# Patient Record
Sex: FEMALE | Race: WHITE | NOT HISPANIC OR LATINO | URBAN - METROPOLITAN AREA
[De-identification: names, ages, dates, MRNs, and addresses within clinical notes are randomized per-mention and may not be internally consistent; named-entity substitution may affect disease eponyms.]

---

## 2017-04-03 ENCOUNTER — EMERGENCY (EMERGENCY)
Facility: HOSPITAL | Age: 82
LOS: 1 days | Discharge: LEFT BEFORE DISCHARGE | End: 2017-04-03
Attending: EMERGENCY MEDICINE | Admitting: EMERGENCY MEDICINE
Payer: MEDICARE

## 2017-04-03 VITALS
OXYGEN SATURATION: 98 % | DIASTOLIC BLOOD PRESSURE: 58 MMHG | RESPIRATION RATE: 16 BRPM | TEMPERATURE: 98 F | SYSTOLIC BLOOD PRESSURE: 155 MMHG | HEART RATE: 62 BPM

## 2017-04-03 VITALS
OXYGEN SATURATION: 98 % | RESPIRATION RATE: 17 BRPM | SYSTOLIC BLOOD PRESSURE: 136 MMHG | TEMPERATURE: 98 F | HEART RATE: 73 BPM | DIASTOLIC BLOOD PRESSURE: 78 MMHG

## 2017-04-03 DIAGNOSIS — Z98.89 OTHER SPECIFIED POSTPROCEDURAL STATES: Chronic | ICD-10-CM

## 2017-04-03 DIAGNOSIS — Z96.60 PRESENCE OF UNSPECIFIED ORTHOPEDIC JOINT IMPLANT: Chronic | ICD-10-CM

## 2017-04-03 LAB
ALBUMIN SERPL ELPH-MCNC: 4.1 G/DL — SIGNIFICANT CHANGE UP (ref 3.3–5)
ALP SERPL-CCNC: 87 U/L — SIGNIFICANT CHANGE UP (ref 40–120)
ALT FLD-CCNC: 11 U/L RC — SIGNIFICANT CHANGE UP (ref 10–45)
ANION GAP SERPL CALC-SCNC: 15 MMOL/L — SIGNIFICANT CHANGE UP (ref 5–17)
APPEARANCE UR: CLEAR — SIGNIFICANT CHANGE UP
AST SERPL-CCNC: 11 U/L — SIGNIFICANT CHANGE UP (ref 10–40)
BASOPHILS # BLD AUTO: 0 K/UL — SIGNIFICANT CHANGE UP (ref 0–0.2)
BASOPHILS NFR BLD AUTO: 0.1 % — SIGNIFICANT CHANGE UP (ref 0–2)
BILIRUB SERPL-MCNC: 0.7 MG/DL — SIGNIFICANT CHANGE UP (ref 0.2–1.2)
BILIRUB UR-MCNC: NEGATIVE — SIGNIFICANT CHANGE UP
BUN SERPL-MCNC: 18 MG/DL — SIGNIFICANT CHANGE UP (ref 7–23)
CALCIUM SERPL-MCNC: 9.9 MG/DL — SIGNIFICANT CHANGE UP (ref 8.4–10.5)
CHLORIDE SERPL-SCNC: 104 MMOL/L — SIGNIFICANT CHANGE UP (ref 96–108)
CK MB BLD-MCNC: 1.5 % — SIGNIFICANT CHANGE UP (ref 0–3.5)
CK MB CFR SERPL CALC: 1.7 NG/ML — SIGNIFICANT CHANGE UP (ref 0–3.8)
CK SERPL-CCNC: 117 U/L — SIGNIFICANT CHANGE UP (ref 25–170)
CO2 SERPL-SCNC: 21 MMOL/L — LOW (ref 22–31)
COLOR SPEC: YELLOW — SIGNIFICANT CHANGE UP
CREAT SERPL-MCNC: 1.18 MG/DL — SIGNIFICANT CHANGE UP (ref 0.5–1.3)
DIFF PNL FLD: NEGATIVE — SIGNIFICANT CHANGE UP
EOSINOPHIL # BLD AUTO: 0 K/UL — SIGNIFICANT CHANGE UP (ref 0–0.5)
EOSINOPHIL NFR BLD AUTO: 0.1 % — SIGNIFICANT CHANGE UP (ref 0–6)
EPI CELLS # UR: SIGNIFICANT CHANGE UP /HPF
GLUCOSE SERPL-MCNC: 158 MG/DL — HIGH (ref 70–99)
GLUCOSE UR QL: NEGATIVE — SIGNIFICANT CHANGE UP
HCT VFR BLD CALC: 42 % — SIGNIFICANT CHANGE UP (ref 34.5–45)
HGB BLD-MCNC: 13.9 G/DL — SIGNIFICANT CHANGE UP (ref 11.5–15.5)
KETONES UR-MCNC: ABNORMAL
LEUKOCYTE ESTERASE UR-ACNC: NEGATIVE — SIGNIFICANT CHANGE UP
LYMPHOCYTES # BLD AUTO: 1.2 K/UL — SIGNIFICANT CHANGE UP (ref 1–3.3)
LYMPHOCYTES # BLD AUTO: 18.2 % — SIGNIFICANT CHANGE UP (ref 13–44)
MCHC RBC-ENTMCNC: 30.2 PG — SIGNIFICANT CHANGE UP (ref 27–34)
MCHC RBC-ENTMCNC: 33.2 GM/DL — SIGNIFICANT CHANGE UP (ref 32–36)
MCV RBC AUTO: 90.9 FL — SIGNIFICANT CHANGE UP (ref 80–100)
MONOCYTES # BLD AUTO: 0.6 K/UL — SIGNIFICANT CHANGE UP (ref 0–0.9)
MONOCYTES NFR BLD AUTO: 8.4 % — SIGNIFICANT CHANGE UP (ref 2–14)
NEUTROPHILS # BLD AUTO: 4.8 K/UL — SIGNIFICANT CHANGE UP (ref 1.8–7.4)
NEUTROPHILS NFR BLD AUTO: 73.2 % — SIGNIFICANT CHANGE UP (ref 43–77)
NITRITE UR-MCNC: NEGATIVE — SIGNIFICANT CHANGE UP
PH UR: 7.5 — SIGNIFICANT CHANGE UP (ref 4.8–8)
PLATELET # BLD AUTO: 168 K/UL — SIGNIFICANT CHANGE UP (ref 150–400)
POTASSIUM SERPL-MCNC: 3.9 MMOL/L — SIGNIFICANT CHANGE UP (ref 3.5–5.3)
POTASSIUM SERPL-SCNC: 3.9 MMOL/L — SIGNIFICANT CHANGE UP (ref 3.5–5.3)
PROT SERPL-MCNC: 7.3 G/DL — SIGNIFICANT CHANGE UP (ref 6–8.3)
PROT UR-MCNC: SIGNIFICANT CHANGE UP
RBC # BLD: 4.62 M/UL — SIGNIFICANT CHANGE UP (ref 3.8–5.2)
RBC # FLD: 13.3 % — SIGNIFICANT CHANGE UP (ref 10.3–14.5)
RBC CASTS # UR COMP ASSIST: SIGNIFICANT CHANGE UP /HPF (ref 0–2)
SODIUM SERPL-SCNC: 140 MMOL/L — SIGNIFICANT CHANGE UP (ref 135–145)
SP GR SPEC: 1.01 — SIGNIFICANT CHANGE UP (ref 1.01–1.02)
TROPONIN T SERPL-MCNC: <0.01 NG/ML — SIGNIFICANT CHANGE UP (ref 0–0.06)
UROBILINOGEN FLD QL: NEGATIVE — SIGNIFICANT CHANGE UP
WBC # BLD: 6.6 K/UL — SIGNIFICANT CHANGE UP (ref 3.8–10.5)
WBC # FLD AUTO: 6.6 K/UL — SIGNIFICANT CHANGE UP (ref 3.8–10.5)
WBC UR QL: SIGNIFICANT CHANGE UP /HPF (ref 0–5)

## 2017-04-03 PROCEDURE — 70450 CT HEAD/BRAIN W/O DYE: CPT

## 2017-04-03 PROCEDURE — 93010 ELECTROCARDIOGRAM REPORT: CPT

## 2017-04-03 PROCEDURE — 81001 URINALYSIS AUTO W/SCOPE: CPT

## 2017-04-03 PROCEDURE — 99284 EMERGENCY DEPT VISIT MOD MDM: CPT | Mod: 25

## 2017-04-03 PROCEDURE — 72170 X-RAY EXAM OF PELVIS: CPT

## 2017-04-03 PROCEDURE — 99285 EMERGENCY DEPT VISIT HI MDM: CPT | Mod: 25

## 2017-04-03 PROCEDURE — 73502 X-RAY EXAM HIP UNI 2-3 VIEWS: CPT | Mod: 26,RT

## 2017-04-03 PROCEDURE — 71010: CPT | Mod: 26

## 2017-04-03 PROCEDURE — 71045 X-RAY EXAM CHEST 1 VIEW: CPT

## 2017-04-03 PROCEDURE — 84484 ASSAY OF TROPONIN QUANT: CPT

## 2017-04-03 PROCEDURE — 70450 CT HEAD/BRAIN W/O DYE: CPT | Mod: 26

## 2017-04-03 PROCEDURE — 73562 X-RAY EXAM OF KNEE 3: CPT | Mod: 26,RT

## 2017-04-03 PROCEDURE — 93005 ELECTROCARDIOGRAM TRACING: CPT

## 2017-04-03 PROCEDURE — 73562 X-RAY EXAM OF KNEE 3: CPT

## 2017-04-03 PROCEDURE — 82550 ASSAY OF CK (CPK): CPT

## 2017-04-03 PROCEDURE — 73502 X-RAY EXAM HIP UNI 2-3 VIEWS: CPT

## 2017-04-03 PROCEDURE — 85027 COMPLETE CBC AUTOMATED: CPT

## 2017-04-03 PROCEDURE — 80053 COMPREHEN METABOLIC PANEL: CPT

## 2017-04-03 PROCEDURE — 87086 URINE CULTURE/COLONY COUNT: CPT

## 2017-04-03 PROCEDURE — 82553 CREATINE MB FRACTION: CPT

## 2017-04-03 RX ORDER — SODIUM CHLORIDE 9 MG/ML
1000 INJECTION INTRAMUSCULAR; INTRAVENOUS; SUBCUTANEOUS ONCE
Qty: 0 | Refills: 0 | Status: COMPLETED | OUTPATIENT
Start: 2017-04-03 | End: 2017-04-03

## 2017-04-03 RX ORDER — ACETAMINOPHEN 500 MG
650 TABLET ORAL ONCE
Qty: 0 | Refills: 0 | Status: COMPLETED | OUTPATIENT
Start: 2017-04-03 | End: 2017-04-03

## 2017-04-03 RX ADMIN — Medication 650 MILLIGRAM(S): at 12:07

## 2017-04-03 RX ADMIN — SODIUM CHLORIDE 1000 MILLILITER(S): 9 INJECTION INTRAMUSCULAR; INTRAVENOUS; SUBCUTANEOUS at 11:19

## 2017-04-03 RX ADMIN — Medication 650 MILLIGRAM(S): at 11:19

## 2017-04-03 RX ADMIN — SODIUM CHLORIDE 2000 MILLILITER(S): 9 INJECTION INTRAMUSCULAR; INTRAVENOUS; SUBCUTANEOUS at 15:09

## 2017-04-03 NOTE — ED PROVIDER NOTE - FAMILY HISTORY
Mother  Still living? No  Family history of dementia, Age at diagnosis: Age Unknown     Father  Still living? No  Family history of secondary cancer of bone and bone marrow, Age at diagnosis: Age Unknown     Sibling  Still living? Yes, Estimated age: Age Unknown  Family history of lung cancer, Age at diagnosis: Age Unknown

## 2017-04-03 NOTE — ED PROVIDER NOTE - PHYSICAL EXAMINATION
Gen: NAD, AOx3  Head: NCAT  HEENT: PERRL, oral mucosa moist, normal conjunctiva  Lung: CTAB, no respiratory distress  CV: rrr, no murmurs, Normal perfusion  Abd: soft, NTND, no CVA tenderness  MSK: No edema, no visible deformities, R knee no bruising minimal swelling tenderness distal to patella, R hip with decreased ROM due to pain no bruising no external rotation or shortening, pelvis stable nontender; Entire spine without midline tenderness and with full ROM, no paravertebral tenderness, no stepoffs or masses   Neuro: No focal neurologic deficits, CN intact, motor and sensation intact, no cerebellar signs   Skin: No rash   Psych: normal affect

## 2017-04-03 NOTE — ED ADULT NURSE NOTE - OBJECTIVE STATEMENT
80 yo female with PMH L lung ca s/p lobectomy, DM, a fib, s/p colectomy brought by EMS s/p episode of altered mental status today. Patient was in shower, daughter was cleaning room outside of bathroom. After shower patient sat on toilet because she was feeling weak, daughter states that patient became "unresponsive" for 2 minutes. (Patient had her eyes open and did not appear to lose consciousness, but was not responding to questions or speaking). Daughter also states that yesterday she found patient kneeling on bathroom floor, patient had defecated on floor which was unusual for her. patient denies falling but does reporting mild right knee pain. At present, patient reports dizziness. She is A&Ox3. Respirations unlabored. Abdomen is soft, nondistended. Moving all extremities. Pt states pain in right knee from fall, symmetry noted bilaterally, +pp bilaterally, +sensation bilaterally. Pt denies N/V, pt denies numbness and tingling, no fever, no cough, no chest pain, no SOB. IV in left forearm 20G and patent, VSS, pt currently resting in bed, will continue to monitor

## 2017-04-03 NOTE — ED PROVIDER NOTE - PROGRESS NOTE DETAILS
Discussed results of tests and xrays with patient, recommend admission for further workup, however patient would like to go home anyway. The patient is leaving against my medical advice.  I have informed the patient about the risks, benefits, and alternatives to the evaluation and treatment of their condition.  The patient reports understanding of these issues and has the capacity and insight to make important medical decisions. The necessity to follow up with PMD/Clinic/follow up provided within 2-3 days was explained. The patient understands that this decision to go against medical advice can be changed at any time and the patient can return for re-evaluation and further treatment with any changes in condition or concerns. The patient understands all the reasons to return to the Emergency Department, including any concerns at all, the patient reports understanding of above with capacity and insight.

## 2017-04-03 NOTE — ED PROVIDER NOTE - PSH
S/P colectomy    S/P lobectomy of lung  s/p LLL Lobectomy 6/2014 for lung CA  Status post left hip replacement

## 2017-04-03 NOTE — ED PROVIDER NOTE - MEDICAL DECISION MAKING DETAILS
82 yo female with multiple episode of dizziness/lightheadedness including julia syncope x 2 minutes this AM in the setting of intermittent diarrhea.  No fevers, no abdominal pain, no vomiting.  Suspect volume depletion; doubt c.diff.  Benign abdomen.  Will check labs, IVF, likely admit for further monitoring/IVF/tele.

## 2017-04-03 NOTE — ED PROVIDER NOTE - OBJECTIVE STATEMENT
Patient is 81 y F with PMH L lung ca s/p lobectomy, DM, a fib, s/p colectomy. Waqs showering this AM and felt lightheaded, and sat down on toilet and became lethargic and couldn't get up, daughter called EMS. No fall, no head trauma. Has had decreased PO intake. Currently feels lightheaded, without pain. Fell yesterday without head trauma or LOC.    PMD:  ROS: Denies fever, palpitations, chills, recent sickness, HA, vision changes, cough, SOB, chest pain, abdominal pain, n/v/d/c, dysuria, hematuria, rash, new joint aches, sick contacts, and recent travel. Patient is 81 y F with PMH L lung ca s/p lobectomy, DM, a fib, s/p colectomy. Was showering this AM and felt lightheaded, and sat down on toilet and became lethargic and couldn't get up, daughter called EMS. No fall, no head trauma. Has had decreased PO intake. Currently feels lightheaded, without pain. Fell yesterday without head trauma or LOC after feeling weak.    ROS: Denies fever, palpitations, chills, recent sickness, HA, vision changes, cough, SOB, chest pain, abdominal pain, n/v/d/c, dysuria, hematuria, rash, new joint aches, sick contacts, and recent travel. Patient is 81 y F with PMH L lung ca s/p L lobectomy, DM, a fib, s/p colectomy. Was showering this AM and felt lightheaded, and sat down on toilet and became lethargic and couldn't get up, daughter called EMS. No fall, no head trauma. Has had decreased PO intake past few days. Currently feels lightheaded, without pain. Fell yesterday without head trauma or LOC after feeling weak.    ROS: Denies fever, palpitations, chills, recent sickness, HA, vision changes, cough, SOB, chest pain, abdominal pain, n/v/d/c, dysuria, hematuria, rash, new joint aches, sick contacts, and recent travel.

## 2017-04-04 LAB
CULTURE RESULTS: SIGNIFICANT CHANGE UP
SPECIMEN SOURCE: SIGNIFICANT CHANGE UP

## 2017-04-07 DIAGNOSIS — R53.83 OTHER FATIGUE: ICD-10-CM

## 2017-04-07 DIAGNOSIS — Z79.01 LONG TERM (CURRENT) USE OF ANTICOAGULANTS: ICD-10-CM

## 2017-04-07 DIAGNOSIS — R19.7 DIARRHEA, UNSPECIFIED: ICD-10-CM

## 2017-04-07 DIAGNOSIS — I48.91 UNSPECIFIED ATRIAL FIBRILLATION: ICD-10-CM

## 2017-04-07 DIAGNOSIS — E11.9 TYPE 2 DIABETES MELLITUS WITHOUT COMPLICATIONS: ICD-10-CM

## 2017-04-07 DIAGNOSIS — R42 DIZZINESS AND GIDDINESS: ICD-10-CM

## 2017-06-01 ENCOUNTER — APPOINTMENT (OUTPATIENT)
Dept: HEART AND VASCULAR | Facility: CLINIC | Age: 82
End: 2017-06-01

## 2017-06-02 ENCOUNTER — APPOINTMENT (OUTPATIENT)
Dept: CARDIOLOGY | Facility: CLINIC | Age: 82
End: 2017-06-02

## 2017-06-02 VITALS
SYSTOLIC BLOOD PRESSURE: 137 MMHG | HEIGHT: 64 IN | DIASTOLIC BLOOD PRESSURE: 72 MMHG | HEART RATE: 68 BPM | OXYGEN SATURATION: 95 %

## 2018-01-19 ENCOUNTER — INPATIENT (INPATIENT)
Facility: HOSPITAL | Age: 83
LOS: 5 days | Discharge: HOME | DRG: 300 | End: 2018-01-25
Attending: HOSPITALIST | Admitting: HOSPITALIST
Payer: MEDICARE

## 2018-01-19 ENCOUNTER — APPOINTMENT (OUTPATIENT)
Dept: CARDIOLOGY | Facility: CLINIC | Age: 83
End: 2018-01-19
Payer: MEDICARE

## 2018-01-19 ENCOUNTER — NON-APPOINTMENT (OUTPATIENT)
Age: 83
End: 2018-01-19

## 2018-01-19 ENCOUNTER — OUTPATIENT (OUTPATIENT)
Dept: OUTPATIENT SERVICES | Facility: HOSPITAL | Age: 83
LOS: 1 days | End: 2018-01-19
Payer: MEDICARE

## 2018-01-19 VITALS
DIASTOLIC BLOOD PRESSURE: 73 MMHG | BODY MASS INDEX: 28.51 KG/M2 | WEIGHT: 167 LBS | OXYGEN SATURATION: 92 % | HEART RATE: 75 BPM | SYSTOLIC BLOOD PRESSURE: 135 MMHG | HEIGHT: 64 IN

## 2018-01-19 VITALS
TEMPERATURE: 98 F | WEIGHT: 166.89 LBS | OXYGEN SATURATION: 97 % | SYSTOLIC BLOOD PRESSURE: 152 MMHG | DIASTOLIC BLOOD PRESSURE: 66 MMHG | HEART RATE: 65 BPM | RESPIRATION RATE: 20 BRPM

## 2018-01-19 DIAGNOSIS — Z96.60 PRESENCE OF UNSPECIFIED ORTHOPEDIC JOINT IMPLANT: Chronic | ICD-10-CM

## 2018-01-19 DIAGNOSIS — Z98.89 OTHER SPECIFIED POSTPROCEDURAL STATES: Chronic | ICD-10-CM

## 2018-01-19 DIAGNOSIS — C34.90 MALIGNANT NEOPLASM OF UNSPECIFIED PART OF UNSPECIFIED BRONCHUS OR LUNG: ICD-10-CM

## 2018-01-19 DIAGNOSIS — E11.9 TYPE 2 DIABETES MELLITUS WITHOUT COMPLICATIONS: ICD-10-CM

## 2018-01-19 DIAGNOSIS — Z29.9 ENCOUNTER FOR PROPHYLACTIC MEASURES, UNSPECIFIED: ICD-10-CM

## 2018-01-19 DIAGNOSIS — I82.409 ACUTE EMBOLISM AND THROMBOSIS OF UNSPECIFIED DEEP VEINS OF UNSPECIFIED LOWER EXTREMITY: ICD-10-CM

## 2018-01-19 DIAGNOSIS — I82.412 ACUTE EMBOLISM AND THROMBOSIS OF LEFT FEMORAL VEIN: ICD-10-CM

## 2018-01-19 LAB
ALBUMIN SERPL ELPH-MCNC: 4.4 G/DL — SIGNIFICANT CHANGE UP (ref 3.3–5)
ALP SERPL-CCNC: 77 U/L — SIGNIFICANT CHANGE UP (ref 40–120)
ALT FLD-CCNC: 10 U/L RC — SIGNIFICANT CHANGE UP (ref 10–45)
ANION GAP SERPL CALC-SCNC: 13 MMOL/L — SIGNIFICANT CHANGE UP (ref 5–17)
APTT BLD: 35.2 SEC — SIGNIFICANT CHANGE UP (ref 27.5–37.4)
AST SERPL-CCNC: 14 U/L — SIGNIFICANT CHANGE UP (ref 10–40)
BASE EXCESS BLDV CALC-SCNC: 0.8 MMOL/L — SIGNIFICANT CHANGE UP (ref -2–2)
BASOPHILS # BLD AUTO: 0 K/UL — SIGNIFICANT CHANGE UP (ref 0–0.2)
BASOPHILS NFR BLD AUTO: 0.6 % — SIGNIFICANT CHANGE UP (ref 0–2)
BILIRUB SERPL-MCNC: 0.4 MG/DL — SIGNIFICANT CHANGE UP (ref 0.2–1.2)
BUN SERPL-MCNC: 27 MG/DL — HIGH (ref 7–23)
CA-I SERPL-SCNC: 1.35 MMOL/L — HIGH (ref 1.12–1.3)
CALCIUM SERPL-MCNC: 10.2 MG/DL — SIGNIFICANT CHANGE UP (ref 8.4–10.5)
CHLORIDE BLDV-SCNC: 108 MMOL/L — SIGNIFICANT CHANGE UP (ref 96–108)
CHLORIDE SERPL-SCNC: 108 MMOL/L — SIGNIFICANT CHANGE UP (ref 96–108)
CO2 BLDV-SCNC: 30 MMOL/L — SIGNIFICANT CHANGE UP (ref 22–30)
CO2 SERPL-SCNC: 26 MMOL/L — SIGNIFICANT CHANGE UP (ref 22–31)
CREAT SERPL-MCNC: 1.08 MG/DL — SIGNIFICANT CHANGE UP (ref 0.5–1.3)
EOSINOPHIL # BLD AUTO: 0 K/UL — SIGNIFICANT CHANGE UP (ref 0–0.5)
EOSINOPHIL NFR BLD AUTO: 0.1 % — SIGNIFICANT CHANGE UP (ref 0–6)
GAS PNL BLDV: 146 MMOL/L — HIGH (ref 136–145)
GAS PNL BLDV: SIGNIFICANT CHANGE UP
GAS PNL BLDV: SIGNIFICANT CHANGE UP
GLUCOSE BLDV-MCNC: 98 MG/DL — SIGNIFICANT CHANGE UP (ref 70–99)
GLUCOSE SERPL-MCNC: 97 MG/DL — SIGNIFICANT CHANGE UP (ref 70–99)
HCO3 BLDV-SCNC: 28 MMOL/L — SIGNIFICANT CHANGE UP (ref 21–29)
HCT VFR BLD CALC: 42.5 % — SIGNIFICANT CHANGE UP (ref 34.5–45)
HCT VFR BLDA CALC: 48 % — SIGNIFICANT CHANGE UP (ref 39–50)
HGB BLD CALC-MCNC: 15.5 G/DL — SIGNIFICANT CHANGE UP (ref 11.5–15.5)
HGB BLD-MCNC: 15.1 G/DL — SIGNIFICANT CHANGE UP (ref 11.5–15.5)
INR BLD: 1.01 RATIO — SIGNIFICANT CHANGE UP (ref 0.88–1.16)
LACTATE BLDV-MCNC: 2.2 MMOL/L — HIGH (ref 0.7–2)
LYMPHOCYTES # BLD AUTO: 2.4 K/UL — SIGNIFICANT CHANGE UP (ref 1–3.3)
LYMPHOCYTES # BLD AUTO: 30.1 % — SIGNIFICANT CHANGE UP (ref 13–44)
MCHC RBC-ENTMCNC: 32.3 PG — SIGNIFICANT CHANGE UP (ref 27–34)
MCHC RBC-ENTMCNC: 35.4 GM/DL — SIGNIFICANT CHANGE UP (ref 32–36)
MCV RBC AUTO: 91.1 FL — SIGNIFICANT CHANGE UP (ref 80–100)
MONOCYTES # BLD AUTO: 0.6 K/UL — SIGNIFICANT CHANGE UP (ref 0–0.9)
MONOCYTES NFR BLD AUTO: 7.7 % — SIGNIFICANT CHANGE UP (ref 2–14)
NEUTROPHILS # BLD AUTO: 4.8 K/UL — SIGNIFICANT CHANGE UP (ref 1.8–7.4)
NEUTROPHILS NFR BLD AUTO: 61.5 % — SIGNIFICANT CHANGE UP (ref 43–77)
PCO2 BLDV: 58 MMHG — HIGH (ref 35–50)
PH BLDV: 7.31 — LOW (ref 7.35–7.45)
PLATELET # BLD AUTO: 195 K/UL — SIGNIFICANT CHANGE UP (ref 150–400)
PO2 BLDV: 35 MMHG — SIGNIFICANT CHANGE UP (ref 25–45)
POTASSIUM BLDV-SCNC: 4 MMOL/L — SIGNIFICANT CHANGE UP (ref 3.5–5)
POTASSIUM SERPL-MCNC: 4.6 MMOL/L — SIGNIFICANT CHANGE UP (ref 3.5–5.3)
POTASSIUM SERPL-SCNC: 4.6 MMOL/L — SIGNIFICANT CHANGE UP (ref 3.5–5.3)
PROT SERPL-MCNC: 8.4 G/DL — HIGH (ref 6–8.3)
PROTHROM AB SERPL-ACNC: 10.9 SEC — SIGNIFICANT CHANGE UP (ref 9.8–12.7)
RBC # BLD: 4.67 M/UL — SIGNIFICANT CHANGE UP (ref 3.8–5.2)
RBC # FLD: 13.2 % — SIGNIFICANT CHANGE UP (ref 10.3–14.5)
SAO2 % BLDV: 54 % — LOW (ref 67–88)
SODIUM SERPL-SCNC: 147 MMOL/L — HIGH (ref 135–145)
WBC # BLD: 7.9 K/UL — SIGNIFICANT CHANGE UP (ref 3.8–10.5)
WBC # FLD AUTO: 7.9 K/UL — SIGNIFICANT CHANGE UP (ref 3.8–10.5)

## 2018-01-19 PROCEDURE — 93010 ELECTROCARDIOGRAM REPORT: CPT

## 2018-01-19 PROCEDURE — 71045 X-RAY EXAM CHEST 1 VIEW: CPT | Mod: 26

## 2018-01-19 PROCEDURE — 99223 1ST HOSP IP/OBS HIGH 75: CPT

## 2018-01-19 PROCEDURE — 99285 EMERGENCY DEPT VISIT HI MDM: CPT | Mod: 25,GC

## 2018-01-19 PROCEDURE — 99214 OFFICE O/P EST MOD 30 MIN: CPT | Mod: PD

## 2018-01-19 PROCEDURE — 93971 EXTREMITY STUDY: CPT | Mod: 26

## 2018-01-19 RX ORDER — ENOXAPARIN SODIUM 100 MG/ML
80 INJECTION SUBCUTANEOUS ONCE
Qty: 0 | Refills: 0 | Status: COMPLETED | OUTPATIENT
Start: 2018-01-19 | End: 2018-01-19

## 2018-01-19 RX ORDER — INSULIN LISPRO 100/ML
VIAL (ML) SUBCUTANEOUS AT BEDTIME
Qty: 0 | Refills: 0 | Status: DISCONTINUED | OUTPATIENT
Start: 2018-01-19 | End: 2018-01-25

## 2018-01-19 RX ORDER — DEXTROSE 50 % IN WATER 50 %
1 SYRINGE (ML) INTRAVENOUS ONCE
Qty: 0 | Refills: 0 | Status: DISCONTINUED | OUTPATIENT
Start: 2018-01-19 | End: 2018-01-25

## 2018-01-19 RX ORDER — DEXTROSE 50 % IN WATER 50 %
12.5 SYRINGE (ML) INTRAVENOUS ONCE
Qty: 0 | Refills: 0 | Status: DISCONTINUED | OUTPATIENT
Start: 2018-01-19 | End: 2018-01-25

## 2018-01-19 RX ORDER — DEXTROSE 50 % IN WATER 50 %
25 SYRINGE (ML) INTRAVENOUS ONCE
Qty: 0 | Refills: 0 | Status: DISCONTINUED | OUTPATIENT
Start: 2018-01-19 | End: 2018-01-25

## 2018-01-19 RX ORDER — INSULIN LISPRO 100/ML
VIAL (ML) SUBCUTANEOUS
Qty: 0 | Refills: 0 | Status: DISCONTINUED | OUTPATIENT
Start: 2018-01-19 | End: 2018-01-25

## 2018-01-19 RX ORDER — SODIUM CHLORIDE 9 MG/ML
1000 INJECTION, SOLUTION INTRAVENOUS
Qty: 0 | Refills: 0 | Status: DISCONTINUED | OUTPATIENT
Start: 2018-01-19 | End: 2018-01-25

## 2018-01-19 RX ORDER — WARFARIN SODIUM 2.5 MG/1
5 TABLET ORAL ONCE
Qty: 0 | Refills: 0 | Status: COMPLETED | OUTPATIENT
Start: 2018-01-19 | End: 2018-01-19

## 2018-01-19 RX ORDER — CHOLECALCIFEROL (VITAMIN D3) 125 MCG
0 CAPSULE ORAL
Qty: 0 | Refills: 0 | COMMUNITY

## 2018-01-19 RX ORDER — GLUCAGON INJECTION, SOLUTION 0.5 MG/.1ML
1 INJECTION, SOLUTION SUBCUTANEOUS ONCE
Qty: 0 | Refills: 0 | Status: DISCONTINUED | OUTPATIENT
Start: 2018-01-19 | End: 2018-01-25

## 2018-01-19 RX ORDER — UMECLIDINIUM BROMIDE AND VILANTEROL TRIFENATATE 62.5; 25 UG/1; UG/1
0 POWDER RESPIRATORY (INHALATION)
Qty: 0 | Refills: 0 | COMMUNITY

## 2018-01-19 RX ORDER — ENOXAPARIN SODIUM 100 MG/ML
80 INJECTION SUBCUTANEOUS
Qty: 0 | Refills: 0 | Status: DISCONTINUED | OUTPATIENT
Start: 2018-01-19 | End: 2018-01-25

## 2018-01-19 RX ORDER — CHOLECALCIFEROL (VITAMIN D3) 125 MCG
1000 CAPSULE ORAL DAILY
Qty: 0 | Refills: 0 | Status: DISCONTINUED | OUTPATIENT
Start: 2018-01-19 | End: 2018-01-25

## 2018-01-19 RX ORDER — ASPIRIN/CALCIUM CARB/MAGNESIUM 324 MG
81 TABLET ORAL DAILY
Qty: 0 | Refills: 0 | Status: DISCONTINUED | OUTPATIENT
Start: 2018-01-19 | End: 2018-01-25

## 2018-01-19 RX ORDER — ASPIRIN/CALCIUM CARB/MAGNESIUM 324 MG
0 TABLET ORAL
Qty: 0 | Refills: 0 | COMMUNITY

## 2018-01-19 RX ADMIN — WARFARIN SODIUM 5 MILLIGRAM(S): 2.5 TABLET ORAL at 23:07

## 2018-01-19 RX ADMIN — ENOXAPARIN SODIUM 80 MILLIGRAM(S): 100 INJECTION SUBCUTANEOUS at 20:12

## 2018-01-19 NOTE — H&P ADULT - NSHPPHYSICALEXAM_GEN_ALL_CORE
PHYSICAL EXAM:  GENERAL: NAD, well-groomed, well-developed  HEAD:  Atraumatic, Normocephalic  EYES: EOMI, PERRLA, conjunctiva and sclera clear  ENMT: No tonsillar erythema, exudates, or enlargement; Moist mucous membranes, Good dentition, No lesions  NECK: Supple, No JVD, Normal thyroid  CHEST/LUNG: Clear to percussion bilaterally; No rales, rhonchi, wheezing, or rubs  HEART: Regular rate and rhythm; No murmurs, rubs, or gallops.  3+ LLE edema >RLE  ABDOMEN: Soft, Nontender, Nondistended; Bowel sounds present  EXTREMITIES:  2+ Peripheral Pulses, No clubbing, cyanosis, edema as noted above  LYMPH: No lymphadenopathy noted  SKIN: No rashes or lesions  NERVOUS SYSTEM:  Alert & Oriented X3, Good concentration; Motor Strength 5/5 B/L upper and lower extremities but with some decreased rom of LLE at hip

## 2018-01-19 NOTE — H&P ADULT - PROBLEM SELECTOR PLAN 2
-it appears pt had a strong effort at curbing polypharmacy by her pcp; she is no longer on any diabetic medications  -Start HISS, check fsg qac/qhs  -check a1c in am  -consistent carb diet

## 2018-01-19 NOTE — ED PROVIDER NOTE - ATTENDING CONTRIBUTION TO CARE
****ATTENDING**** 83yo f hx listed BIB daughter for LLE DVT. Patient had swelling x 1 week, had an outpt sono that showed a DVT. Pt denies chest pain, palp, sob. Has had DVT in past s/p lung ca. Denies any recent injury. Travelled to [place] x 2. No fever or chills.   On exam, Patient is awake,alert,oriented x 3. Patient is well appearing and in no acute distress. Patient's chest is clear to ausculation, +s1s2. Abdomen is soft nd/nt +BS. LLE + swelling 2-3 + + sensation, cap refill < 2 secs and + PT/DP. + mild erythema anterior lower leg. RLE + DP/PT. + sensation.   Pt VS stable. Check Labs, start anticoagulation and admit patient to hospital.

## 2018-01-19 NOTE — ED PROVIDER NOTE - NS ED ROS FT
Constitutional: no fevers, chills  HEENT: no visual changes, no sore throat, no rhinorrhea  CV: no cp  Resp: no sob  GI: no abd pain, n/v, diarrhea/constipation  : no dysuria, hematuria  MSK: +LLE swelling, pain  skin: no rashes  neuro: no HA, no confusion  psych: no SI/HI

## 2018-01-19 NOTE — H&P ADULT - FAMILY HISTORY
Family history of dementia     Family history of secondary cancer of bone and bone marrow     Sibling  Still living? Yes, Estimated age: Age Unknown  Family history of lung cancer, Age at diagnosis: Age Unknown

## 2018-01-19 NOTE — H&P ADULT - HISTORY OF PRESENT ILLNESS
82 F PMH lung cancer s/p R lobectomy, malignant melanoma, breast CA s/p lumpectomy (cancer free since 2015), DM, R DVT previously on coumadin p/w LLE swelling.     VS: 97.8, 59, 132/84, 15, 95% RA.  Labs: cbc nondx, coags wnl, mild hypernatremia, cmp c/w stable ckd3.  CXR prelim lungs grossly clear, limited study.  LLE Doppler showed thrombus in L common fem, fem, and pop veins including tib peroneal trunk.  Pt received first dose lovenox 80 x 1 in ER. 82 F PMH Afib, lung cancer s/p R lobectomy, malignant melanoma, breast CA s/p lumpectomy (cancer free since 2015), DM, R DVT previously on coumadin p/w LLE swelling.     VS: 97.8, 59, 132/84, 15, 95% RA.  Labs: cbc nondx, coags wnl, mild hypernatremia, cmp c/w stable ckd3.  CXR prelim lungs grossly clear, limited study.  LLE Doppler showed thrombus in L common fem, fem, and pop veins including tib peroneal trunk.  Pt received first dose lovenox 80 x 1 in ER. 82 F PMH Afib, lung cancer s/p R lobectomy, malignant melanoma, breast CA s/p lumpectomy (cancer free since 2015), DM, R DVT previously on coumadin p/w LLE swelling x 3 weeks.  Pt was seeing her o/p vascular doctor, who referred her for o/p doppler study that showed significant L sided DVT. Currently pt c/o ongoing L sided leg swelling. +shooting pains from ankle up to thigh. Pt notes she has been more immobile over last few weeks 2/2 weather, usually is ambulatory occasionally with cane but not as much recently. +recent plane travel to [place] twice to visit ailing brother. Pt had prior DVT in setting of diagnosed lung malignancy in 2014; was on coumadin for ~1 yr and then off; has been off for about 2+ years prior to formation of current DVT. Denies CP/sob/f. Denies n/v/d.     VS: 97.8, 59, 132/84, 15, 95% RA.  Labs: cbc nondx, coags wnl, mild hypernatremia, cmp c/w stable ckd3.  CXR prelim lungs grossly clear, limited study.  LLE Doppler showed thrombus in L common fem, fem, and pop veins including tib peroneal trunk.  Pt received first dose lovenox 80 x 1 in ER.

## 2018-01-19 NOTE — ED PROVIDER NOTE - OBJECTIVE STATEMENT
83yo F h/o lung cancer s/p R lobectomy, malignant melanoma, breast CA s/p lumpectomy (cancer free since 2015), DM, R DVT previously on coumadin sent in by Dr. Shanks (cards) for LLE DVT confirmed on US. US study showing DVT from L common fem to popliteal. Denies cp, sob, cough, f/c, leg weakness. Pt in initially noted LLE swelling, erythema and pain 3d ago. Saw PCP who sent her to vascular. Seen by Dr. Preciado where DVT was diagnosed. Pt able to ambulate but spends majority of time sitting. Was previously on coumadin for DVT in her RLE. Denies recent long travels, surgeries. 83yo F h/o lung cancer s/p R lobectomy, malignant melanoma, breast CA s/p lumpectomy (cancer free since 2015), DM, R DVT previously on coumadin sent in by Dr. Shanks (cards) for LLE DVT confirmed on US. US study showing DVT from L common fem to popliteal. Denies cp, sob, cough, f/c, leg weakness. Pt in initially noted LLE swelling, erythema and pain 3d ago. Saw PCP who sent her to vascular. Seen by Dr. Shanks where DVT was diagnosed. Pt able to ambulate but spends majority of time sitting. Was previously on coumadin for DVT in her RLE. Denies recent long travels, surgeries, hematuria, hematochezia, melena.

## 2018-01-19 NOTE — H&P ADULT - ASSESSMENT
82 F PMH Afib, lung cancer s/p R lobectomy, malignant melanoma, breast CA s/p lumpectomy (cancer free since 2015), DM, R DVT previously on coumadin p/w LLE swelling.

## 2018-01-19 NOTE — ED PROVIDER NOTE - PHYSICAL EXAMINATION
Vitals: WNL  Gen: laying comfortably in NAD  Head: NCAT  ENT: sclerae white, anicterus, moist mucous membranes. No exudates. Neck supple, no LAD,  no carotid bruits, no JVD  CV: RRR. Audible S1 and S2. No murmurs, rubs, gallops, S3, nor S4, 2+ radial and DP pulses   Pulm: Clear to auscultation bilaterally. No wheezes, rales, or rhonchi  Abd: soft, normoactive BS x4, NTND, no rebound, no guarding, no rashes  Musculoskeletal:  LLE: 2+ pedal pulses b/l  Skin: no lesions or scars noted  Neurologic: AAOx3  : no CVA tenderness Vitals: WNL  Gen: laying comfortably in NAD  Head: NCAT  ENT: sclerae white, anicterus, moist mucous membranes. No exudates. Neck supple  CV: RRR. Audible S1 and S2. No murmurs, rubs, gallops, S3, nor S4, 2+ radial and DP pulses   Pulm: Clear to auscultation bilaterally. No wheezes, rales, or rhonchi  Abd: soft, normoactive BS x4, NTND, no rebound, no guarding, no rashes  Musculoskeletal:  LLE: 2+ pedal pulses b/l, swelling of entire LLE compared to RLE, calf ttp, mild erythema around ankle, sensation intact, motor intact, RLE: 2+ pitting edema of foot, sensation intact, motor intact  Skin: as noted in MSK  Neurologic: AAOx3  : no CVA tenderness

## 2018-01-19 NOTE — H&P ADULT - NSHPREVIEWOFSYSTEMS_GEN_ALL_CORE
REVIEW OF SYSTEMS:  CONSTITUTIONAL: No weakness. No fevers. No chills. No weight loss. Good appetite.  EYES: No visual changes. No eye pain.  ENT: No hearing difficulty. No vertigo. No dysphagia. No Sinusitis/rhinorrhea.  NECK: No pain. No stiffness/rigidity.  CARDIAC: No chest pain. No palpitations.  RESPIRATORY: No cough. No SOB. No hemoptysis.  GASTROINTESTINAL: No abdominal pain. No nausea. No vomiting. No hematemesis. No diarrhea. No constipation. No melena. No hematochezia.  GENITOURINARY: No dysuria. No frequency. No hesitancy. No hematuria.  NEUROLOGICAL: No numbness. No focal weakness. No incontinence. No headache.  BACK: No back pain.  EXTREMITIES: +lower extremity edema. dec ROM.  SKIN: No rashes. No itching. No other lesions.  PSYCHIATRIC: No depression. No anxiety. No SI/HI.  ALLERGIC: No lip swelling. No hives.  All other review of systems is negative unless indicated above.

## 2018-01-19 NOTE — H&P ADULT - PROBLEM SELECTOR PLAN 3
-s/p lobectomy, no recurrence  -pt has had "breathing issues" since diagnosis; ?copd given longstanding smoking hx.   -pt is in anoro Elipta; instructed family to bring in and have pharmacy verify before next dose tomorrow

## 2018-01-19 NOTE — ED PROVIDER NOTE - MEDICAL DECISION MAKING DETAILS
83yo F h/o lung cancer s/p R lobectomy, malignant melanoma, breast CA s/p lumpectomy (cancer free since 2015), DM, R DVT previously on coumadin sent in by Dr. Shanks (cards) for LLE DVT confirmed on US. Pt with extensive L DVT, although pt with 2+ DP pulses, sensation/motor intact - no concern and has no signs/symptoms concerning for PE. Will eval with labs, cxr, ua. Will start anticoagulation. Will need admission.

## 2018-01-19 NOTE — H&P ADULT - PROBLEM SELECTOR PLAN 1
-confirmed on LE dopplers  -s/p Lovenox 80 x 1 in ER  -Had long conversation with patient and daughter at bedside.  Patient is very against any form of anticoagulation other than coumadin.  Refusing long term lovenox or xarelto/doacs. I explained to patient and family that coumadin can still be an option as this recurrent DVT does NOT qualify as coumadin failure; however, in order to safely re-initiate coumadin, she will need therapeutic overlap with lovenox and require inpatient admission for a few days for this.  She is amenable  -c/w 80 iv lovenox bid  -dose coumadin 5 mg x 1 tonight, check INR in am

## 2018-01-19 NOTE — H&P ADULT - NSHPSOCIALHISTORY_GEN_ALL_CORE
Social History:      Occupation: retired employee of [employer]  Lives with: ( x ) alone  (  ) children   (  ) spouse   (  ) parents  (  ) other    Substance Use (street drugs): (x  ) never used  (  ) other:  Tobacco Usage:  (   ) never smoked   (  x ) former smoker   (   ) current smoker  (     ) pack year  (        ) last cigarette date

## 2018-01-20 LAB
ALBUMIN SERPL ELPH-MCNC: 3.7 G/DL — SIGNIFICANT CHANGE UP (ref 3.3–5)
ALP SERPL-CCNC: 64 U/L — SIGNIFICANT CHANGE UP (ref 40–120)
ALT FLD-CCNC: 9 U/L RC — LOW (ref 10–45)
ANION GAP SERPL CALC-SCNC: 12 MMOL/L — SIGNIFICANT CHANGE UP (ref 5–17)
APTT BLD: 48.7 SEC — HIGH (ref 27.5–37.4)
AST SERPL-CCNC: 11 U/L — SIGNIFICANT CHANGE UP (ref 10–40)
BASOPHILS # BLD AUTO: 0.1 K/UL — SIGNIFICANT CHANGE UP (ref 0–0.2)
BASOPHILS NFR BLD AUTO: 2.2 % — HIGH (ref 0–2)
BILIRUB SERPL-MCNC: 0.5 MG/DL — SIGNIFICANT CHANGE UP (ref 0.2–1.2)
BUN SERPL-MCNC: 23 MG/DL — SIGNIFICANT CHANGE UP (ref 7–23)
CALCIUM SERPL-MCNC: 9.2 MG/DL — SIGNIFICANT CHANGE UP (ref 8.4–10.5)
CHLORIDE SERPL-SCNC: 108 MMOL/L — SIGNIFICANT CHANGE UP (ref 96–108)
CO2 SERPL-SCNC: 23 MMOL/L — SIGNIFICANT CHANGE UP (ref 22–31)
CREAT SERPL-MCNC: 1 MG/DL — SIGNIFICANT CHANGE UP (ref 0.5–1.3)
EOSINOPHIL # BLD AUTO: 0 K/UL — SIGNIFICANT CHANGE UP (ref 0–0.5)
EOSINOPHIL NFR BLD AUTO: 0.1 % — SIGNIFICANT CHANGE UP (ref 0–6)
GLUCOSE SERPL-MCNC: 115 MG/DL — HIGH (ref 70–99)
HBA1C BLD-MCNC: 6.3 % — HIGH (ref 4–5.6)
HCT VFR BLD CALC: 39.5 % — SIGNIFICANT CHANGE UP (ref 34.5–45)
HGB BLD-MCNC: 13.5 G/DL — SIGNIFICANT CHANGE UP (ref 11.5–15.5)
INR BLD: 1.07 RATIO — SIGNIFICANT CHANGE UP (ref 0.88–1.16)
LYMPHOCYTES # BLD AUTO: 1.5 K/UL — SIGNIFICANT CHANGE UP (ref 1–3.3)
LYMPHOCYTES # BLD AUTO: 32 % — SIGNIFICANT CHANGE UP (ref 13–44)
MAGNESIUM SERPL-MCNC: 2 MG/DL — SIGNIFICANT CHANGE UP (ref 1.6–2.6)
MCHC RBC-ENTMCNC: 31.2 PG — SIGNIFICANT CHANGE UP (ref 27–34)
MCHC RBC-ENTMCNC: 34.3 GM/DL — SIGNIFICANT CHANGE UP (ref 32–36)
MCV RBC AUTO: 91 FL — SIGNIFICANT CHANGE UP (ref 80–100)
MONOCYTES # BLD AUTO: 0.4 K/UL — SIGNIFICANT CHANGE UP (ref 0–0.9)
MONOCYTES NFR BLD AUTO: 8.6 % — SIGNIFICANT CHANGE UP (ref 2–14)
NEUTROPHILS # BLD AUTO: 2.8 K/UL — SIGNIFICANT CHANGE UP (ref 1.8–7.4)
NEUTROPHILS NFR BLD AUTO: 57.1 % — SIGNIFICANT CHANGE UP (ref 43–77)
PHOSPHATE SERPL-MCNC: 2.9 MG/DL — SIGNIFICANT CHANGE UP (ref 2.5–4.5)
PLATELET # BLD AUTO: 147 K/UL — LOW (ref 150–400)
POTASSIUM SERPL-MCNC: 3.7 MMOL/L — SIGNIFICANT CHANGE UP (ref 3.5–5.3)
POTASSIUM SERPL-SCNC: 3.7 MMOL/L — SIGNIFICANT CHANGE UP (ref 3.5–5.3)
PROT SERPL-MCNC: 6.9 G/DL — SIGNIFICANT CHANGE UP (ref 6–8.3)
PROTHROM AB SERPL-ACNC: 11.6 SEC — SIGNIFICANT CHANGE UP (ref 9.8–12.7)
RBC # BLD: 4.34 M/UL — SIGNIFICANT CHANGE UP (ref 3.8–5.2)
RBC # FLD: 13 % — SIGNIFICANT CHANGE UP (ref 10.3–14.5)
SODIUM SERPL-SCNC: 143 MMOL/L — SIGNIFICANT CHANGE UP (ref 135–145)
WBC # BLD: 4.8 K/UL — SIGNIFICANT CHANGE UP (ref 3.8–10.5)
WBC # FLD AUTO: 4.8 K/UL — SIGNIFICANT CHANGE UP (ref 3.8–10.5)

## 2018-01-20 PROCEDURE — 99223 1ST HOSP IP/OBS HIGH 75: CPT

## 2018-01-20 PROCEDURE — 74177 CT ABD & PELVIS W/CONTRAST: CPT | Mod: 26

## 2018-01-20 PROCEDURE — 71260 CT THORAX DX C+: CPT | Mod: 26

## 2018-01-20 RX ORDER — WARFARIN SODIUM 2.5 MG/1
5 TABLET ORAL ONCE
Qty: 0 | Refills: 0 | Status: COMPLETED | OUTPATIENT
Start: 2018-01-20 | End: 2018-01-20

## 2018-01-20 RX ORDER — DIPHENHYDRAMINE HCL 50 MG
50 CAPSULE ORAL ONCE
Qty: 0 | Refills: 0 | Status: COMPLETED | OUTPATIENT
Start: 2018-01-20 | End: 2018-01-20

## 2018-01-20 RX ADMIN — ENOXAPARIN SODIUM 80 MILLIGRAM(S): 100 INJECTION SUBCUTANEOUS at 17:17

## 2018-01-20 RX ADMIN — Medication 1: at 18:21

## 2018-01-20 RX ADMIN — Medication 81 MILLIGRAM(S): at 12:15

## 2018-01-20 RX ADMIN — Medication 1000 UNIT(S): at 12:15

## 2018-01-20 RX ADMIN — ENOXAPARIN SODIUM 80 MILLIGRAM(S): 100 INJECTION SUBCUTANEOUS at 05:31

## 2018-01-20 RX ADMIN — WARFARIN SODIUM 5 MILLIGRAM(S): 2.5 TABLET ORAL at 22:39

## 2018-01-20 RX ADMIN — Medication 40 MILLIGRAM(S): at 18:21

## 2018-01-20 RX ADMIN — Medication 50 MILLIGRAM(S): at 21:32

## 2018-01-20 NOTE — CONSULT NOTE ADULT - ASSESSMENT
82 F PMH Afib, lung cancer s/p R lobectomy, malignant melanoma, breast CA s/p lumpectomy (cancer free since 2015), DM, R DVT previously on coumadin p/w LLE swelling x 3 weeks.    Plan:  1.  Continue with anticoagulation with lovenox 1mg/kg BID and coumadin dosing  2.  Goal INR is 2-3  3.  I am concerned about an underlying malignancy.  Please obtain  ·	CT chest, abdomen and pelvis with IV and PO contrast to rule out malignancy  ·	Stool guaiac        Discussed with family and Dr. Will in detail.      Thanks      Andrzej Stroud  Vascular Medicine and Cardiology  [id number]

## 2018-01-20 NOTE — CHART NOTE - NSCHARTNOTEFT_GEN_A_CORE
D/w pt daughter, who reported that pt has a contrast allergy. In the past she has had contrast in July 2016 which daughter reported that she became dizzy and very hypertensive. She was told by her the pt's PMD- that she will need to be pre-medicated with steroids. She had another CT in Sept 2016 , in which she was pre- medicated w/o and it was uneventful. Discuss above with Dr. Will, will pre- medicate according to CT protocol.   Nilda RUBIO

## 2018-01-20 NOTE — CONSULT NOTE ADULT - SUBJECTIVE AND OBJECTIVE BOX
PAGER:  [phone]               [facility] [id number]              EMAIL [email or web address]   OFFICE [phone]                              ********VASCULAR MEDICINE & CARDIOLOGY PROGRESS NOTE********                            CC:  LLE DVT    INTERVAL HISTORY:    Admitted.  Family at bedside.  now on Lovenox with couamdin dosing.  Complains of Left leg edema and discomfort, but improving.  Walking around without any chest pain or shortness of breath. She was previously on just aspirin 81mg daily.  Complains of ongoing abdominal fullness.  States she is 'prone to cancer"    HISTORY OF PRESENT ILLNESS:  HPI:  82 F PMH Afib, lung cancer s/p R lobectomy, malignant melanoma, breast CA s/p lumpectomy (cancer free since 2015), DM, R DVT previously on coumadin p/w LLE swelling x 3 weeks.  Pt was seeing her o/p vascular doctor, who referred her for o/p doppler study that showed significant L sided DVT. Currently pt c/o ongoing L sided leg swelling. +shooting pains from ankle up to thigh. Pt notes she has been more immobile over last few weeks 2/2 weather, usually is ambulatory occasionally with cane but not as much recently. +recent plane travel to [place] twice to visit ailing brother. Pt had prior DVT in setting of diagnosed lung malignancy in 2014; was on coumadin for ~1 yr and then off; has been off for about 2+ years prior to formation of current DVT. Denies CP/sob/f. Denies n/v/d.     VS: 97.8, 59, 132/84, 15, 95% RA.  Labs: cbc nondx, coags wnl, mild hypernatremia, cmp c/w stable ckd3.  CXR prelim lungs grossly clear, limited study.  LLE Doppler showed thrombus in L common fem, fem, and pop veins including tib peroneal trunk.  Pt received first dose lovenox 80 x 1 in ER. (19 Jan 2018 20:03)          Allergies    codeine (Fever; Rash)    Intolerances    	    MEDICATIONS:  aspirin enteric coated 81 milliGRAM(s) Oral daily  enoxaparin Injectable 80 milliGRAM(s) SubCutaneous two times a day            dextrose 50% Injectable 12.5 Gram(s) IV Push once  dextrose 50% Injectable 25 Gram(s) IV Push once  dextrose 50% Injectable 25 Gram(s) IV Push once  dextrose Gel 1 Dose(s) Oral once PRN  glucagon  Injectable 1 milliGRAM(s) IntraMuscular once PRN  insulin lispro (HumaLOG) corrective regimen sliding scale   SubCutaneous three times a day before meals  insulin lispro (HumaLOG) corrective regimen sliding scale   SubCutaneous at bedtime    cholecalciferol 1000 Unit(s) Oral daily  dextrose 5%. 1000 milliLiter(s) IV Continuous <Continuous>      PAST MEDICAL & SURGICAL HISTORY:  Breast cancer  Lung cancer, left  Diabetes mellitus: diet controlled diabetes  Afib  S/P colectomy  Status post left hip replacement  S/P lobectomy of lung: s/p LLL Lobectomy 6/2014 for lung CA      FAMILY HISTORY:  Family history of lung cancer (Sibling)  Family history of secondary cancer of bone and bone marrow  Family history of dementia      SOCIAL HISTORY:  unchanged    REVIEW OF SYSTEMS:  CONSTITUTIONAL: No fever, weight loss, or fatigue  EYES: No eye pain, visual disturbances, or discharge  ENMT:  No difficulty hearing, tinnitus, vertigo; No sinus or throat pain  NECK: No pain or stiffness  RESPIRATORY: No cough, wheezing, chills or hemoptysis; No Shortness of Breath  CARDIOVASCULAR: No chest pain, palpitations, passing out, dizziness, LEFT leg swelling  GASTROINTESTINAL: No abdominal or epigastric pain. No nausea, vomiting, or hematemesis; No diarrhea or constipation. No melena or hematochezia .Abdominal fullness  GENITOURINARY: No dysuria, frequency, hematuria, or incontinence  NEUROLOGICAL: No headaches, memory loss, loss of strength, numbness, or tremors  SKIN: No itching, burning, rashes, or lesions   LYMPH Nodes: No enlarged glands  ENDOCRINE: No heat or cold intolerance; No hair loss  MUSCULOSKELETAL: No joint pain or swelling; No muscle, back, or extremity pain  PSYCHIATRIC: No depression, anxiety, mood swings, or difficulty sleeping  HEME/LYMPH: No easy bruising, or bleeding gums  ALLERY AND IMMUNOLOGIC: No hives or eczema	    [x ] All others negative	  [ ] Unable to obtain    PHYSICAL EXAM:  T(C): 36.7 (01-20-18 @ 06:01), Max: 36.8 (01-19-18 @ 21:47)  HR: 60 (01-20-18 @ 06:01) (59 - 65)  BP: 135/71 (01-20-18 @ 06:01) (132/84 - 155/78)  RR: 18 (01-20-18 @ 06:01) (15 - 20)  SpO2: 96% (01-20-18 @ 06:01) (95% - 97%)  Wt(kg): --  I&O's Summary    20 Jan 2018 07:01  -  20 Jan 2018 13:22  --------------------------------------------------------  IN: 360 mL / OUT: 1 mL / NET: 359 mL        Appearance: Normal	  HEENT:   Normal oral mucosa, PERRL, EOMI	  Lymphatic: No lymphadenopathy  Cardiovascular: Normal S1 S2, No JVD.  Respiratory: Coarse breath sounds bilaterally.   Psychiatry: A & O x 3, Mood & affect appropriate  Gastrointestinal:  Soft, Non-tender, + BS	  Skin: No rashes, No ecchymoses, No cyanosis	  Neurologic: Non-focal  Extremities: LLE edema, tenderness and erythema.  RLE edema mild      LABS:	 	    CBC Full  -  ( 20 Jan 2018 06:03 )  WBC Count : 4.8 K/uL  Hemoglobin : 13.5 g/dL  Hematocrit : 39.5 %  Platelet Count - Automated : 147 K/uL  Mean Cell Volume : 91.0 fl  Mean Cell Hemoglobin : 31.2 pg  Mean Cell Hemoglobin Concentration : 34.3 gm/dL  Auto Neutrophil # : 2.8 K/uL  Auto Lymphocyte # : 1.5 K/uL  Auto Monocyte # : 0.4 K/uL  Auto Eosinophil # : 0.0 K/uL  Auto Basophil # : 0.1 K/uL  Auto Neutrophil % : 57.1 %  Auto Lymphocyte % : 32.0 %  Auto Monocyte % : 8.6 %  Auto Eosinophil % : 0.1 %  Auto Basophil % : 2.2 %    01-20    143  |  108  |  23  ----------------------------<  115<H>  3.7   |  23  |  1.00  01-19    147<H>  |  108  |  27<H>  ----------------------------<  97  4.6   |  26  |  1.08    Ca    9.2      20 Jan 2018 06:03  Ca    10.2      19 Jan 2018 16:44  Phos  2.9     01-20  Mg     2.0     01-20    TPro  6.9  /  Alb  3.7  /  TBili  0.5  /  DBili  x   /  AST  11  /  ALT  9<L>  /  AlkPhos  64  01-20  TPro  8.4<H>  /  Alb  4.4  /  TBili  0.4  /  DBili  x   /  AST  14  /  ALT  10  /  AlkPhos  77  01-19

## 2018-01-21 LAB
ANION GAP SERPL CALC-SCNC: 16 MMOL/L — SIGNIFICANT CHANGE UP (ref 5–17)
BUN SERPL-MCNC: 26 MG/DL — HIGH (ref 7–23)
CALCIUM SERPL-MCNC: 9.8 MG/DL — SIGNIFICANT CHANGE UP (ref 8.4–10.5)
CHLORIDE SERPL-SCNC: 104 MMOL/L — SIGNIFICANT CHANGE UP (ref 96–108)
CO2 SERPL-SCNC: 21 MMOL/L — LOW (ref 22–31)
CREAT SERPL-MCNC: 1 MG/DL — SIGNIFICANT CHANGE UP (ref 0.5–1.3)
GLUCOSE SERPL-MCNC: 147 MG/DL — HIGH (ref 70–99)
INR BLD: 1.08 RATIO — SIGNIFICANT CHANGE UP (ref 0.88–1.16)
POTASSIUM SERPL-MCNC: 4 MMOL/L — SIGNIFICANT CHANGE UP (ref 3.5–5.3)
POTASSIUM SERPL-SCNC: 4 MMOL/L — SIGNIFICANT CHANGE UP (ref 3.5–5.3)
PROTHROM AB SERPL-ACNC: 11.8 SEC — SIGNIFICANT CHANGE UP (ref 9.8–12.7)
SODIUM SERPL-SCNC: 141 MMOL/L — SIGNIFICANT CHANGE UP (ref 135–145)

## 2018-01-21 PROCEDURE — 99233 SBSQ HOSP IP/OBS HIGH 50: CPT

## 2018-01-21 RX ORDER — WARFARIN SODIUM 2.5 MG/1
5 TABLET ORAL ONCE
Qty: 0 | Refills: 0 | Status: COMPLETED | OUTPATIENT
Start: 2018-01-21 | End: 2018-01-21

## 2018-01-21 RX ADMIN — Medication 81 MILLIGRAM(S): at 11:52

## 2018-01-21 RX ADMIN — ENOXAPARIN SODIUM 80 MILLIGRAM(S): 100 INJECTION SUBCUTANEOUS at 06:19

## 2018-01-21 RX ADMIN — WARFARIN SODIUM 5 MILLIGRAM(S): 2.5 TABLET ORAL at 23:19

## 2018-01-21 RX ADMIN — ENOXAPARIN SODIUM 80 MILLIGRAM(S): 100 INJECTION SUBCUTANEOUS at 17:26

## 2018-01-21 RX ADMIN — Medication 1000 UNIT(S): at 11:52

## 2018-01-21 NOTE — PHYSICAL THERAPY INITIAL EVALUATION ADULT - PERTINENT HX OF CURRENT PROBLEM, REHAB EVAL
82yoF, pmhx Afib, lung cancer s/p R lobectomy, malignant melanoma, breast CA s/p lumpectomy, DM, R DVT, p/w LLE swelling x 3 weeks. Pt was seeing her o/p vascular doctor, who referred her for o/p doppler study that showed significant L sided DVT. Currently pt c/o ongoing L sided leg swelling. +shooting pains from ankle up to thigh.

## 2018-01-21 NOTE — PHYSICAL THERAPY INITIAL EVALUATION ADULT - PRECAUTIONS/LIMITATIONS, REHAB EVAL
fall precautions/Pt notes she has been more immobile over last few weeks 2/2 weather, usually is ambulatory occasionally with cane but not as much recently. CXR 1/19 Trace bilateral pleural effusions

## 2018-01-21 NOTE — PHYSICAL THERAPY INITIAL EVALUATION ADULT - DISCHARGE DISPOSITION, PT EVAL
TBD pending further functional assessment Home with PT for functional/safety assessment, gait/endurance training, general strengthening and fall risk prevention. pt would require supervision/assist with all ADLs and functional activities upon DC. Pt and daughter declining home PT and requesting outpt PT/home w/ assist/home w/ home PT

## 2018-01-21 NOTE — CHART NOTE - NSCHARTNOTEFT_GEN_A_CORE
Called by RN pt c/o dizziness while taking contrast. Pt had reported symptoms of hypertension and dizziness previously with taking contrast. Pt received pre medication with Benadryl 50 mg as per policy.  Pt otherwise without any c/o HA, SOB, CP.  Pt vital signs stable.    Vital Signs Last 24 Hrs  T(C): 36.2 (20 Jan 2018 17:18), Max: 36.7 (20 Jan 2018 06:01)  T(F): 97.2 (20 Jan 2018 17:18), Max: 98.1 (20 Jan 2018 06:01)  HR: 66 (20 Jan 2018 21:56) (60 - 74)  BP: 157/83 (20 Jan 2018 21:56) (135/71 - 171/81)  BP(mean): --  RR: 18 (20 Jan 2018 21:56) (18 - 18)  SpO2: 93% (20 Jan 2018 21:56) (93% - 96%)    Pt now sleeping with no complains of dizziness, s/p CT scan, preliminary results pending, tolerated scan.   Will follow up.    Emily Crowe NP  [id number]

## 2018-01-21 NOTE — PHYSICAL THERAPY INITIAL EVALUATION ADULT - LIVES WITH, PROFILE
as per pt, pt lives alone in a 2 family house. pt lives on second floor, daughter on first floor. 3 KARLOS, 10 STI to get to apt. PTA, pt independent with all ADLs and functional activities with occasional use of cane for community distances.

## 2018-01-21 NOTE — PHYSICAL THERAPY INITIAL EVALUATION ADULT - GAIT DEVIATIONS NOTED, PT EVAL
decreased velocity of limb motion/decreased step length/decreased weight-shifting ability/decreased lucretia/decreased stride length

## 2018-01-22 LAB
ANION GAP SERPL CALC-SCNC: 11 MMOL/L — SIGNIFICANT CHANGE UP (ref 5–17)
BUN SERPL-MCNC: 33 MG/DL — HIGH (ref 7–23)
CALCIUM SERPL-MCNC: 9.5 MG/DL — SIGNIFICANT CHANGE UP (ref 8.4–10.5)
CHLORIDE SERPL-SCNC: 108 MMOL/L — SIGNIFICANT CHANGE UP (ref 96–108)
CO2 SERPL-SCNC: 23 MMOL/L — SIGNIFICANT CHANGE UP (ref 22–31)
CREAT SERPL-MCNC: 1.19 MG/DL — SIGNIFICANT CHANGE UP (ref 0.5–1.3)
GLUCOSE SERPL-MCNC: 107 MG/DL — HIGH (ref 70–99)
HCT VFR BLD CALC: 37.3 % — SIGNIFICANT CHANGE UP (ref 34.5–45)
HGB BLD-MCNC: 13.1 G/DL — SIGNIFICANT CHANGE UP (ref 11.5–15.5)
INR BLD: 1.33 RATIO — HIGH (ref 0.88–1.16)
MCHC RBC-ENTMCNC: 32 PG — SIGNIFICANT CHANGE UP (ref 27–34)
MCHC RBC-ENTMCNC: 35.2 GM/DL — SIGNIFICANT CHANGE UP (ref 32–36)
MCV RBC AUTO: 91 FL — SIGNIFICANT CHANGE UP (ref 80–100)
PLATELET # BLD AUTO: 165 K/UL — SIGNIFICANT CHANGE UP (ref 150–400)
POTASSIUM SERPL-MCNC: 3.7 MMOL/L — SIGNIFICANT CHANGE UP (ref 3.5–5.3)
POTASSIUM SERPL-SCNC: 3.7 MMOL/L — SIGNIFICANT CHANGE UP (ref 3.5–5.3)
PROTHROM AB SERPL-ACNC: 14.5 SEC — HIGH (ref 9.8–12.7)
RBC # BLD: 4.09 M/UL — SIGNIFICANT CHANGE UP (ref 3.8–5.2)
RBC # FLD: 13.3 % — SIGNIFICANT CHANGE UP (ref 10.3–14.5)
SODIUM SERPL-SCNC: 142 MMOL/L — SIGNIFICANT CHANGE UP (ref 135–145)
WBC # BLD: 5.6 K/UL — SIGNIFICANT CHANGE UP (ref 3.8–10.5)
WBC # FLD AUTO: 5.6 K/UL — SIGNIFICANT CHANGE UP (ref 3.8–10.5)

## 2018-01-22 PROCEDURE — 99232 SBSQ HOSP IP/OBS MODERATE 35: CPT

## 2018-01-22 RX ORDER — WARFARIN SODIUM 2.5 MG/1
5 TABLET ORAL ONCE
Qty: 0 | Refills: 0 | Status: COMPLETED | OUTPATIENT
Start: 2018-01-22 | End: 2018-01-22

## 2018-01-22 RX ADMIN — ENOXAPARIN SODIUM 80 MILLIGRAM(S): 100 INJECTION SUBCUTANEOUS at 05:23

## 2018-01-22 RX ADMIN — Medication 1000 UNIT(S): at 11:51

## 2018-01-22 RX ADMIN — WARFARIN SODIUM 5 MILLIGRAM(S): 2.5 TABLET ORAL at 22:10

## 2018-01-22 RX ADMIN — Medication 81 MILLIGRAM(S): at 11:51

## 2018-01-22 RX ADMIN — ENOXAPARIN SODIUM 80 MILLIGRAM(S): 100 INJECTION SUBCUTANEOUS at 22:10

## 2018-01-23 LAB
CANCER AG125 SERPL-ACNC: 15 U/ML — SIGNIFICANT CHANGE UP
CANCER AG19-9 SERPL-ACNC: 28.4 U/ML — SIGNIFICANT CHANGE UP
CANCER AG27-29 SERPL-ACNC: 16.6 U/ML — SIGNIFICANT CHANGE UP (ref 0–37.7)
CEA SERPL-MCNC: 2.1 NG/ML — SIGNIFICANT CHANGE UP (ref 0–3.8)
INR BLD: 1.55 RATIO — HIGH (ref 0.88–1.16)
PROTHROM AB SERPL-ACNC: 17.1 SEC — HIGH (ref 9.8–12.7)

## 2018-01-23 PROCEDURE — 99232 SBSQ HOSP IP/OBS MODERATE 35: CPT

## 2018-01-23 RX ORDER — WARFARIN SODIUM 2.5 MG/1
6 TABLET ORAL ONCE
Qty: 0 | Refills: 0 | Status: COMPLETED | OUTPATIENT
Start: 2018-01-23 | End: 2018-01-23

## 2018-01-23 RX ADMIN — ENOXAPARIN SODIUM 80 MILLIGRAM(S): 100 INJECTION SUBCUTANEOUS at 08:56

## 2018-01-23 RX ADMIN — Medication 81 MILLIGRAM(S): at 13:51

## 2018-01-23 RX ADMIN — ENOXAPARIN SODIUM 80 MILLIGRAM(S): 100 INJECTION SUBCUTANEOUS at 18:26

## 2018-01-23 RX ADMIN — Medication 1000 UNIT(S): at 13:51

## 2018-01-23 RX ADMIN — WARFARIN SODIUM 6 MILLIGRAM(S): 2.5 TABLET ORAL at 22:20

## 2018-01-23 NOTE — PROVIDER CONTACT NOTE (OTHER) - ACTION/TREATMENT ORDERED:
NP made aware, will see patient, no further orders given at this time, pt safety maintained, will continue to monitor

## 2018-01-23 NOTE — PROVIDER CONTACT NOTE (OTHER) - ASSESSMENT
+1 edema LLE, denies pain at this time, able to ambulate, red but not hot to touch, denies numbness/tingling, no necrosis noted, capillary refill WDL     - education provided on elevating extremities      -pt on lovenox and coumadin     - warfarin education packet provided

## 2018-01-24 LAB
ALBUMIN SERPL ELPH-MCNC: 3.6 G/DL — SIGNIFICANT CHANGE UP (ref 3.3–5)
ALP SERPL-CCNC: 58 U/L — SIGNIFICANT CHANGE UP (ref 40–120)
ALT FLD-CCNC: 51 U/L RC — HIGH (ref 10–45)
ANION GAP SERPL CALC-SCNC: 11 MMOL/L — SIGNIFICANT CHANGE UP (ref 5–17)
AST SERPL-CCNC: 41 U/L — HIGH (ref 10–40)
BILIRUB SERPL-MCNC: 0.3 MG/DL — SIGNIFICANT CHANGE UP (ref 0.2–1.2)
BUN SERPL-MCNC: 20 MG/DL — SIGNIFICANT CHANGE UP (ref 7–23)
CALCIUM SERPL-MCNC: 9.3 MG/DL — SIGNIFICANT CHANGE UP (ref 8.4–10.5)
CHLORIDE SERPL-SCNC: 109 MMOL/L — HIGH (ref 96–108)
CO2 SERPL-SCNC: 23 MMOL/L — SIGNIFICANT CHANGE UP (ref 22–31)
CREAT SERPL-MCNC: 0.96 MG/DL — SIGNIFICANT CHANGE UP (ref 0.5–1.3)
GLUCOSE SERPL-MCNC: 112 MG/DL — HIGH (ref 70–99)
HCT VFR BLD CALC: 39.1 % — SIGNIFICANT CHANGE UP (ref 34.5–45)
HGB BLD-MCNC: 13.8 G/DL — SIGNIFICANT CHANGE UP (ref 11.5–15.5)
INR BLD: 1.77 RATIO — HIGH (ref 0.88–1.16)
MCHC RBC-ENTMCNC: 32.4 PG — SIGNIFICANT CHANGE UP (ref 27–34)
MCHC RBC-ENTMCNC: 35.1 GM/DL — SIGNIFICANT CHANGE UP (ref 32–36)
MCV RBC AUTO: 92.1 FL — SIGNIFICANT CHANGE UP (ref 80–100)
PLATELET # BLD AUTO: 135 K/UL — LOW (ref 150–400)
POTASSIUM SERPL-MCNC: 3.9 MMOL/L — SIGNIFICANT CHANGE UP (ref 3.5–5.3)
POTASSIUM SERPL-SCNC: 3.9 MMOL/L — SIGNIFICANT CHANGE UP (ref 3.5–5.3)
PROT SERPL-MCNC: 6.6 G/DL — SIGNIFICANT CHANGE UP (ref 6–8.3)
PROTHROM AB SERPL-ACNC: 19.3 SEC — HIGH (ref 9.8–12.7)
RBC # BLD: 4.25 M/UL — SIGNIFICANT CHANGE UP (ref 3.8–5.2)
RBC # FLD: 13.3 % — SIGNIFICANT CHANGE UP (ref 10.3–14.5)
SODIUM SERPL-SCNC: 143 MMOL/L — SIGNIFICANT CHANGE UP (ref 135–145)
WBC # BLD: 4.7 K/UL — SIGNIFICANT CHANGE UP (ref 3.8–10.5)
WBC # FLD AUTO: 4.7 K/UL — SIGNIFICANT CHANGE UP (ref 3.8–10.5)

## 2018-01-24 PROCEDURE — 99232 SBSQ HOSP IP/OBS MODERATE 35: CPT

## 2018-01-24 RX ORDER — WARFARIN SODIUM 2.5 MG/1
6 TABLET ORAL ONCE
Qty: 0 | Refills: 0 | Status: COMPLETED | OUTPATIENT
Start: 2018-01-24 | End: 2018-01-24

## 2018-01-24 RX ADMIN — ENOXAPARIN SODIUM 80 MILLIGRAM(S): 100 INJECTION SUBCUTANEOUS at 22:23

## 2018-01-24 RX ADMIN — Medication 81 MILLIGRAM(S): at 12:59

## 2018-01-24 RX ADMIN — Medication 1000 UNIT(S): at 12:59

## 2018-01-24 RX ADMIN — WARFARIN SODIUM 6 MILLIGRAM(S): 2.5 TABLET ORAL at 22:24

## 2018-01-24 RX ADMIN — ENOXAPARIN SODIUM 80 MILLIGRAM(S): 100 INJECTION SUBCUTANEOUS at 05:27

## 2018-01-24 NOTE — PROGRESS NOTE ADULT - PROBLEM SELECTOR PLAN 1
- confirmed on LE dopplers  - c/w 80 iv Lovenox bid  - c/w coumadin 5 mg daily  - pt prefers coumadin as opposed to NoAC.  (she had it in the past, completed course 3 years ago)  Offered pt d/c planning with Lovenox at home, but pt refused.  She would rather stay here until INR is therapeutic.  CT chest/abx/pelvis neg for malignancy.  Card requests tumor markers to be sent. CEA and  normal. Ca 27 pending.  INR 1.77.
- confirmed on LE dopplers  - c/w 80 iv Lovenox bid  - c/w coumadin 5 mg daily  - pt prefers coumadin as opposed to NoAC.  (she had it in the past, completed course 3 years ago)  Offered pt d/c planning with Lovenox at home, but pt refused.  She would rather stay here until INR is therapeutic.  I discussed at length with pt and the daughter at bedside advocating d/c planning, but no avail.   CT chest/abx/pelvis neg for malignancy.
- confirmed on LE dopplers  - c/w 80 iv Lovenox bid  - c/w coumadin 5 mg daily  - pt prefers coumadin as opposed to NoAC.  (she had it in the past, completed course 3 years ago)
- confirmed on LE dopplers  - c/w 80 iv Lovenox bid  - c/w coumadin 5 mg daily  - pt prefers coumadin as opposed to NoAC.  (she had it in the past, completed course 3 years ago)  Offered pt d/c planning with Lovenox at home, but pt refused.  She would rather stay here until INR is therapeutic.  I discussed at length with pt and the daughter at bedside advocating d/c planning, but no avail.   CT chest/abx/pelvis neg for malignancy.  Card requests tumor markers to be sent.
- confirmed on LE dopplers  - c/w 80 iv Lovenox bid  - c/w coumadin 5 mg daily  - pt prefers coumadin as opposed to NoAC.  (she had it in the past, completed course 3 years ago)  Offered pt d/c planning with Lovenox at home, but pt refused.  She would rather stay here until INR is therapeutic.  CT chest/abx/pelvis neg for malignancy.  Card requests tumor markers to be sent. CEA and  normal. Ca 27 pending.

## 2018-01-24 NOTE — PROGRESS NOTE ADULT - PROBLEM SELECTOR PROBLEM 3
Malignant neoplasm of lung, unspecified laterality, unspecified part of lung

## 2018-01-24 NOTE — PROGRESS NOTE ADULT - PROBLEM SELECTOR PLAN 4
- Lovenox to coumadin bridge
-Lovenox to coumadin bridge

## 2018-01-24 NOTE — PROGRESS NOTE ADULT - PROBLEM SELECTOR PROBLEM 1
Acute deep vein thrombosis (DVT) of femoral vein of left lower extremity

## 2018-01-24 NOTE — PROGRESS NOTE ADULT - PROBLEM SELECTOR PLAN 3
-s/p lobectomy, no recurrence  -pt has had "breathing issues" since diagnosis; ?copd given longstanding smoking hx.   -pt is on Anoro Elipta; instructed family to bring in and have pharmacy verify
-s/p lobectomy, no recurrence  -pt has had "breathing issues" since diagnosis; ?copd given longstanding smoking hx.   -pt is in anoro Elipta; instructed family to bring in and have pharmacy verify before next dose tomorrow
-s/p lobectomy, no recurrence  -pt has had "breathing issues" since diagnosis; ?copd given longstanding smoking hx.   -pt is on Anoro Elipta; instructed family to bring in and have pharmacy verify

## 2018-01-24 NOTE — PROGRESS NOTE ADULT - PROBLEM SELECTOR PLAN 2
- c/w insulin sliding scale as needed  - HgbA1c 6.3%  - consistent carb diet
- c/w insulin sliding scale as needed  - HgbA1c 6.3%  - consistent carb diet
- Start HISS, check fsg qac/qhs  - HgbA1c 6.3%  - consistent carb diet
- c/w insulin sliding scale as needed  - HgbA1c 6.3%  - consistent carb diet
- c/w insulin sliding scale as needed  - HgbA1c 6.3%  - consistent carb diet

## 2018-01-24 NOTE — PROGRESS NOTE ADULT - ATTENDING COMMENTS
Continue Lovenox --> Coumadin  Tumor markers to be sent in AM  Please offer mild compression stockings to patient.
D/c planning home once INR therapeutic.     - Dr. MYNOR Will ([facility])  - [phone]
D/c planning home once INR therapeutic.     - Dr. MYNOR Will ([facility])  - [phone]
- Dr. MYNOR Will ([facility])  - [phone]
- Dr. MYNOR Will ([facility])  - [phone]
- Dr. MYNOR Will ([facility])  - [phone]

## 2018-01-25 ENCOUNTER — TRANSCRIPTION ENCOUNTER (OUTPATIENT)
Age: 83
End: 2018-01-25

## 2018-01-25 VITALS
TEMPERATURE: 99 F | OXYGEN SATURATION: 94 % | HEART RATE: 73 BPM | DIASTOLIC BLOOD PRESSURE: 75 MMHG | SYSTOLIC BLOOD PRESSURE: 144 MMHG | RESPIRATION RATE: 18 BRPM

## 2018-01-25 PROBLEM — C50.919 MALIGNANT NEOPLASM OF UNSPECIFIED SITE OF UNSPECIFIED FEMALE BREAST: Chronic | Status: ACTIVE | Noted: 2018-01-19

## 2018-01-25 LAB
HCT VFR BLD CALC: 44.1 % — SIGNIFICANT CHANGE UP (ref 34.5–45)
HGB BLD-MCNC: 14.3 G/DL — SIGNIFICANT CHANGE UP (ref 11.5–15.5)
INR BLD: 2.04 RATIO — HIGH (ref 0.88–1.16)
MCHC RBC-ENTMCNC: 30 PG — SIGNIFICANT CHANGE UP (ref 27–34)
MCHC RBC-ENTMCNC: 32.4 GM/DL — SIGNIFICANT CHANGE UP (ref 32–36)
MCV RBC AUTO: 92.6 FL — SIGNIFICANT CHANGE UP (ref 80–100)
PLATELET # BLD AUTO: 165 K/UL — SIGNIFICANT CHANGE UP (ref 150–400)
PROTHROM AB SERPL-ACNC: 22.3 SEC — HIGH (ref 9.8–12.7)
RBC # BLD: 4.76 M/UL — SIGNIFICANT CHANGE UP (ref 3.8–5.2)
RBC # FLD: 13.5 % — SIGNIFICANT CHANGE UP (ref 10.3–14.5)
WBC # BLD: 6.3 K/UL — SIGNIFICANT CHANGE UP (ref 3.8–10.5)
WBC # FLD AUTO: 6.3 K/UL — SIGNIFICANT CHANGE UP (ref 3.8–10.5)

## 2018-01-25 PROCEDURE — 99285 EMERGENCY DEPT VISIT HI MDM: CPT | Mod: 25

## 2018-01-25 PROCEDURE — 84100 ASSAY OF PHOSPHORUS: CPT

## 2018-01-25 PROCEDURE — 82330 ASSAY OF CALCIUM: CPT

## 2018-01-25 PROCEDURE — 86304 IMMUNOASSAY TUMOR CA 125: CPT

## 2018-01-25 PROCEDURE — 71260 CT THORAX DX C+: CPT

## 2018-01-25 PROCEDURE — 82803 BLOOD GASES ANY COMBINATION: CPT

## 2018-01-25 PROCEDURE — 82947 ASSAY GLUCOSE BLOOD QUANT: CPT

## 2018-01-25 PROCEDURE — 97110 THERAPEUTIC EXERCISES: CPT

## 2018-01-25 PROCEDURE — 86301 IMMUNOASSAY TUMOR CA 19-9: CPT

## 2018-01-25 PROCEDURE — 83735 ASSAY OF MAGNESIUM: CPT

## 2018-01-25 PROCEDURE — 84295 ASSAY OF SERUM SODIUM: CPT

## 2018-01-25 PROCEDURE — 97116 GAIT TRAINING THERAPY: CPT

## 2018-01-25 PROCEDURE — 80048 BASIC METABOLIC PNL TOTAL CA: CPT

## 2018-01-25 PROCEDURE — 83036 HEMOGLOBIN GLYCOSYLATED A1C: CPT

## 2018-01-25 PROCEDURE — 82962 GLUCOSE BLOOD TEST: CPT

## 2018-01-25 PROCEDURE — 82378 CARCINOEMBRYONIC ANTIGEN: CPT

## 2018-01-25 PROCEDURE — 84132 ASSAY OF SERUM POTASSIUM: CPT

## 2018-01-25 PROCEDURE — 97530 THERAPEUTIC ACTIVITIES: CPT

## 2018-01-25 PROCEDURE — 93005 ELECTROCARDIOGRAM TRACING: CPT

## 2018-01-25 PROCEDURE — 99232 SBSQ HOSP IP/OBS MODERATE 35: CPT

## 2018-01-25 PROCEDURE — 74177 CT ABD & PELVIS W/CONTRAST: CPT

## 2018-01-25 PROCEDURE — 85014 HEMATOCRIT: CPT

## 2018-01-25 PROCEDURE — 85730 THROMBOPLASTIN TIME PARTIAL: CPT

## 2018-01-25 PROCEDURE — 97161 PT EVAL LOW COMPLEX 20 MIN: CPT

## 2018-01-25 PROCEDURE — 71045 X-RAY EXAM CHEST 1 VIEW: CPT

## 2018-01-25 PROCEDURE — 86300 IMMUNOASSAY TUMOR CA 15-3: CPT

## 2018-01-25 PROCEDURE — 82435 ASSAY OF BLOOD CHLORIDE: CPT

## 2018-01-25 PROCEDURE — 80053 COMPREHEN METABOLIC PANEL: CPT

## 2018-01-25 PROCEDURE — 93971 EXTREMITY STUDY: CPT

## 2018-01-25 PROCEDURE — 83605 ASSAY OF LACTIC ACID: CPT

## 2018-01-25 PROCEDURE — 85610 PROTHROMBIN TIME: CPT

## 2018-01-25 PROCEDURE — 85027 COMPLETE CBC AUTOMATED: CPT

## 2018-01-25 RX ORDER — WARFARIN SODIUM 2.5 MG/1
5 TABLET ORAL ONCE
Qty: 0 | Refills: 0 | Status: DISCONTINUED | OUTPATIENT
Start: 2018-01-25 | End: 2018-01-25

## 2018-01-25 RX ADMIN — Medication 1000 UNIT(S): at 11:40

## 2018-01-25 RX ADMIN — Medication 81 MILLIGRAM(S): at 11:40

## 2018-01-25 RX ADMIN — ENOXAPARIN SODIUM 80 MILLIGRAM(S): 100 INJECTION SUBCUTANEOUS at 05:19

## 2018-01-25 NOTE — DISCHARGE NOTE ADULT - MEDICATION SUMMARY - MEDICATIONS TO TAKE
I will START or STAY ON the medications listed below when I get home from the hospital:    Physical Therapy for functional/safety assessment, gait/endurance training, general strengthening and fall risk prevention.  -- 3 times a week   -- Indication: For Physical therapy    Ecotrin  -- 81 milligram(s) by mouth once a day  -- Indication: For CAD    Coumadin 5 mg oral tablet  -- 1 tab(s) by mouth once a day   -- Do not take this drug if you are pregnant.  It is very important that you take or use this exactly as directed.  Do not skip doses or discontinue unless directed by your doctor.  Obtain medical advice before taking any non-prescription drugs as some may affect the action of this medication.    -- Indication: For DVT    Anoro Ellipta 62.5 mcg-25 mcg/inh inhalation powder  -- 1 puff(s) inhaled once a day  -- Indication: For COPD    Vitamin D3  -- Indication: For vitamin

## 2018-01-25 NOTE — DISCHARGE NOTE ADULT - PLAN OF CARE
On Coumadin Take Coumadin as directed   CHECK INR on MONDAY (1/29/18) HgA1C this admission.  Make sure you get your HgA1c checked every three months.  If you take oral diabetes medications, check your blood glucose two times a day.  If you take insulin, check your blood glucose before meals and at bedtime.  It's important not to skip any meals.  Keep a log of your blood glucose results and always take it with you to your doctor appointments.  Keep a list of your current medications including injectables and over the counter medications and bring this medication list with you to all your doctor appointments.  If you have not seen your ophthalmologist this year call for appointment.  Check your feet daily for redness, sores, or openings. Do not self treat. If no improvement in two days call your primary care physician for an appointment.  Low blood sugar (hypoglycemia) is a blood sugar below 70mg/dl. Check your blood sugar if you feel signs/symptoms of hypoglycemia. If your blood sugar is below 70 take 15 grams of carbohydrates (ex 4 oz of apple juice, 3-4 glucose tablets, or 4-6 oz of regular soda) wait 15 minutes and repeat blood sugar to make sure it comes up above 70.  If your blood sugar is above 70 and you are due for a meal, have a meal.  If you are not due for a meal have a snack.  This snack helps keeps your blood sugar at a safe range. Atrial fibrillation is the most common heart rhythm problem.  The condition puts you at risk for has stroke and heart attack  It helps if you control your blood pressure, not drink more than 1-2 alcohol drinks per day, cut down on caffeine, getting treatment for over active thyroid gland, and get regular exercise  Call your doctor if you feel your heart racing or beating unusually, chest tightness or pain, lightheaded, faint, shortness of breath especially with exercise  It is important to take your heart medication as prescribed  You may be on anticoagulation which is very important to take as directed - you may need blood work to monitor drug levels

## 2018-01-25 NOTE — DISCHARGE NOTE ADULT - HOSPITAL COURSE
82 F PMH Afib, lung cancer s/p R lobectomy, malignant melanoma, breast CA s/p lumpectomy (cancer free since 2015), DM, R DVT previously on coumadin p/w LLE swelling.  Acute deep vein thrombosis (DVT) of femoral vein of left lower extremity. confirmed on LE dopplers  - c/w coumadin 5 mg daily  - pt prefers coumadin as opposed to NoAC.  CT chest/abx/pelvis neg for malignancy.  f/u with PMD in one week 82 F PMH Afib, lung cancer s/p R lobectomy, malignant melanoma, breast CA s/p lumpectomy (cancer free since 2015), DM, R DVT previously on coumadin p/w LLE swelling.  Acute deep vein thrombosis (DVT) of femoral vein of left lower extremity. confirmed on LE dopplers  - c/w coumadin 5 mg daily  - pt prefers coumadin as opposed to NoAC.  CT chest/abx/pelvis neg for malignancy.  PT reccs: Home with Home PT, pt and daughter refused, Home with outpatient Physical therapy  f/u with PMD in one week

## 2018-01-25 NOTE — CHART NOTE - NSCHARTNOTEFT_GEN_A_CORE
No overnight events.  Pt feels well. Eager to go home.  Denied cp, sob, n/v/d.  no abdominal pain. No HA/dizziness.   INR is 2.04.  D/c planning home on coumadin.  INR check with Dr. Shanks on Friday. D/w covering physician Dr. Stroud.  Otherwise, she can also have INR check on Monday with her PCP.     - [facility]  - [phone]

## 2018-01-25 NOTE — PROGRESS NOTE ADULT - SUBJECTIVE AND OBJECTIVE BOX
Patient is a 82y old  Female who presents with a chief complaint of LE swelling (19 Jan 2018 20:03)      SUBJECTIVE / OVERNIGHT EVENTS:  No overnight events.  Pt feels well.   Denied cp, sob, n/v/d.  no abdominal pain. No HA/dizziness.   The daughter at bedside.       Vital Signs Last 24 Hrs  T(C): 36.3 (21 Jan 2018 05:20), Max: 36.6 (20 Jan 2018 13:19)  T(F): 97.4 (21 Jan 2018 05:20), Max: 97.9 (20 Jan 2018 13:19)  HR: 59 (21 Jan 2018 09:33) (59 - 74)  BP: 120/73 (21 Jan 2018 09:33) (120/73 - 171/81)  BP(mean): --  RR: 18 (21 Jan 2018 05:20) (18 - 18)  SpO2: 95% (21 Jan 2018 09:33) (93% - 95%)  I&O's Summary    20 Jan 2018 07:01  -  21 Jan 2018 07:00  --------------------------------------------------------  IN: 360 mL / OUT: 1 mL / NET: 359 mL        PHYSICAL EXAM:  GENERAL: NAD, Comfortable  HEAD:  Atraumatic, Normocephalic  EYES: EOMI, PERRLA, conjunctiva and sclera clear  NECK: Supple, No JVD  CHEST/LUNG: Clear to auscultation bilaterally; No wheeze  HEART: Regular rate and rhythm; No murmurs, rubs, or gallops  ABDOMEN: Soft, Nontender, Nondistended; Bowel sounds present  Neuro: AAO x 3, no focal deficit, 5/5 b/l extremities  EXTREMITIES:  +edema LE, no erythema.   SKIN: No rashes or lesions      LABS:                        13.5   4.8   )-----------( 147      ( 20 Jan 2018 06:03 )             39.5     01-21    141  |  104  |  26<H>  ----------------------------<  147<H>  4.0   |  21<L>  |  1.00    Ca    9.8      21 Jan 2018 05:55  Phos  2.9     01-20  Mg     2.0     01-20    TPro  6.9  /  Alb  3.7  /  TBili  0.5  /  DBili  x   /  AST  11  /  ALT  9<L>  /  AlkPhos  64  01-20    PT/INR - ( 21 Jan 2018 05:55 )   PT: 11.8 sec;   INR: 1.08 ratio         PTT - ( 20 Jan 2018 06:03 )  PTT:48.7 sec  CAPILLARY BLOOD GLUCOSE      POCT Blood Glucose.: 134 mg/dL (21 Jan 2018 08:52)  POCT Blood Glucose.: 120 mg/dL (20 Jan 2018 21:30)  POCT Blood Glucose.: 151 mg/dL (20 Jan 2018 17:36)  POCT Blood Glucose.: 121 mg/dL (20 Jan 2018 13:15)            RADIOLOGY & ADDITIONAL TESTS:    Imaging Personally Reviewed:  [x] YES  [ ] NO    Consultant(s) Notes Reviewed:  [x] YES  [ ] NO      MEDICATIONS  (STANDING):  aspirin enteric coated 81 milliGRAM(s) Oral daily  cholecalciferol 1000 Unit(s) Oral daily  dextrose 5%. 1000 milliLiter(s) (50 mL/Hr) IV Continuous <Continuous>  dextrose 50% Injectable 12.5 Gram(s) IV Push once  dextrose 50% Injectable 25 Gram(s) IV Push once  dextrose 50% Injectable 25 Gram(s) IV Push once  enoxaparin Injectable 80 milliGRAM(s) SubCutaneous two times a day  insulin lispro (HumaLOG) corrective regimen sliding scale   SubCutaneous three times a day before meals  insulin lispro (HumaLOG) corrective regimen sliding scale   SubCutaneous at bedtime  warfarin 5 milliGRAM(s) Oral once    MEDICATIONS  (PRN):  dextrose Gel 1 Dose(s) Oral once PRN Blood Glucose LESS THAN 70 milliGRAM(s)/deciliter  glucagon  Injectable 1 milliGRAM(s) IntraMuscular once PRN Glucose LESS THAN 70 milligrams/deciliter      Care Discussed with Consultants/Other Providers [x] YES  [ ] NO    HEALTH ISSUES - PROBLEM Dx:  Prophylactic measure: Prophylactic measure  Malignant neoplasm of lung, unspecified laterality, unspecified part of lung: Malignant neoplasm of lung, unspecified laterality, unspecified part of lung  Type 2 diabetes mellitus without complication, without long-term current use of insulin: Type 2 diabetes mellitus without complication, without long-term current use of insulin  Acute deep vein thrombosis (DVT) of femoral vein of left lower extremity: Acute deep vein thrombosis (DVT) of femoral vein of left lower extremity
PAGER:  [phone]               [facility] [id number]              [email or web address]   OFFICE [phone]                              ********VASCULAR MEDICINE & CARDIOLOGY PROGRESS NOTE********                            INTERVAL HISTORY:  Denies CP/SOB/PND or orthopnea.    MEDICATIONS  (STANDING):  aspirin enteric coated 81 milliGRAM(s) Oral daily  cholecalciferol 1000 Unit(s) Oral daily  dextrose 5%. 1000 milliLiter(s) (50 mL/Hr) IV Continuous <Continuous>  dextrose 50% Injectable 12.5 Gram(s) IV Push once  dextrose 50% Injectable 25 Gram(s) IV Push once  dextrose 50% Injectable 25 Gram(s) IV Push once  enoxaparin Injectable 80 milliGRAM(s) SubCutaneous two times a day  insulin lispro (HumaLOG) corrective regimen sliding scale   SubCutaneous three times a day before meals  insulin lispro (HumaLOG) corrective regimen sliding scale   SubCutaneous at bedtime  warfarin 6 milliGRAM(s) Oral once    MEDICATIONS  (PRN):  dextrose Gel 1 Dose(s) Oral once PRN Blood Glucose LESS THAN 70 milliGRAM(s)/deciliter  glucagon  Injectable 1 milliGRAM(s) IntraMuscular once PRN Glucose LESS THAN 70 milligrams/deciliter      REVIEW OF SYSTEMS:  CONSTITUTIONAL: No fever, weight loss, or fatigue  EYES: No eye pain, visual disturbances, or discharge  ENMT:  No difficulty hearing, tinnitus, vertigo; No sinus or throat pain  NECK: No pain or stiffness  RESPIRATORY: No cough, wheezing, chills or hemoptysis; No Shortness of Breath  CARDIOVASCULAR: No chest pain, palpitations, passing out, dizziness, or leg swelling  GASTROINTESTINAL: No abdominal or epigastric pain. No nausea, vomiting, or hematemesis; No diarrhea or constipation. No melena or hematochezia.  GENITOURINARY: No dysuria, frequency, hematuria, or incontinence  NEUROLOGICAL: No headaches, memory loss, loss of strength, numbness, or tremors  SKIN: No itching, burning, rashes, or lesions   LYMPH Nodes: No enlarged glands  ENDOCRINE: No heat or cold intolerance; No hair loss  PSYCHIATRIC: No depression, anxiety, mood swings, or difficulty sleeping  HEME/LYMPH: No easy bruising, or bleeding gums  ALLERY AND IMMUNOLOGIC: No hives or eczema	    [X] All others negative	  [ ] Unable to obtain    ICU Vital Signs Last 24 Hrs  T(C): 36.7 (25 Jan 2018 05:15), Max: 36.9 (24 Jan 2018 13:44)  T(F): 98 (25 Jan 2018 05:15), Max: 98.4 (24 Jan 2018 13:44)  HR: 107 (25 Jan 2018 05:15) (67 - 107)  BP: 137/75 (25 Jan 2018 05:15) (112/71 - 137/75)  BP(mean): --  ABP: --  ABP(mean): --  RR: 18 (25 Jan 2018 05:15) (18 - 18)  SpO2: 96% (25 Jan 2018 05:15) (96% - 98%)      Appearance: Normal	  HEENT:   Normal oral mucosa, PERRL, EOMI	  Lymphatic: No lymphadenopathy  Cardiovascular: Normal S1 S2, No JVD.  Respiratory: Coarse breath sounds bilaterally.   Psychiatry: A & O x 3, Mood & affect appropriate  Gastrointestinal:  Soft, Non-tender, + BS	  Skin: No rashes, No ecchymoses, No cyanosis	  Neurologic: Non-focal  Extremities: LLE edema, tenderness and erythema.  RLE edema mild                                   14.3   6.3   )-----------( 165      ( 25 Jan 2018 09:45 )             44.1   01-24    143  |  109<H>  |  20  ----------------------------<  112<H>  3.9   |  23  |  0.96    Ca    9.3      24 Jan 2018 06:12    TPro  6.6  /  Alb  3.6  /  TBili  0.3  /  DBili  x   /  AST  41<H>  /  ALT  51<H>  /  AlkPhos  58  01-24  PT/INR - ( 25 Jan 2018 09:45 )   PT: 22.3 sec;   INR: 2.04 ratio
PAGER:  [phone]               [facility] [id number]              [email or web address]   OFFICE [phone]                              ********VASCULAR MEDICINE & CARDIOLOGY PROGRESS NOTE********                        CC:  LLE DVT    24 hour :  No CP or SOB.  Left leg is swollen, but symptoms are better.  She is walking around without any issues.  CT a/p done, pending read.      INTERVAL HISTORY:    Admitted.  Family at bedside.  now on Lovenox with couamdin dosing.  Complains of Left leg edema and discomfort, but improving.  Walking around without any chest pain or shortness of breath. She was previously on just aspirin 81mg daily.  Complains of ongoing abdominal fullness.  States she is 'prone to cancer"    HISTORY OF PRESENT ILLNESS:  HPI:  82 F PMH Afib, lung cancer s/p R lobectomy, malignant melanoma, breast CA s/p lumpectomy (cancer free since 2015), DM, R DVT previously on coumadin p/w LLE swelling x 3 weeks.  Pt was seeing her o/p vascular doctor, who referred her for o/p doppler study that showed significant L sided DVT. Currently pt c/o ongoing L sided leg swelling. +shooting pains from ankle up to thigh. Pt notes she has been more immobile over last few weeks 2/2 weather, usually is ambulatory occasionally with cane but not as much recently. +recent plane travel to [place] twice to visit ailing brother. Pt had prior DVT in setting of diagnosed lung malignancy in 2014; was on coumadin for ~1 yr and then off; has been off for about 2+ years prior to formation of current DVT. Denies CP/sob/f. Denies n/v/d.     VS: 97.8, 59, 132/84, 15, 95% RA.  Labs: cbc nondx, coags wnl, mild hypernatremia, cmp c/w stable ckd3.  CXR prelim lungs grossly clear, limited study.  LLE Doppler showed thrombus in L common fem, fem, and pop veins including tib peroneal trunk.  Pt received first dose lovenox 80 x 1 in ER. (19 Jan 2018 20:03)          Allergies    codeine (Fever; Rash)    Intolerances    	  MEDICATIONS  (STANDING):  aspirin enteric coated 81 milliGRAM(s) Oral daily  cholecalciferol 1000 Unit(s) Oral daily  dextrose 5%. 1000 milliLiter(s) (50 mL/Hr) IV Continuous <Continuous>  dextrose 50% Injectable 12.5 Gram(s) IV Push once  dextrose 50% Injectable 25 Gram(s) IV Push once  dextrose 50% Injectable 25 Gram(s) IV Push once  enoxaparin Injectable 80 milliGRAM(s) SubCutaneous two times a day  insulin lispro (HumaLOG) corrective regimen sliding scale   SubCutaneous three times a day before meals  insulin lispro (HumaLOG) corrective regimen sliding scale   SubCutaneous at bedtime  warfarin 5 milliGRAM(s) Oral once    MEDICATIONS  (PRN):  dextrose Gel 1 Dose(s) Oral once PRN Blood Glucose LESS THAN 70 milliGRAM(s)/deciliter  glucagon  Injectable 1 milliGRAM(s) IntraMuscular once PRN Glucose LESS THAN 70 milligrams/deciliter    PAST MEDICAL & SURGICAL HISTORY:  Breast cancer  Lung cancer, left  Diabetes mellitus: diet controlled diabetes  Afib  S/P colectomy  Status post left hip replacement  S/P lobectomy of lung: s/p LLL Lobectomy 6/2014 for lung CA      FAMILY HISTORY:  Family history of lung cancer (Sibling)  Family history of secondary cancer of bone and bone marrow  Family history of dementia      SOCIAL HISTORY:  unchanged    REVIEW OF SYSTEMS:  CONSTITUTIONAL: No fever, weight loss, or fatigue  EYES: No eye pain, visual disturbances, or discharge  ENMT:  No difficulty hearing, tinnitus, vertigo; No sinus or throat pain  NECK: No pain or stiffness  RESPIRATORY: No cough, wheezing, chills or hemoptysis; No Shortness of Breath  CARDIOVASCULAR: No chest pain, palpitations, passing out, dizziness, LEFT leg swelling  GASTROINTESTINAL: No abdominal or epigastric pain. No nausea, vomiting, or hematemesis; No diarrhea or constipation. No melena or hematochezia .Abdominal fullness  GENITOURINARY: No dysuria, frequency, hematuria, or incontinence  NEUROLOGICAL: No headaches, memory loss, loss of strength, numbness, or tremors  SKIN: No itching, burning, rashes, or lesions   LYMPH Nodes: No enlarged glands  ENDOCRINE: No heat or cold intolerance; No hair loss  MUSCULOSKELETAL: No joint pain or swelling; No muscle, back, or extremity pain  PSYCHIATRIC: No depression, anxiety, mood swings, or difficulty sleeping  HEME/LYMPH: No easy bruising, or bleeding gums  ALLERY AND IMMUNOLOGIC: No hives or eczema	    [x ] All others negative	  [ ] Unable to obtain    ICU Vital Signs Last 24 Hrs  T(C): 36.3 (21 Jan 2018 05:20), Max: 36.6 (20 Jan 2018 13:19)  T(F): 97.4 (21 Jan 2018 05:20), Max: 97.9 (20 Jan 2018 13:19)  HR: 59 (21 Jan 2018 09:33) (59 - 74)  BP: 120/73 (21 Jan 2018 09:33) (120/73 - 171/81)  BP(mean): --  ABP: --  ABP(mean): --  RR: 18 (21 Jan 2018 05:20) (18 - 18)  SpO2: 95% (21 Jan 2018 09:33) (93% - 95%)        Appearance: Normal	  HEENT:   Normal oral mucosa, PERRL, EOMI	  Lymphatic: No lymphadenopathy  Cardiovascular: Normal S1 S2, No JVD.  Respiratory: Coarse breath sounds bilaterally.   Psychiatry: A & O x 3, Mood & affect appropriate  Gastrointestinal:  Soft, Non-tender, + BS	  Skin: No rashes, No ecchymoses, No cyanosis	  Neurologic: Non-focal  Extremities: LLE edema, tenderness and erythema.  RLE edema mild                            13.5   4.8   )-----------( 147      ( 20 Jan 2018 06:03 )             39.5   01-21    141  |  104  |  26<H>  ----------------------------<  147<H>  4.0   |  21<L>  |  1.00    Ca    9.8      21 Jan 2018 05:55  Phos  2.9     01-20  Mg     2.0     01-20    TPro  6.9  /  Alb  3.7  /  TBili  0.5  /  DBili  x   /  AST  11  /  ALT  9<L>  /  AlkPhos  64  01-20  PT/INR - ( 21 Jan 2018 05:55 )   PT: 11.8 sec;   INR: 1.08 ratio         PTT - ( 20 Jan 2018 06:03 )  PTT:48.7 sec
PAGER:  [phone]               [facility] [id number]              [email or web address]   OFFICE [phone]                              ********VASCULAR MEDICINE & CARDIOLOGY PROGRESS NOTE********                            INTERVAL HISTORY:  Feels better. Denies CP/SOB/PND or orthopnea.      MEDICATIONS:  aspirin enteric coated 81 milliGRAM(s) Oral daily  enoxaparin Injectable 80 milliGRAM(s) SubCutaneous two times a day  dextrose 50% Injectable 12.5 Gram(s) IV Push once  dextrose 50% Injectable 25 Gram(s) IV Push once  dextrose 50% Injectable 25 Gram(s) IV Push once  dextrose Gel 1 Dose(s) Oral once PRN  glucagon  Injectable 1 milliGRAM(s) IntraMuscular once PRN  insulin lispro (HumaLOG) corrective regimen sliding scale   SubCutaneous three times a day before meals  insulin lispro (HumaLOG) corrective regimen sliding scale   SubCutaneous at bedtime  cholecalciferol 1000 Unit(s) Oral daily  dextrose 5%. 1000 milliLiter(s) IV Continuous <Continuous>      REVIEW OF SYSTEMS:  CONSTITUTIONAL: No fever, weight loss, or fatigue  EYES: No eye pain, visual disturbances, or discharge  ENMT:  No difficulty hearing, tinnitus, vertigo; No sinus or throat pain  NECK: No pain or stiffness  RESPIRATORY: No cough, wheezing, chills or hemoptysis; No Shortness of Breath  CARDIOVASCULAR: No chest pain, palpitations, passing out, dizziness, or leg swelling  GASTROINTESTINAL: No abdominal or epigastric pain. No nausea, vomiting, or hematemesis; No diarrhea or constipation. No melena or hematochezia.  GENITOURINARY: No dysuria, frequency, hematuria, or incontinence  NEUROLOGICAL: No headaches, memory loss, loss of strength, numbness, or tremors  SKIN: No itching, burning, rashes, or lesions   LYMPH Nodes: No enlarged glands  ENDOCRINE: No heat or cold intolerance; No hair loss  PSYCHIATRIC: No depression, anxiety, mood swings, or difficulty sleeping  HEME/LYMPH: No easy bruising, or bleeding gums  ALLERY AND IMMUNOLOGIC: No hives or eczema	    [X] All others negative	  [ ] Unable to obtain    PHYSICAL EXAM:  T(C): 36.6 (01-22-18 @ 04:58), Max: 36.7 (01-21-18 @ 13:10)  HR: 60 (01-22-18 @ 04:58) (60 - 76)  BP: 125/72 (01-22-18 @ 04:58) (107/70 - 125/72)  RR: 18 (01-22-18 @ 04:58) (18 - 18)  SpO2: 95% (01-22-18 @ 04:58) (95% - 95%)  Wt(kg): --  I&O's Summary    21 Jan 2018 07:01  -  22 Jan 2018 07:00  --------------------------------------------------------  IN: 600 mL / OUT: 0 mL / NET: 600 mL    22 Jan 2018 07:01  -  22 Jan 2018 11:59  --------------------------------------------------------  IN: 120 mL / OUT: 0 mL / NET: 120 mL        Appearance: Normal	  HEENT:   Normal oral mucosa, PERRL, EOMI	  Lymphatic: No lymphadenopathy  Cardiovascular: Normal S1 S2, No JVD.  Respiratory: Coarse breath sounds bilaterally.   Psychiatry: A & O x 3, Mood & affect appropriate  Gastrointestinal:  Soft, Non-tender, + BS	  Skin: No rashes, No ecchymoses, No cyanosis	  Neurologic: Non-focal  Extremities: LLE edema, tenderness and erythema.  RLE edema mild    LABS:	 	    CBC Full  -  ( 22 Jan 2018 05:54 )  WBC Count : 5.6 K/uL  Hemoglobin : 13.1 g/dL  Hematocrit : 37.3 %  Platelet Count - Automated : 165 K/uL  Mean Cell Volume : 91.0 fl  Mean Cell Hemoglobin : 32.0 pg  Mean Cell Hemoglobin Concentration : 35.2 gm/dL  Auto Neutrophil # : x  Auto Lymphocyte # : x  Auto Monocyte # : x  Auto Eosinophil # : x  Auto Basophil # : x  Auto Neutrophil % : x  Auto Lymphocyte % : x  Auto Monocyte % : x  Auto Eosinophil % : x  Auto Basophil % : x    01-22    142  |  108  |  33<H>  ----------------------------<  107<H>  3.7   |  23  |  1.19  01-21    141  |  104  |  26<H>  ----------------------------<  147<H>  4.0   |  21<L>  |  1.00    Ca    9.5      22 Jan 2018 05:54  Ca    9.8      21 Jan 2018 05:55    CT C/A/P:  IMPRESSION: No evidence of metastatic disease in the chest, abdomen, and   pelvis. Emphysema.
Patient is a 82y old  Female who presents with a chief complaint of LE swelling (19 Jan 2018 20:03)      SUBJECTIVE / OVERNIGHT EVENTS:  Pt seen and examined at bedside. Feels well.   No chest pain, no shortness of breath.   No overnight event. Legs pain controlled.   No N/V/D. No abdominal pain. Constipated x 1 week but only wants Prune juice.         Vital Signs Last 24 Hrs  T(C): 36.6 (20 Jan 2018 13:19), Max: 36.8 (19 Jan 2018 21:47)  T(F): 97.9 (20 Jan 2018 13:19), Max: 98.2 (19 Jan 2018 21:47)  HR: 74 (20 Jan 2018 13:19) (59 - 74)  BP: 171/81 (20 Jan 2018 13:19) (132/84 - 171/81)  BP(mean): --  RR: 18 (20 Jan 2018 13:19) (15 - 19)  SpO2: 94% (20 Jan 2018 13:19) (94% - 97%)  I&O's Summary    20 Jan 2018 07:01  -  20 Jan 2018 16:06  --------------------------------------------------------  IN: 360 mL / OUT: 1 mL / NET: 359 mL        PHYSICAL EXAM:  GENERAL: NAD, Comfortable  HEAD:  Atraumatic, Normocephalic  EYES: EOMI, PERRLA, conjunctiva and sclera clear  NECK: Supple, No JVD  CHEST/LUNG: Clear to auscultation bilaterally; No wheeze  HEART: Regular rate and rhythm; No murmurs, rubs, or gallops  ABDOMEN: Soft, Nontender, Nondistended; Bowel sounds present  Neuro: AAO x 3, no focal deficit, 5/5 b/l extremities  EXTREMITIES:  +edema LE, no erythema.   SKIN: No rashes or lesions    LABS:                        13.5   4.8   )-----------( 147      ( 20 Jan 2018 06:03 )             39.5     01-20    143  |  108  |  23  ----------------------------<  115<H>  3.7   |  23  |  1.00    Ca    9.2      20 Jan 2018 06:03  Phos  2.9     01-20  Mg     2.0     01-20    TPro  6.9  /  Alb  3.7  /  TBili  0.5  /  DBili  x   /  AST  11  /  ALT  9<L>  /  AlkPhos  64  01-20    PT/INR - ( 20 Jan 2018 06:03 )   PT: 11.6 sec;   INR: 1.07 ratio         PTT - ( 20 Jan 2018 06:03 )  PTT:48.7 sec  CAPILLARY BLOOD GLUCOSE      POCT Blood Glucose.: 121 mg/dL (20 Jan 2018 13:15)  POCT Blood Glucose.: 99 mg/dL (20 Jan 2018 08:45)  POCT Blood Glucose.: 101 mg/dL (19 Jan 2018 23:07)            RADIOLOGY & ADDITIONAL TESTS:    Imaging Personally Reviewed:  [x] YES  [ ] NO    Consultant(s) Notes Reviewed:  [x] YES  [ ] NO      MEDICATIONS  (STANDING):  aspirin enteric coated 81 milliGRAM(s) Oral daily  cholecalciferol 1000 Unit(s) Oral daily  dextrose 5%. 1000 milliLiter(s) (50 mL/Hr) IV Continuous <Continuous>  dextrose 50% Injectable 12.5 Gram(s) IV Push once  dextrose 50% Injectable 25 Gram(s) IV Push once  dextrose 50% Injectable 25 Gram(s) IV Push once  enoxaparin Injectable 80 milliGRAM(s) SubCutaneous two times a day  insulin lispro (HumaLOG) corrective regimen sliding scale   SubCutaneous three times a day before meals  insulin lispro (HumaLOG) corrective regimen sliding scale   SubCutaneous at bedtime  warfarin 5 milliGRAM(s) Oral once    MEDICATIONS  (PRN):  dextrose Gel 1 Dose(s) Oral once PRN Blood Glucose LESS THAN 70 milliGRAM(s)/deciliter  glucagon  Injectable 1 milliGRAM(s) IntraMuscular once PRN Glucose LESS THAN 70 milligrams/deciliter      Care Discussed with Consultants/Other Providers [x] YES  [ ] NO    HEALTH ISSUES - PROBLEM Dx:  Prophylactic measure: Prophylactic measure  Malignant neoplasm of lung, unspecified laterality, unspecified part of lung: Malignant neoplasm of lung, unspecified laterality, unspecified part of lung  Type 2 diabetes mellitus without complication, without long-term current use of insulin: Type 2 diabetes mellitus without complication, without long-term current use of insulin  Acute deep vein thrombosis (DVT) of femoral vein of left lower extremity: Acute deep vein thrombosis (DVT) of femoral vein of left lower extremity
Patient is a 82y old  Female who presents with a chief complaint of LE swelling (19 Jan 2018 20:03)      SUBJECTIVE / OVERNIGHT EVENTS:  feeling well.  not willing to inject herself Lovenox.  no cp, no sob, no n/v/d. no abdominal pain.  no headache, no dizziness.   tele with no event.       Vital Signs Last 24 Hrs  T(C): 25.6 (22 Jan 2018 13:24), Max: 36.6 (22 Jan 2018 04:58)  T(F): 78 (22 Jan 2018 13:24), Max: 97.9 (22 Jan 2018 04:58)  HR: 78 (22 Jan 2018 13:24) (60 - 78)  BP: 125/67 (22 Jan 2018 13:24) (107/70 - 125/72)  BP(mean): --  RR: 18 (22 Jan 2018 13:24) (18 - 18)  SpO2: 98% (22 Jan 2018 13:24) (95% - 98%)  I&O's Summary    21 Jan 2018 07:01  -  22 Jan 2018 07:00  --------------------------------------------------------  IN: 600 mL / OUT: 0 mL / NET: 600 mL    22 Jan 2018 07:01  -  22 Jan 2018 17:27  --------------------------------------------------------  IN: 240 mL / OUT: 0 mL / NET: 240 mL        PHYSICAL EXAM:  GENERAL: NAD, Comfortable  HEAD:  Atraumatic, Normocephalic  EYES: EOMI, PERRLA, conjunctiva and sclera clear  NECK: Supple, No JVD  CHEST/LUNG: Clear to auscultation bilaterally; No wheeze  HEART: Regular rate and rhythm; No murmurs, rubs, or gallops  ABDOMEN: Soft, Nontender, Nondistended; Bowel sounds present  Neuro: AAO x 3, no focal deficit, 5/5 b/l extremities  EXTREMITIES:  2+ Peripheral Pulses, No clubbing, cyanosis, or edema  SKIN: No rashes or lesions    LABS:                        13.1   5.6   )-----------( 165      ( 22 Jan 2018 05:54 )             37.3     01-22    142  |  108  |  33<H>  ----------------------------<  107<H>  3.7   |  23  |  1.19    Ca    9.5      22 Jan 2018 05:54      PT/INR - ( 22 Jan 2018 05:54 )   PT: 14.5 sec;   INR: 1.33 ratio           CAPILLARY BLOOD GLUCOSE      POCT Blood Glucose.: 113 mg/dL (22 Jan 2018 12:11)  POCT Blood Glucose.: 115 mg/dL (22 Jan 2018 08:19)  POCT Blood Glucose.: 131 mg/dL (21 Jan 2018 21:44)  POCT Blood Glucose.: 132 mg/dL (21 Jan 2018 17:33)            RADIOLOGY & ADDITIONAL TESTS:    Imaging Personally Reviewed:  [x] YES  [ ] NO    Consultant(s) Notes Reviewed:  [x] YES  [ ] NO      MEDICATIONS  (STANDING):  aspirin enteric coated 81 milliGRAM(s) Oral daily  cholecalciferol 1000 Unit(s) Oral daily  dextrose 5%. 1000 milliLiter(s) (50 mL/Hr) IV Continuous <Continuous>  dextrose 50% Injectable 12.5 Gram(s) IV Push once  dextrose 50% Injectable 25 Gram(s) IV Push once  dextrose 50% Injectable 25 Gram(s) IV Push once  enoxaparin Injectable 80 milliGRAM(s) SubCutaneous two times a day  insulin lispro (HumaLOG) corrective regimen sliding scale   SubCutaneous three times a day before meals  insulin lispro (HumaLOG) corrective regimen sliding scale   SubCutaneous at bedtime  warfarin 5 milliGRAM(s) Oral once    MEDICATIONS  (PRN):  dextrose Gel 1 Dose(s) Oral once PRN Blood Glucose LESS THAN 70 milliGRAM(s)/deciliter  glucagon  Injectable 1 milliGRAM(s) IntraMuscular once PRN Glucose LESS THAN 70 milligrams/deciliter      Care Discussed with Consultants/Other Providers [x] YES  [ ] NO    HEALTH ISSUES - PROBLEM Dx:  Prophylactic measure: Prophylactic measure  Malignant neoplasm of lung, unspecified laterality, unspecified part of lung: Malignant neoplasm of lung, unspecified laterality, unspecified part of lung  Type 2 diabetes mellitus without complication, without long-term current use of insulin: Type 2 diabetes mellitus without complication, without long-term current use of insulin  Acute deep vein thrombosis (DVT) of femoral vein of left lower extremity: Acute deep vein thrombosis (DVT) of femoral vein of left lower extremity
PAGER:  [phone]               [facility] [id number]              [email or web address]   OFFICE [phone]                              ********VASCULAR MEDICINE & CARDIOLOGY PROGRESS NOTE********                            INTERVAL HISTORY:  Feels better. Denies CP/SOB/PND or orthopnea.    MEDICATIONS  (STANDING):  aspirin enteric coated 81 milliGRAM(s) Oral daily  cholecalciferol 1000 Unit(s) Oral daily  dextrose 5%. 1000 milliLiter(s) (50 mL/Hr) IV Continuous <Continuous>  dextrose 50% Injectable 12.5 Gram(s) IV Push once  dextrose 50% Injectable 25 Gram(s) IV Push once  dextrose 50% Injectable 25 Gram(s) IV Push once  enoxaparin Injectable 80 milliGRAM(s) SubCutaneous two times a day  insulin lispro (HumaLOG) corrective regimen sliding scale   SubCutaneous three times a day before meals  insulin lispro (HumaLOG) corrective regimen sliding scale   SubCutaneous at bedtime  warfarin 6 milliGRAM(s) Oral once    MEDICATIONS  (PRN):  dextrose Gel 1 Dose(s) Oral once PRN Blood Glucose LESS THAN 70 milliGRAM(s)/deciliter  glucagon  Injectable 1 milliGRAM(s) IntraMuscular once PRN Glucose LESS THAN 70 milligrams/deciliter      REVIEW OF SYSTEMS:  CONSTITUTIONAL: No fever, weight loss, or fatigue  EYES: No eye pain, visual disturbances, or discharge  ENMT:  No difficulty hearing, tinnitus, vertigo; No sinus or throat pain  NECK: No pain or stiffness  RESPIRATORY: No cough, wheezing, chills or hemoptysis; No Shortness of Breath  CARDIOVASCULAR: No chest pain, palpitations, passing out, dizziness, or leg swelling  GASTROINTESTINAL: No abdominal or epigastric pain. No nausea, vomiting, or hematemesis; No diarrhea or constipation. No melena or hematochezia.  GENITOURINARY: No dysuria, frequency, hematuria, or incontinence  NEUROLOGICAL: No headaches, memory loss, loss of strength, numbness, or tremors  SKIN: No itching, burning, rashes, or lesions   LYMPH Nodes: No enlarged glands  ENDOCRINE: No heat or cold intolerance; No hair loss  PSYCHIATRIC: No depression, anxiety, mood swings, or difficulty sleeping  HEME/LYMPH: No easy bruising, or bleeding gums  ALLERY AND IMMUNOLOGIC: No hives or eczema	    [X] All others negative	  [ ] Unable to obtain    ICU Vital Signs Last 24 Hrs  T(C): 36.9 (23 Jan 2018 12:19), Max: 36.9 (23 Jan 2018 12:19)  T(F): 98.4 (23 Jan 2018 12:19), Max: 98.4 (23 Jan 2018 12:19)  HR: 57 (23 Jan 2018 12:19) (57 - 80)  BP: 141/70 (23 Jan 2018 12:19) (123/65 - 141/70)  BP(mean): --  ABP: --  ABP(mean): --  RR: 18 (23 Jan 2018 12:19) (18 - 18)  SpO2: 96% (23 Jan 2018 12:19) (96% - 100%)      Appearance: Normal	  HEENT:   Normal oral mucosa, PERRL, EOMI	  Lymphatic: No lymphadenopathy  Cardiovascular: Normal S1 S2, No JVD.  Respiratory: Coarse breath sounds bilaterally.   Psychiatry: A & O x 3, Mood & affect appropriate  Gastrointestinal:  Soft, Non-tender, + BS	  Skin: No rashes, No ecchymoses, No cyanosis	  Neurologic: Non-focal  Extremities: LLE edema, tenderness and erythema.  RLE edema mild                            13.1   5.6   )-----------( 165      ( 22 Jan 2018 05:54 )             37.3   01-22    142  |  108  |  33<H>  ----------------------------<  107<H>  3.7   |  23  |  1.19    Ca    9.5      22 Jan 2018 05:54
PAGER:  [phone]               [facility] [id number]              [email or web address]   OFFICE [phone]                              ********VASCULAR MEDICINE & CARDIOLOGY PROGRESS NOTE********                            INTERVAL HISTORY:  Denies CP/SOB/PND or orthopnea.    MEDICATIONS  (STANDING):  aspirin enteric coated 81 milliGRAM(s) Oral daily  cholecalciferol 1000 Unit(s) Oral daily  dextrose 5%. 1000 milliLiter(s) (50 mL/Hr) IV Continuous <Continuous>  dextrose 50% Injectable 12.5 Gram(s) IV Push once  dextrose 50% Injectable 25 Gram(s) IV Push once  dextrose 50% Injectable 25 Gram(s) IV Push once  enoxaparin Injectable 80 milliGRAM(s) SubCutaneous two times a day  insulin lispro (HumaLOG) corrective regimen sliding scale   SubCutaneous three times a day before meals  insulin lispro (HumaLOG) corrective regimen sliding scale   SubCutaneous at bedtime  warfarin 6 milliGRAM(s) Oral once    MEDICATIONS  (PRN):  dextrose Gel 1 Dose(s) Oral once PRN Blood Glucose LESS THAN 70 milliGRAM(s)/deciliter  glucagon  Injectable 1 milliGRAM(s) IntraMuscular once PRN Glucose LESS THAN 70 milligrams/deciliter      REVIEW OF SYSTEMS:  CONSTITUTIONAL: No fever, weight loss, or fatigue  EYES: No eye pain, visual disturbances, or discharge  ENMT:  No difficulty hearing, tinnitus, vertigo; No sinus or throat pain  NECK: No pain or stiffness  RESPIRATORY: No cough, wheezing, chills or hemoptysis; No Shortness of Breath  CARDIOVASCULAR: No chest pain, palpitations, passing out, dizziness, or leg swelling  GASTROINTESTINAL: No abdominal or epigastric pain. No nausea, vomiting, or hematemesis; No diarrhea or constipation. No melena or hematochezia.  GENITOURINARY: No dysuria, frequency, hematuria, or incontinence  NEUROLOGICAL: No headaches, memory loss, loss of strength, numbness, or tremors  SKIN: No itching, burning, rashes, or lesions   LYMPH Nodes: No enlarged glands  ENDOCRINE: No heat or cold intolerance; No hair loss  PSYCHIATRIC: No depression, anxiety, mood swings, or difficulty sleeping  HEME/LYMPH: No easy bruising, or bleeding gums  ALLERY AND IMMUNOLOGIC: No hives or eczema	    [X] All others negative	  [ ] Unable to obtain  ICU Vital Signs Last 24 Hrs  T(C): 36.9 (24 Jan 2018 13:44), Max: 36.9 (24 Jan 2018 13:44)  T(F): 98.4 (24 Jan 2018 13:44), Max: 98.4 (24 Jan 2018 13:44)  HR: 77 (24 Jan 2018 13:44) (55 - 77)  BP: 112/71 (24 Jan 2018 13:44) (112/71 - 145/75)  BP(mean): --  ABP: --  ABP(mean): --  RR: 18 (24 Jan 2018 13:44) (18 - 18)  SpO2: 96% (24 Jan 2018 13:44) (95% - 96%)    Appearance: Normal	  HEENT:   Normal oral mucosa, PERRL, EOMI	  Lymphatic: No lymphadenopathy  Cardiovascular: Normal S1 S2, No JVD.  Respiratory: Coarse breath sounds bilaterally.   Psychiatry: A & O x 3, Mood & affect appropriate  Gastrointestinal:  Soft, Non-tender, + BS	  Skin: No rashes, No ecchymoses, No cyanosis	  Neurologic: Non-focal  Extremities: LLE edema, tenderness and erythema.  RLE edema mild                          13.8   4.7   )-----------( 135      ( 24 Jan 2018 06:12 )             39.1   01-24    143  |  109<H>  |  20  ----------------------------<  112<H>  3.9   |  23  |  0.96    Ca    9.3      24 Jan 2018 06:12    TPro  6.6  /  Alb  3.6  /  TBili  0.3  /  DBili  x   /  AST  41<H>  /  ALT  51<H>  /  AlkPhos  58  01-24
Patient is a 82y old  Female who presents with a chief complaint of LE swelling (19 Jan 2018 20:03)      SUBJECTIVE / OVERNIGHT EVENTS:  feels well.  Want to go home but doesn't want to inject Lovenox herself.  INR 1.55  no cp, no sob, no n/v/d. no abdominal pain.  no headache, no dizziness.       Vital Signs Last 24 Hrs  T(C): 36.3 (23 Jan 2018 05:22), Max: 36.7 (22 Jan 2018 21:01)  T(F): 97.4 (23 Jan 2018 05:22), Max: 98 (22 Jan 2018 21:01)  HR: 70 (23 Jan 2018 05:22) (70 - 80)  BP: 125/74 (23 Jan 2018 05:22) (123/65 - 125/74)  BP(mean): --  RR: 18 (23 Jan 2018 05:22) (18 - 18)  SpO2: 100% (23 Jan 2018 05:22) (98% - 100%)  I&O's Summary    22 Jan 2018 07:01  -  23 Jan 2018 07:00  --------------------------------------------------------  IN: 240 mL / OUT: 0 mL / NET: 240 mL    23 Jan 2018 07:01  -  23 Jan 2018 11:18  --------------------------------------------------------  IN: 120 mL / OUT: 0 mL / NET: 120 mL        PHYSICAL EXAM:  GENERAL: NAD, Comfortable  HEAD:  Atraumatic, Normocephalic  EYES: EOMI, PERRLA, conjunctiva and sclera clear  NECK: Supple, No JVD  CHEST/LUNG: Clear to auscultation bilaterally; No wheeze  HEART: Regular rate and rhythm; No murmurs, rubs, or gallops  ABDOMEN: Soft, Nontender, Nondistended; Bowel sounds present  Neuro: AAO x 3, no focal deficit, 5/5 b/l extremities  EXTREMITIES:  2+ Peripheral Pulses, No clubbing, cyanosis, or edema  SKIN: No rashes or lesions    LABS:                        13.1   5.6   )-----------( 165      ( 22 Jan 2018 05:54 )             37.3     01-22    142  |  108  |  33<H>  ----------------------------<  107<H>  3.7   |  23  |  1.19    Ca    9.5      22 Jan 2018 05:54      PT/INR - ( 23 Jan 2018 05:35 )   PT: 17.1 sec;   INR: 1.55 ratio           CAPILLARY BLOOD GLUCOSE      POCT Blood Glucose.: 96 mg/dL (23 Jan 2018 08:02)  POCT Blood Glucose.: 108 mg/dL (22 Jan 2018 21:43)  POCT Blood Glucose.: 87 mg/dL (22 Jan 2018 17:38)  POCT Blood Glucose.: 113 mg/dL (22 Jan 2018 12:11)            RADIOLOGY & ADDITIONAL TESTS:    Imaging Personally Reviewed:  [x] YES  [ ] NO    Consultant(s) Notes Reviewed:  [x] YES  [ ] NO      MEDICATIONS  (STANDING):  aspirin enteric coated 81 milliGRAM(s) Oral daily  cholecalciferol 1000 Unit(s) Oral daily  dextrose 5%. 1000 milliLiter(s) (50 mL/Hr) IV Continuous <Continuous>  dextrose 50% Injectable 12.5 Gram(s) IV Push once  dextrose 50% Injectable 25 Gram(s) IV Push once  dextrose 50% Injectable 25 Gram(s) IV Push once  enoxaparin Injectable 80 milliGRAM(s) SubCutaneous two times a day  insulin lispro (HumaLOG) corrective regimen sliding scale   SubCutaneous three times a day before meals  insulin lispro (HumaLOG) corrective regimen sliding scale   SubCutaneous at bedtime  warfarin 6 milliGRAM(s) Oral once    MEDICATIONS  (PRN):  dextrose Gel 1 Dose(s) Oral once PRN Blood Glucose LESS THAN 70 milliGRAM(s)/deciliter  glucagon  Injectable 1 milliGRAM(s) IntraMuscular once PRN Glucose LESS THAN 70 milligrams/deciliter      Care Discussed with Consultants/Other Providers [x] YES  [ ] NO    HEALTH ISSUES - PROBLEM Dx:  Prophylactic measure: Prophylactic measure  Malignant neoplasm of lung, unspecified laterality, unspecified part of lung: Malignant neoplasm of lung, unspecified laterality, unspecified part of lung  Type 2 diabetes mellitus without complication, without long-term current use of insulin: Type 2 diabetes mellitus without complication, without long-term current use of insulin  Acute deep vein thrombosis (DVT) of femoral vein of left lower extremity: Acute deep vein thrombosis (DVT) of femoral vein of left lower extremity
Patient is a 82y old  Female who presents with a chief complaint of LE swelling (19 Jan 2018 20:03)      SUBJECTIVE / OVERNIGHT EVENTS:  Feeling better today. Want to go home but INR still 1.77.  No overnight event.  No complaints.  Chest pain free. no SOB, no N/V/D.  Denied HA/dizziness, abdominal pain.       Vital Signs Last 24 Hrs  T(C): 36.4 (24 Jan 2018 05:22), Max: 36.9 (23 Jan 2018 12:19)  T(F): 97.6 (24 Jan 2018 05:22), Max: 98.4 (23 Jan 2018 12:19)  HR: 55 (24 Jan 2018 05:22) (55 - 57)  BP: 126/70 (24 Jan 2018 05:22) (126/70 - 145/75)  BP(mean): --  RR: 18 (24 Jan 2018 05:22) (18 - 18)  SpO2: 95% (24 Jan 2018 05:22) (95% - 96%)  I&O's Summary    23 Jan 2018 07:01  -  24 Jan 2018 07:00  --------------------------------------------------------  IN: 240 mL / OUT: 2 mL / NET: 238 mL    24 Jan 2018 07:01  -  24 Jan 2018 10:16  --------------------------------------------------------  IN: 220 mL / OUT: 0 mL / NET: 220 mL        PHYSICAL EXAM:  GENERAL: NAD, Comfortable  HEAD:  Atraumatic, Normocephalic  EYES: EOMI, PERRLA, conjunctiva and sclera clear  NECK: Supple, No JVD  CHEST/LUNG: Clear to auscultation bilaterally; No wheeze  HEART: Irregular rate and rhythm; No murmurs, rubs, or gallops  ABDOMEN: Soft, Nontender, Nondistended; Bowel sounds present  Neuro: AAO x 3, no focal deficit, 5/5 b/l extremities  EXTREMITIES:  2+ Peripheral Pulses, No clubbing, cyanosis, or edema  SKIN: No rashes or lesions    LABS:                        13.8   4.7   )-----------( 135      ( 24 Jan 2018 06:12 )             39.1     01-24    143  |  109<H>  |  20  ----------------------------<  112<H>  3.9   |  23  |  0.96    Ca    9.3      24 Jan 2018 06:12    TPro  6.6  /  Alb  3.6  /  TBili  0.3  /  DBili  x   /  AST  41<H>  /  ALT  51<H>  /  AlkPhos  58  01-24    PT/INR - ( 24 Jan 2018 06:12 )   PT: 19.3 sec;   INR: 1.77 ratio           CAPILLARY BLOOD GLUCOSE      POCT Blood Glucose.: 111 mg/dL (24 Jan 2018 08:33)  POCT Blood Glucose.: 161 mg/dL (23 Jan 2018 21:33)  POCT Blood Glucose.: 115 mg/dL (23 Jan 2018 17:12)  POCT Blood Glucose.: 96 mg/dL (23 Jan 2018 12:17)            RADIOLOGY & ADDITIONAL TESTS:    Imaging Personally Reviewed:  [x] YES  [ ] NO    Consultant(s) Notes Reviewed:  [x] YES  [ ] NO      MEDICATIONS  (STANDING):  aspirin enteric coated 81 milliGRAM(s) Oral daily  cholecalciferol 1000 Unit(s) Oral daily  dextrose 5%. 1000 milliLiter(s) (50 mL/Hr) IV Continuous <Continuous>  dextrose 50% Injectable 12.5 Gram(s) IV Push once  dextrose 50% Injectable 25 Gram(s) IV Push once  dextrose 50% Injectable 25 Gram(s) IV Push once  enoxaparin Injectable 80 milliGRAM(s) SubCutaneous two times a day  insulin lispro (HumaLOG) corrective regimen sliding scale   SubCutaneous three times a day before meals  insulin lispro (HumaLOG) corrective regimen sliding scale   SubCutaneous at bedtime    MEDICATIONS  (PRN):  dextrose Gel 1 Dose(s) Oral once PRN Blood Glucose LESS THAN 70 milliGRAM(s)/deciliter  glucagon  Injectable 1 milliGRAM(s) IntraMuscular once PRN Glucose LESS THAN 70 milligrams/deciliter      Care Discussed with Consultants/Other Providers [x] YES  [ ] NO    HEALTH ISSUES - PROBLEM Dx:  Prophylactic measure: Prophylactic measure  Malignant neoplasm of lung, unspecified laterality, unspecified part of lung: Malignant neoplasm of lung, unspecified laterality, unspecified part of lung  Type 2 diabetes mellitus without complication, without long-term current use of insulin: Type 2 diabetes mellitus without complication, without long-term current use of insulin  Acute deep vein thrombosis (DVT) of femoral vein of left lower extremity: Acute deep vein thrombosis (DVT) of femoral vein of left lower extremity

## 2018-01-25 NOTE — DISCHARGE NOTE ADULT - CARE PLAN
Principal Discharge DX:	Acute deep vein thrombosis (DVT) of femoral vein of left lower extremity  Goal:	On Coumadin  Assessment and plan of treatment:	Take Coumadin as directed   CHECK INR on MONDAY (1/29/18)  Secondary Diagnosis:	Type 2 diabetes mellitus without complication, without long-term current use of insulin  Assessment and plan of treatment:	HgA1C this admission.  Make sure you get your HgA1c checked every three months.  If you take oral diabetes medications, check your blood glucose two times a day.  If you take insulin, check your blood glucose before meals and at bedtime.  It's important not to skip any meals.  Keep a log of your blood glucose results and always take it with you to your doctor appointments.  Keep a list of your current medications including injectables and over the counter medications and bring this medication list with you to all your doctor appointments.  If you have not seen your ophthalmologist this year call for appointment.  Check your feet daily for redness, sores, or openings. Do not self treat. If no improvement in two days call your primary care physician for an appointment.  Low blood sugar (hypoglycemia) is a blood sugar below 70mg/dl. Check your blood sugar if you feel signs/symptoms of hypoglycemia. If your blood sugar is below 70 take 15 grams of carbohydrates (ex 4 oz of apple juice, 3-4 glucose tablets, or 4-6 oz of regular soda) wait 15 minutes and repeat blood sugar to make sure it comes up above 70.  If your blood sugar is above 70 and you are due for a meal, have a meal.  If you are not due for a meal have a snack.  This snack helps keeps your blood sugar at a safe range.  Secondary Diagnosis:	Afib  Assessment and plan of treatment:	Atrial fibrillation is the most common heart rhythm problem.  The condition puts you at risk for has stroke and heart attack  It helps if you control your blood pressure, not drink more than 1-2 alcohol drinks per day, cut down on caffeine, getting treatment for over active thyroid gland, and get regular exercise  Call your doctor if you feel your heart racing or beating unusually, chest tightness or pain, lightheaded, faint, shortness of breath especially with exercise  It is important to take your heart medication as prescribed  You may be on anticoagulation which is very important to take as directed - you may need blood work to monitor drug levels

## 2018-01-25 NOTE — PROGRESS NOTE ADULT - PROVIDER SPECIALTY LIST ADULT
Cardiology
Internal Medicine
Internal Medicine
Cardiology
Cardiology
Internal Medicine

## 2018-01-25 NOTE — DISCHARGE NOTE ADULT - ADDITIONAL INSTRUCTIONS
Follow-up with your Primary Care Doctor on Monday 1/29/18 for INR check  Follow-up with Cardiology in one week Follow-up with your Primary Care Doctor on Monday 1/29/18 for INR check or Cardiologist  Follow-up with Cardiology in one week

## 2018-01-25 NOTE — DISCHARGE NOTE ADULT - CARE PROVIDERS DIRECT ADDRESSES
[email or web address]

## 2018-01-25 NOTE — DISCHARGE NOTE ADULT - PATIENT PORTAL LINK FT
“You can access the [employer] Patient Portal, offered by [facility], by registering with the following website: [email or web address]”

## 2018-01-25 NOTE — PROGRESS NOTE ADULT - ASSESSMENT
Assessment    1.	Acute above the knee DVT (L common femoral, femoral and popliteal)  2.	Hx of R leg DVT previously on Coumadin  3.	Hx of lung cancer s/p lobectomy  4.	Hx of malignant melanoma  5.	Hx of breast CA s/p lumpectomy  6.	Paroxysmal atrial fibrillation    Plan    1.	No evidence suggestive of malignancy on CT scan.  2.	Continue Lovenox 1mg/kg BID and Coumadin bridge. INR goal 2-3.  hopefully can d/c home tomorrow.    3.	Tumor markers thus far negative.    4.	Patient will need outpatient follow-up with her thoracic oncologist at [facility] (Dr. Canales).            Andrzej Stroud  [id number]
82 F PMH Afib, lung cancer s/p R lobectomy, malignant melanoma, breast CA s/p lumpectomy (cancer free since 2015), DM, R DVT previously on coumadin p/w LLE swelling.
82 F PMH Afib, lung cancer s/p R lobectomy, malignant melanoma, breast CA s/p lumpectomy (cancer free since 2015), DM, R DVT previously on coumadin p/w LLE swelling x 3 weeks.    Plan:  1.  Continue with anticoagulation with lovenox 1mg/kg BID and coumadin dosing  2.  Goal INR is 2-3  3.  Followup results of CT scan  4.  Send stool Guaiac        Thanks      Andrzej Stroud  Vascular Medicine and Cardiology  [id number]
82 F PMH Afib, lung cancer s/p R lobectomy, malignant melanoma, breast CA s/p lumpectomy (cancer free since 2015), DM, R DVT previously on coumadin p/w LLE swelling.
82 F PMH Afib, lung cancer s/p R lobectomy, malignant melanoma, breast CA s/p lumpectomy (cancer free since 2015), DM, R DVT previously on coumadin p/w LLE swelling.
Assessment    1.	Acute above the knee DVT (L common femoral, femoral and popliteal)  2.	Hx of R leg DVT previously on Coumadin  3.	Hx of lung cancer s/p lobectomy  4.	Hx of malignant melanoma  5.	Hx of breast CA s/p lumpectomy  6.	Paroxysmal atrial fibrillation    Plan    1.	No evidence suggestive of malignancy on CT scan.  2.	Can stop lovenox after today and continue coumadin goal INR 2-3.  She will see Dr. Shanks tomorrow in followup.    3.	Tumor markers thus far negative.    4.	Patient will need outpatient follow-up with her thoracic oncologist at [facility] (Dr. Canales).  5.  D/C planning today          Andrzej Stroud  [id number]
Assessment    1.	Acute above the knee DVT (L common femoral, femoral and popliteal)  2.	Hx of R leg DVT previously on Coumadin  3.	Hx of lung cancer s/p lobectomy  4.	Hx of malignant melanoma  5.	Hx of breast CA s/p lumpectomy  6.	Paroxysmal atrial fibrillation    Plan    1.	No evidence suggestive of malignancy on CT scan.  2.	Continue Lovenox 1mg/kg BID and Coumadin bridge. INR goal 2-3.  3.	While controversial, recommend obtaining tumor marker levels (CEA, CA-125, CA 19-9, CA 27.29).  4.	Patient will need outpatient follow-up with her thoracic oncologist at [facility] (Dr. Canales).      Plan of care explained at length to patient and her daughter over the phone.    Du Mckeon MD  [phone]
Assessment    1.	Acute above the knee DVT (L common femoral, femoral and popliteal)  2.	Hx of R leg DVT previously on Coumadin  3.	Hx of lung cancer s/p lobectomy  4.	Hx of malignant melanoma  5.	Hx of breast CA s/p lumpectomy  6.	Paroxysmal atrial fibrillation    Plan    1.	No evidence suggestive of malignancy on CT scan.  2.	Continue Lovenox 1mg/kg BID and Coumadin bridge. INR goal 2-3.  3.	Pending all tumor markers  4.	Patient will need outpatient follow-up with her thoracic oncologist at [facility] (Dr. Canales).      Plan of care explained at length to patient and her daughter over the phone.        Andrzej Stroud  [id number]
82 F PMH Afib, lung cancer s/p R lobectomy, malignant melanoma, breast CA s/p lumpectomy (cancer free since 2015), DM, R DVT previously on coumadin p/w LLE swelling.
82 F PMH Afib, lung cancer s/p R lobectomy, malignant melanoma, breast CA s/p lumpectomy (cancer free since 2015), DM, R DVT previously on coumadin p/w LLE swelling.

## 2018-01-25 NOTE — DISCHARGE NOTE ADULT - CARE PROVIDER_API CALL
Adi Shanks (MD), Cardiovascular Disease; Endovascular Medicine; Internal Medicine; Interventional Cardiology; Vascular Medicine  [address] [place] [address]  Phone: [phone]  Fax: [phone] Adi Shanks), Cardiovascular Disease; Endovascular Medicine; Internal Medicine; Interventional Cardiology; Vascular Medicine  [address] [place] [address]  Phone: [phone]  Fax: [phone]    Sal Canchola), Internal Medicine  [address] [place] [id number]  Phone: [phone]  Fax: [phone]

## 2018-01-26 ENCOUNTER — APPOINTMENT (OUTPATIENT)
Dept: CARDIOLOGY | Facility: CLINIC | Age: 83
End: 2018-01-26
Payer: MEDICARE

## 2018-01-26 VITALS
HEART RATE: 76 BPM | HEIGHT: 64 IN | OXYGEN SATURATION: 96 % | DIASTOLIC BLOOD PRESSURE: 76 MMHG | SYSTOLIC BLOOD PRESSURE: 142 MMHG | WEIGHT: 167 LBS | BODY MASS INDEX: 28.51 KG/M2

## 2018-01-26 PROCEDURE — 99214 OFFICE O/P EST MOD 30 MIN: CPT

## 2018-01-30 ENCOUNTER — TRANSCRIPTION ENCOUNTER (OUTPATIENT)
Age: 83
End: 2018-01-30

## 2018-02-03 ENCOUNTER — INPATIENT (INPATIENT)
Facility: HOSPITAL | Age: 83
LOS: 16 days | Discharge: HOME | DRG: 872 | End: 2018-02-20
Attending: INTERNAL MEDICINE | Admitting: INTERNAL MEDICINE
Payer: MEDICARE

## 2018-02-03 VITALS
SYSTOLIC BLOOD PRESSURE: 148 MMHG | RESPIRATION RATE: 18 BRPM | DIASTOLIC BLOOD PRESSURE: 79 MMHG | HEART RATE: 85 BPM | OXYGEN SATURATION: 94 %

## 2018-02-03 DIAGNOSIS — Z96.60 PRESENCE OF UNSPECIFIED ORTHOPEDIC JOINT IMPLANT: Chronic | ICD-10-CM

## 2018-02-03 DIAGNOSIS — Z98.89 OTHER SPECIFIED POSTPROCEDURAL STATES: Chronic | ICD-10-CM

## 2018-02-03 LAB
BASOPHILS # BLD AUTO: 0 K/UL — SIGNIFICANT CHANGE UP (ref 0–0.2)
BASOPHILS NFR BLD AUTO: 0.4 % — SIGNIFICANT CHANGE UP (ref 0–2)
EOSINOPHIL # BLD AUTO: 0 K/UL — SIGNIFICANT CHANGE UP (ref 0–0.5)
EOSINOPHIL NFR BLD AUTO: 0 % — SIGNIFICANT CHANGE UP (ref 0–6)
HCT VFR BLD CALC: 42 % — SIGNIFICANT CHANGE UP (ref 34.5–45)
HGB BLD-MCNC: 14.4 G/DL — SIGNIFICANT CHANGE UP (ref 11.5–15.5)
LYMPHOCYTES # BLD AUTO: 1.4 K/UL — SIGNIFICANT CHANGE UP (ref 1–3.3)
LYMPHOCYTES # BLD AUTO: 11 % — LOW (ref 13–44)
MCHC RBC-ENTMCNC: 31.4 PG — SIGNIFICANT CHANGE UP (ref 27–34)
MCHC RBC-ENTMCNC: 34.4 GM/DL — SIGNIFICANT CHANGE UP (ref 32–36)
MCV RBC AUTO: 91.3 FL — SIGNIFICANT CHANGE UP (ref 80–100)
MONOCYTES # BLD AUTO: 1.1 K/UL — HIGH (ref 0–0.9)
MONOCYTES NFR BLD AUTO: 8.9 % — SIGNIFICANT CHANGE UP (ref 2–14)
NEUTROPHILS # BLD AUTO: 10 K/UL — HIGH (ref 1.8–7.4)
NEUTROPHILS NFR BLD AUTO: 79.8 % — HIGH (ref 43–77)
PLATELET # BLD AUTO: 179 K/UL — SIGNIFICANT CHANGE UP (ref 150–400)
RBC # BLD: 4.6 M/UL — SIGNIFICANT CHANGE UP (ref 3.8–5.2)
RBC # FLD: 13.6 % — SIGNIFICANT CHANGE UP (ref 10.3–14.5)
WBC # BLD: 12.5 K/UL — HIGH (ref 3.8–10.5)
WBC # FLD AUTO: 12.5 K/UL — HIGH (ref 3.8–10.5)

## 2018-02-03 PROCEDURE — 93010 ELECTROCARDIOGRAM REPORT: CPT

## 2018-02-03 PROCEDURE — 71045 X-RAY EXAM CHEST 1 VIEW: CPT | Mod: 26

## 2018-02-03 PROCEDURE — 99285 EMERGENCY DEPT VISIT HI MDM: CPT | Mod: 25,GC

## 2018-02-03 RX ORDER — SODIUM CHLORIDE 9 MG/ML
1000 INJECTION INTRAMUSCULAR; INTRAVENOUS; SUBCUTANEOUS ONCE
Qty: 0 | Refills: 0 | Status: COMPLETED | OUTPATIENT
Start: 2018-02-03 | End: 2018-02-03

## 2018-02-03 RX ADMIN — SODIUM CHLORIDE 1000 MILLILITER(S): 9 INJECTION INTRAMUSCULAR; INTRAVENOUS; SUBCUTANEOUS at 23:35

## 2018-02-03 NOTE — ED ADULT TRIAGE NOTE - CHIEF COMPLAINT QUOTE
brought in by ems for syncopal episode at home, one episode of vomiting during transport to the hospital

## 2018-02-03 NOTE — ED PROVIDER NOTE - ATTENDING CONTRIBUTION TO CARE
83 yo F presents s/p syncopal episode. she was sitting on the edge of her bed and then she was found on the floor, does not know how she got there. daughter picked her up and felt her go limp twice more. patient had no prodromal symptoms, does not recall anything that happened. she denies hitting her head. patient gets physical therapy and today the therapist noticed that nain's left leg was weak, unknown time of onset. patient is on coumadin and plavix. patient has no complaints at this time. she has not eaten much today. she vomited once in the ambulance and had a bowel movement on arrival to the ER. she denies f/ch, uri symptoms, cp, sob, cough, abd  pain, urinary complaints.   PE pale, ill appearing, lungs CTA. abd soft NT, sterngth apepars grossly intact 5/5 in all 4 extremities, neuro exam normal. 83 yo F presents s/p syncopal episode. she was sitting on the edge of her bed and then she was found on the floor, does not know how she got there. daughter picked her up and felt her go limp twice more. patient had no prodromal symptoms, does not recall anything that happened. she denies hitting her head. patient gets physical therapy and today the therapist noticed that nain's left leg was weak, unknown time of onset. patient is on coumadin and plavix. patient has no complaints at this time. she has not eaten much today. she vomited once in the ambulance and had a bowel movement on arrival to the ER. she denies f/ch, uri symptoms, cp, sob, cough, abd  pain, urinary complaints.   [facility]  [facility] pale, ill appearing, lungs CTA. abd soft NT, sterngth apepars grossly intact 5/5 in all 4 extremities, neuro exam normal. 81 yo F presents s/p syncopal episode. she was sitting on the edge of her bed and then she was found on the floor, does not know how she got there. daughter picked her up and felt her go limp twice more. patient had no prodromal symptoms, does not recall anything that happened. she denies hitting her head. patient gets physical therapy and today the therapist noticed that nain's left leg was weak, unknown time of onset. patient is on coumadin and plavix. patient has no complaints at this time. she has not eaten much today. she vomited once in the ambulance and had a bowel movement on arrival to the ER. she denies f/ch, uri symptoms, cp, sob, cough, abd  pain, urinary complaints.   [facility]  PE pale, ill appearing, lungs CTA. abd soft NT, strength appears grossly intact 5/5 in all 4 extremities, neuro exam normal.  rectal temp revealed fever in the ER.   ed workup shows cxr with no evidence of pna. cth with nad. ua with some evidence of infection, moderate leuks and 11-25 wbc. mild leukocytosis 12.5. inr therapeutic 2.45. labs otherwise normal.   patient was treated with ivfs and antipyretics. she was given empriic abx with vanco and zosyn for broad spectrum coverage. pt was admitted in stable condition    Based on patient's HPI as well as the results of today's workup, I felt that patient warranted admission to the hospital for further workup/evaluation and continued management. I discussed the findings of today's workup with the patient and addressed the patient's questions and concerns. The patient was agreeable with admission. I spoke with the hospitalist who accepted the patient for admission and subsequently took over the patient's care.

## 2018-02-03 NOTE — ED PROVIDER NOTE - SECONDARY DIAGNOSIS.
Hypoxia Syncope, unspecified syncope type Urinary tract infection without hematuria, site unspecified Fever, unspecified fever cause

## 2018-02-03 NOTE — ED PROVIDER NOTE - MEDICAL DECISION MAKING DETAILS
pt febrile 102.8 with uti & pre-syncope.  ct head shows no acute pathology. cxr shows L sided pleural effusion.   -plan: labs, EKG, UA, CXR, symptomatic treatment w/ antipyretics, IVF - CTX-reassess, -admit pt febrile 102.8 with uti & syncope.  ct head shows no acute pathology. cxr shows L sided pleural effusion.   -plan: labs, EKG, UA, CXR, symptomatic treatment w/ antipyretics, IVF - CTX-reassess, -admit

## 2018-02-03 NOTE — ED PROVIDER NOTE - CHIEF COMPLAINT
The patient is a 82y Female complaining of syncope. The patient is a 82y Female complaining of syncope

## 2018-02-03 NOTE — ED PROVIDER NOTE - CARE PLAN
Principal Discharge DX:	Urinary tract infection without hematuria, site unspecified  Secondary Diagnosis:	Syncope, unspecified syncope type  Secondary Diagnosis:	Hypoxia Principal Discharge DX:	Syncope, unspecified syncope type  Secondary Diagnosis:	Urinary tract infection without hematuria, site unspecified  Secondary Diagnosis:	Fever, unspecified fever cause

## 2018-02-03 NOTE — ED PROVIDER NOTE - OBJECTIVE STATEMENT
83yo F PMH lung CA s/p LLL lobectomy, COPD, malignant melanoma, breast CA, b/l LE DVT on coumadin bibems after witnessed syncopal episode x2, new & gradual onset.  daughter noted pt to have eye closed while sitting, minimally responsive, closing mouth tightly, foaming at mouth for about 15minutes, then returned to baseline. when ems came similar episode occurred & pt had 1 episode nb vomit.  ROS +  fever, chills, increased urination.  ROS negative for: chest pain, SOB, current Nausea, diarrhea, abdominal pain, dysuria

## 2018-02-04 DIAGNOSIS — N39.0 URINARY TRACT INFECTION, SITE NOT SPECIFIED: ICD-10-CM

## 2018-02-04 DIAGNOSIS — E11.9 TYPE 2 DIABETES MELLITUS WITHOUT COMPLICATIONS: ICD-10-CM

## 2018-02-04 DIAGNOSIS — R55 SYNCOPE AND COLLAPSE: ICD-10-CM

## 2018-02-04 DIAGNOSIS — I82.409 ACUTE EMBOLISM AND THROMBOSIS OF UNSPECIFIED DEEP VEINS OF UNSPECIFIED LOWER EXTREMITY: ICD-10-CM

## 2018-02-04 LAB
ALBUMIN SERPL ELPH-MCNC: 3.6 G/DL — SIGNIFICANT CHANGE UP (ref 3.3–5)
ALP SERPL-CCNC: 66 U/L — SIGNIFICANT CHANGE UP (ref 40–120)
ALT FLD-CCNC: 22 U/L RC — SIGNIFICANT CHANGE UP (ref 10–45)
ANION GAP SERPL CALC-SCNC: 13 MMOL/L — SIGNIFICANT CHANGE UP (ref 5–17)
ANION GAP SERPL CALC-SCNC: 17 MMOL/L — SIGNIFICANT CHANGE UP (ref 5–17)
ANISOCYTOSIS BLD QL: SLIGHT — SIGNIFICANT CHANGE UP
APPEARANCE UR: CLEAR — SIGNIFICANT CHANGE UP
APTT BLD: 47.6 SEC — HIGH (ref 27.5–37.4)
APTT BLD: 85.9 SEC — HIGH (ref 27.5–37.4)
AST SERPL-CCNC: 30 U/L — SIGNIFICANT CHANGE UP (ref 10–40)
BACTERIA # UR AUTO: ABNORMAL /HPF
BASOPHILS # BLD AUTO: 0.01 K/UL — SIGNIFICANT CHANGE UP (ref 0–0.2)
BASOPHILS NFR BLD AUTO: 0.1 % — SIGNIFICANT CHANGE UP (ref 0–2)
BILIRUB SERPL-MCNC: 0.9 MG/DL — SIGNIFICANT CHANGE UP (ref 0.2–1.2)
BILIRUB UR-MCNC: NEGATIVE — SIGNIFICANT CHANGE UP
BUN SERPL-MCNC: 22 MG/DL — SIGNIFICANT CHANGE UP (ref 7–23)
BUN SERPL-MCNC: 22 MG/DL — SIGNIFICANT CHANGE UP (ref 7–23)
BURR CELLS BLD QL SMEAR: SIGNIFICANT CHANGE UP
CALCIUM SERPL-MCNC: 8.3 MG/DL — LOW (ref 8.4–10.5)
CALCIUM SERPL-MCNC: 9.3 MG/DL — SIGNIFICANT CHANGE UP (ref 8.4–10.5)
CHLORIDE SERPL-SCNC: 106 MMOL/L — SIGNIFICANT CHANGE UP (ref 96–108)
CHLORIDE SERPL-SCNC: 99 MMOL/L — SIGNIFICANT CHANGE UP (ref 96–108)
CO2 SERPL-SCNC: 21 MMOL/L — LOW (ref 22–31)
CO2 SERPL-SCNC: 22 MMOL/L — SIGNIFICANT CHANGE UP (ref 22–31)
COLOR SPEC: YELLOW — SIGNIFICANT CHANGE UP
COMMENT - URINE: SIGNIFICANT CHANGE UP
CREAT SERPL-MCNC: 1.18 MG/DL — SIGNIFICANT CHANGE UP (ref 0.5–1.3)
CREAT SERPL-MCNC: 1.2 MG/DL — SIGNIFICANT CHANGE UP (ref 0.5–1.3)
DIFF PNL FLD: NEGATIVE — SIGNIFICANT CHANGE UP
ELLIPTOCYTES BLD QL SMEAR: SLIGHT — SIGNIFICANT CHANGE UP
EOSINOPHIL # BLD AUTO: 0 K/UL — SIGNIFICANT CHANGE UP (ref 0–0.5)
EOSINOPHIL NFR BLD AUTO: 0 % — SIGNIFICANT CHANGE UP (ref 0–6)
ESTIMATED AVERAGE GLUCOSE: 128 MG/DL — HIGH (ref 68–114)
GAS PNL BLDA: SIGNIFICANT CHANGE UP
GLUCOSE BLDC GLUCOMTR-MCNC: 134 MG/DL — HIGH (ref 70–99)
GLUCOSE BLDC GLUCOMTR-MCNC: 146 MG/DL — HIGH (ref 70–99)
GLUCOSE BLDC GLUCOMTR-MCNC: 158 MG/DL — HIGH (ref 70–99)
GLUCOSE BLDC GLUCOMTR-MCNC: 183 MG/DL — HIGH (ref 70–99)
GLUCOSE SERPL-MCNC: 160 MG/DL — HIGH (ref 70–99)
GLUCOSE SERPL-MCNC: 187 MG/DL — HIGH (ref 70–99)
GLUCOSE UR QL: 50 MG/DL
HBA1C BLD-MCNC: 6.1 % — HIGH (ref 4–5.6)
HCT VFR BLD CALC: 36.6 % — SIGNIFICANT CHANGE UP (ref 34.5–45)
HCT VFR BLD CALC: 37.1 % — SIGNIFICANT CHANGE UP (ref 34.5–45)
HGB BLD-MCNC: 12 G/DL — SIGNIFICANT CHANGE UP (ref 11.5–15.5)
HGB BLD-MCNC: 12 G/DL — SIGNIFICANT CHANGE UP (ref 11.5–15.5)
IMM GRANULOCYTES NFR BLD AUTO: 0.1 % — SIGNIFICANT CHANGE UP (ref 0–1.5)
INR BLD: 2.36 RATIO — HIGH (ref 0.88–1.16)
INR BLD: 2.45 RATIO — HIGH (ref 0.88–1.16)
KETONES UR-MCNC: ABNORMAL
LEUKOCYTE ESTERASE UR-ACNC: ABNORMAL
LYMPHOCYTES # BLD AUTO: 0.8 K/UL — LOW (ref 1–3.3)
LYMPHOCYTES # BLD AUTO: 6.9 % — LOW (ref 13–44)
MAGNESIUM SERPL-MCNC: 1.8 MG/DL — SIGNIFICANT CHANGE UP (ref 1.6–2.6)
MANUAL SMEAR VERIFICATION: SIGNIFICANT CHANGE UP
MCHC RBC-ENTMCNC: 29.2 PG — SIGNIFICANT CHANGE UP (ref 27–34)
MCHC RBC-ENTMCNC: 30.4 PG — SIGNIFICANT CHANGE UP (ref 27–34)
MCHC RBC-ENTMCNC: 32.3 GM/DL — SIGNIFICANT CHANGE UP (ref 32–36)
MCHC RBC-ENTMCNC: 32.8 GM/DL — SIGNIFICANT CHANGE UP (ref 32–36)
MCV RBC AUTO: 90.3 FL — SIGNIFICANT CHANGE UP (ref 80–100)
MCV RBC AUTO: 92.6 FL — SIGNIFICANT CHANGE UP (ref 80–100)
MONOCYTES # BLD AUTO: 1.22 K/UL — HIGH (ref 0–0.9)
MONOCYTES NFR BLD AUTO: 10.5 % — SIGNIFICANT CHANGE UP (ref 2–14)
NEUTROPHILS # BLD AUTO: 9.62 K/UL — HIGH (ref 1.8–7.4)
NEUTROPHILS NFR BLD AUTO: 82.4 % — HIGH (ref 43–77)
NITRITE UR-MCNC: NEGATIVE — SIGNIFICANT CHANGE UP
PH UR: 7 — SIGNIFICANT CHANGE UP (ref 5–8)
PLAT MORPH BLD: NORMAL — SIGNIFICANT CHANGE UP
PLATELET # BLD AUTO: 134 K/UL — LOW (ref 150–400)
PLATELET # BLD AUTO: 176 K/UL — SIGNIFICANT CHANGE UP (ref 150–400)
POIKILOCYTOSIS BLD QL AUTO: SLIGHT — SIGNIFICANT CHANGE UP
POTASSIUM SERPL-MCNC: 3.1 MMOL/L — LOW (ref 3.5–5.3)
POTASSIUM SERPL-MCNC: 4.6 MMOL/L — SIGNIFICANT CHANGE UP (ref 3.5–5.3)
POTASSIUM SERPL-SCNC: 3.1 MMOL/L — LOW (ref 3.5–5.3)
POTASSIUM SERPL-SCNC: 4.6 MMOL/L — SIGNIFICANT CHANGE UP (ref 3.5–5.3)
PROT SERPL-MCNC: 7.6 G/DL — SIGNIFICANT CHANGE UP (ref 6–8.3)
PROT UR-MCNC: 30 MG/DL
PROTHROM AB SERPL-ACNC: 25.9 SEC — HIGH (ref 9.8–12.7)
PROTHROM AB SERPL-ACNC: 28.2 SEC — HIGH (ref 10–13.1)
RAPID RVP RESULT: SIGNIFICANT CHANGE UP
RBC # BLD: 3.95 M/UL — SIGNIFICANT CHANGE UP (ref 3.8–5.2)
RBC # BLD: 4.11 M/UL — SIGNIFICANT CHANGE UP (ref 3.8–5.2)
RBC # FLD: 13.6 % — SIGNIFICANT CHANGE UP (ref 10.3–14.5)
RBC # FLD: 15.2 % — HIGH (ref 10.3–14.5)
RBC BLD AUTO: ABNORMAL
RBC CASTS # UR COMP ASSIST: SIGNIFICANT CHANGE UP /HPF (ref 0–2)
SODIUM SERPL-SCNC: 138 MMOL/L — SIGNIFICANT CHANGE UP (ref 135–145)
SODIUM SERPL-SCNC: 140 MMOL/L — SIGNIFICANT CHANGE UP (ref 135–145)
SP GR SPEC: 1.02 — SIGNIFICANT CHANGE UP (ref 1.01–1.02)
TROPONIN T SERPL-MCNC: <0.01 NG/ML — SIGNIFICANT CHANGE UP (ref 0–0.06)
UROBILINOGEN FLD QL: NEGATIVE — SIGNIFICANT CHANGE UP
WBC # BLD: 10 K/UL — SIGNIFICANT CHANGE UP (ref 3.8–10.5)
WBC # BLD: 11.66 K/UL — HIGH (ref 3.8–10.5)
WBC # FLD AUTO: 10 K/UL — SIGNIFICANT CHANGE UP (ref 3.8–10.5)
WBC # FLD AUTO: 11.66 K/UL — HIGH (ref 3.8–10.5)
WBC UR QL: SIGNIFICANT CHANGE UP /HPF (ref 0–5)

## 2018-02-04 PROCEDURE — 93010 ELECTROCARDIOGRAM REPORT: CPT

## 2018-02-04 PROCEDURE — 99223 1ST HOSP IP/OBS HIGH 75: CPT

## 2018-02-04 PROCEDURE — 70450 CT HEAD/BRAIN W/O DYE: CPT | Mod: 26

## 2018-02-04 RX ORDER — AZTREONAM 2 G
500 VIAL (EA) INJECTION EVERY 12 HOURS
Qty: 0 | Refills: 0 | Status: DISCONTINUED | OUTPATIENT
Start: 2018-02-04 | End: 2018-02-05

## 2018-02-04 RX ORDER — DEXTROSE 50 % IN WATER 50 %
1 SYRINGE (ML) INTRAVENOUS ONCE
Qty: 0 | Refills: 0 | Status: DISCONTINUED | OUTPATIENT
Start: 2018-02-04 | End: 2018-02-05

## 2018-02-04 RX ORDER — UMECLIDINIUM BROMIDE AND VILANTEROL TRIFENATATE 62.5; 25 UG/1; UG/1
1 POWDER RESPIRATORY (INHALATION) DAILY
Qty: 0 | Refills: 0 | Status: DISCONTINUED | OUTPATIENT
Start: 2018-02-04 | End: 2018-02-20

## 2018-02-04 RX ORDER — IPRATROPIUM/ALBUTEROL SULFATE 18-103MCG
3 AEROSOL WITH ADAPTER (GRAM) INHALATION EVERY 6 HOURS
Qty: 0 | Refills: 0 | Status: DISCONTINUED | OUTPATIENT
Start: 2018-02-04 | End: 2018-02-14

## 2018-02-04 RX ORDER — WARFARIN SODIUM 2.5 MG/1
5 TABLET ORAL ONCE
Qty: 0 | Refills: 0 | Status: COMPLETED | OUTPATIENT
Start: 2018-02-04 | End: 2018-02-04

## 2018-02-04 RX ORDER — SODIUM CHLORIDE 9 MG/ML
1000 INJECTION INTRAMUSCULAR; INTRAVENOUS; SUBCUTANEOUS ONCE
Qty: 0 | Refills: 0 | Status: COMPLETED | OUTPATIENT
Start: 2018-02-04 | End: 2018-02-04

## 2018-02-04 RX ORDER — SODIUM CHLORIDE 9 MG/ML
1000 INJECTION, SOLUTION INTRAVENOUS
Qty: 0 | Refills: 0 | Status: DISCONTINUED | OUTPATIENT
Start: 2018-02-04 | End: 2018-02-20

## 2018-02-04 RX ORDER — DEXTROSE 50 % IN WATER 50 %
12.5 SYRINGE (ML) INTRAVENOUS ONCE
Qty: 0 | Refills: 0 | Status: DISCONTINUED | OUTPATIENT
Start: 2018-02-04 | End: 2018-02-05

## 2018-02-04 RX ORDER — ACETAMINOPHEN 500 MG
975 TABLET ORAL ONCE
Qty: 0 | Refills: 0 | Status: COMPLETED | OUTPATIENT
Start: 2018-02-04 | End: 2018-02-04

## 2018-02-04 RX ORDER — SODIUM CHLORIDE 9 MG/ML
1000 INJECTION INTRAMUSCULAR; INTRAVENOUS; SUBCUTANEOUS
Qty: 0 | Refills: 0 | Status: DISCONTINUED | OUTPATIENT
Start: 2018-02-04 | End: 2018-02-06

## 2018-02-04 RX ORDER — GLUCAGON INJECTION, SOLUTION 0.5 MG/.1ML
1 INJECTION, SOLUTION SUBCUTANEOUS ONCE
Qty: 0 | Refills: 0 | Status: DISCONTINUED | OUTPATIENT
Start: 2018-02-04 | End: 2018-02-05

## 2018-02-04 RX ORDER — AZTREONAM 2 G
VIAL (EA) INJECTION
Qty: 0 | Refills: 0 | Status: DISCONTINUED | OUTPATIENT
Start: 2018-02-04 | End: 2018-02-05

## 2018-02-04 RX ORDER — POTASSIUM CHLORIDE 20 MEQ
40 PACKET (EA) ORAL EVERY 4 HOURS
Qty: 0 | Refills: 0 | Status: COMPLETED | OUTPATIENT
Start: 2018-02-04 | End: 2018-02-04

## 2018-02-04 RX ORDER — INSULIN LISPRO 100/ML
VIAL (ML) SUBCUTANEOUS
Qty: 0 | Refills: 0 | Status: DISCONTINUED | OUTPATIENT
Start: 2018-02-04 | End: 2018-02-05

## 2018-02-04 RX ORDER — CEFTRIAXONE 500 MG/1
1 INJECTION, POWDER, FOR SOLUTION INTRAMUSCULAR; INTRAVENOUS ONCE
Qty: 0 | Refills: 0 | Status: COMPLETED | OUTPATIENT
Start: 2018-02-04 | End: 2018-02-04

## 2018-02-04 RX ORDER — ASPIRIN/CALCIUM CARB/MAGNESIUM 324 MG
81 TABLET ORAL
Qty: 0 | Refills: 0 | COMMUNITY

## 2018-02-04 RX ORDER — KETOROLAC TROMETHAMINE 30 MG/ML
15 SYRINGE (ML) INJECTION ONCE
Qty: 0 | Refills: 0 | Status: DISCONTINUED | OUTPATIENT
Start: 2018-02-04 | End: 2018-02-04

## 2018-02-04 RX ORDER — AZTREONAM 2 G
500 VIAL (EA) INJECTION ONCE
Qty: 0 | Refills: 0 | Status: COMPLETED | OUTPATIENT
Start: 2018-02-04 | End: 2018-02-04

## 2018-02-04 RX ORDER — CHOLECALCIFEROL (VITAMIN D3) 125 MCG
1000 CAPSULE ORAL DAILY
Qty: 0 | Refills: 0 | Status: DISCONTINUED | OUTPATIENT
Start: 2018-02-04 | End: 2018-02-20

## 2018-02-04 RX ORDER — DEXTROSE 50 % IN WATER 50 %
25 SYRINGE (ML) INTRAVENOUS ONCE
Qty: 0 | Refills: 0 | Status: DISCONTINUED | OUTPATIENT
Start: 2018-02-04 | End: 2018-02-05

## 2018-02-04 RX ORDER — ACETAMINOPHEN 500 MG
650 TABLET ORAL EVERY 6 HOURS
Qty: 0 | Refills: 0 | Status: DISCONTINUED | OUTPATIENT
Start: 2018-02-04 | End: 2018-02-20

## 2018-02-04 RX ADMIN — Medication 1: at 18:42

## 2018-02-04 RX ADMIN — Medication 3 MILLILITER(S): at 17:52

## 2018-02-04 RX ADMIN — Medication 40 MILLIEQUIVALENT(S): at 14:37

## 2018-02-04 RX ADMIN — Medication 50 MILLIGRAM(S): at 21:26

## 2018-02-04 RX ADMIN — UMECLIDINIUM BROMIDE AND VILANTEROL TRIFENATATE 1 PUFF(S): 62.5; 25 POWDER RESPIRATORY (INHALATION) at 13:19

## 2018-02-04 RX ADMIN — Medication 3 MILLILITER(S): at 12:00

## 2018-02-04 RX ADMIN — SODIUM CHLORIDE 1000 MILLILITER(S): 9 INJECTION INTRAMUSCULAR; INTRAVENOUS; SUBCUTANEOUS at 01:52

## 2018-02-04 RX ADMIN — CEFTRIAXONE 100 GRAM(S): 500 INJECTION, POWDER, FOR SOLUTION INTRAMUSCULAR; INTRAVENOUS at 01:51

## 2018-02-04 RX ADMIN — Medication 15 MILLIGRAM(S): at 17:56

## 2018-02-04 RX ADMIN — Medication 50 MILLIGRAM(S): at 05:31

## 2018-02-04 RX ADMIN — Medication 975 MILLIGRAM(S): at 01:10

## 2018-02-04 RX ADMIN — Medication 40 MILLIEQUIVALENT(S): at 12:00

## 2018-02-04 RX ADMIN — WARFARIN SODIUM 5 MILLIGRAM(S): 2.5 TABLET ORAL at 22:33

## 2018-02-04 RX ADMIN — Medication 1000 UNIT(S): at 12:00

## 2018-02-04 RX ADMIN — Medication 650 MILLIGRAM(S): at 15:44

## 2018-02-04 NOTE — H&P ADULT - PROBLEM SELECTOR PLAN 1
See above.  Upgraded antibiotics to Azactam as above with recent hospitalization.  Blood and urine assays sent.  Renal US to exclude perinephric collection.

## 2018-02-04 NOTE — H&P ADULT - PROBLEM SELECTOR PLAN 3
See above.  Not on current treatment.  Follow FS BID.  HgbA1C.  Nutrition evaluation.  May consider low dose bedtime Lantus when weight in kg available.

## 2018-02-04 NOTE — H&P ADULT - NSHPLABSRESULTS_GEN_ALL_CORE
WBC 12.5K.  Hgb 14.4  Platelets of 179K.  INR 2.3.  K+ 4.6.  Random glucose of 187.  Cr 1.2.  Alb 3.6.  Troponin nil x 1.  U/A with 11 WBC with moderate leukocyte esterase.  Head CTT negative.  RVP screen nil.  Chest radiograph reviewed with trace pleural effusion.  EKG tracing reviewed with sinus tachycardia at 103 with Q III, F, and nonspecific ST T changes.  Telemetry with occasional trigeminy.

## 2018-02-04 NOTE — ED ADULT NURSE REASSESSMENT NOTE - NS ED NURSE REASSESS COMMENT FT1
Pt linen and diaper changed.
Have been awaiting azactam from pharmacy.
Pt appears calmer. No tremors noted. BP and heart rate decreased. NSR on monitor. Antibiotics administered. Awaiting RVP.
Pt being transferred to holding, VSS, afebrile, report given to Justina BAIRES RN in holding
Pt diaper and linens changed again. After 1L fluid finished and pt was straight cathed for residual urine, pt hands began trembling, became tachy to 160s. When trembling calmed, HR remained tachy to 103. EKG done and given to MD Wooten. Rectal temp of 102.6 recorded. PO tylenol administered.
received report from FERNANDEZ contreras, afebrile rectally, incontinence care provided, skin CDI. A&O x 3.

## 2018-02-04 NOTE — H&P ADULT - ATTENDING COMMENTS
NIGHT HOSPITALIST:  Patient/ daughter/ son/ son in law in attendance aware of course and agree with plan/care as above.  Given comorbidities, patient's prognosis long term is guarded.  Emotional support provided to patient/ family.  Care reviewed with covering NP.  Care assumed by the [facility].    Quincy Mena MD  [facility]

## 2018-02-04 NOTE — H&P ADULT - PROBLEM SELECTOR PLAN 2
On Coumadin for chronic recurrent DVT.  Echo.  Patient / family agree to continue with full AC and aware of risks/benefits.

## 2018-02-04 NOTE — H&P ADULT - REASON FOR ADMISSION
Patient noted by daughter Patient noted by daughter to be on a plank position at home with patient attempting to break a fall, chills, fever.

## 2018-02-04 NOTE — H&P ADULT - PROBLEM SELECTOR PLAN 4
Tele.   See above.  Would check Mg++.  May consider formal evaluation by her cardiologist and neurologist as above in the AM.

## 2018-02-04 NOTE — ED ADULT NURSE NOTE - OBJECTIVE STATEMENT
81 y/o female A&O x 3 PMH afib on coumadin, b/l DVTs on lovenox, DM type 2, breast and lung cancer presents to ED via EMS c/o syncopal episode at home this evening. Per daughter, pt was sitting on edge of bed and then found on her knees with hands on edge of bed a few minutes later calling for help. Her daughter attempted to help her up and felt pt "was dead weight and went limp." Pt was then sat on bed and daughter reports "her eyes rolled back and she clenched her eyes shut. She was foaming/gurgling at the mouth." Episode lasted a minute or two. Daughter denies head injury. Pt has no recollection of events. Pt was admitted to hospital 2 weeks ago for b/l DVTs and discharged with anticoagulation at home. Has home care nurse give her lovenox injections. Pt was having PT today when therapist noted L leg weakness. Per EMS, pt vomited once in ambulance and has BM upon arrival to ED. Denies chest pain, SOB, n/v, cough, urinary symptoms. Pt lungs clear b/l. Skin warm, dry, intact. Gross neuro intact. NSR on cardiac monitor. 22G IV placed in LAC. Pink "do not use" band applied to R arm due to past mastectomy and lymph node removal. Safety and comfort provided.

## 2018-02-04 NOTE — INPATIENT CERTIFICATION FOR MEDICARE PATIENTS - RISKS OF ADVERSE EVENTS
Concern for delay in diagnosis and treatment/Concern for worsening infectious process/Concern for neurologic deterioration/Concern for cardiopulmonary deterioration

## 2018-02-04 NOTE — H&P ADULT - ASSESSMENT
NIGHT HOSPITALIST:  Presentation of patient with presumed near syncopal episode in the setting of patient with clinical urosepsis with fever, chills, and suspect gram negative leonor bacteremia but with patient recently discharged from [place] will upgrade IV antibiotics to IV Azactam pending urine and blood isolate.  Will obtain renal US to exclude perinephric collection.  Doubt ictal episode but would consider formal evaluation by patient's neurologist, Dr. Rosenstein.  Patient admitted to telemetry with frequent NIGHT HOSPITALIST:  Presentation of patient with presumed near syncopal episode in the setting of patient with clinical urosepsis with fever, chills, and suspect gram negative leonor bacteremia but with patient recently discharged from [place] [facility] will upgrade IV antibiotics to IV Azactam pending urine and blood isolate.  Will obtain renal US to exclude perinephric collection.  Doubt ictal episode but would consider formal evaluation by patient's neurologist, Dr. Rosenstein.  Patient admitted to telemetry with frequent PVC on tele in the ER with occasional trigeminy>>will check Mg++ level, serial enzymes, echo  (last in 2015).  Patient with moderate hyperglycemia on random glucose, will follow FS twice a day for now (patient declines more frequent checks) and check HgbA1C.  Concerning catabolic state>>CTT trunk from last month nondiagnostic except for emphysema>>other issue if patient's glycemic control is resulting in a catabolic state.  Nutrition consult.  PT.

## 2018-02-04 NOTE — H&P ADULT - NSHPPHYSICALEXAM_GEN_ALL_CORE
Physical exam with an elderly Physical exam with an elderly, cachectic, chronically ill appearing F appears older than stated age.  /60  Tm 102.6F.  HR  87  RR 14  99% on NC.  HEENT< PERRL, EOMI, bitemporal wasting.  Neck supple, chest grossly clear, cor s1 s2, declined breast exam.  Abdomen soft scaphoid, normal bowel sounds nontender, no rebound.  Ext with 2++ B/L LE oedema with full range of motion of UE/LE.  Neurologic exam AxOx3.  Speech fluent.  Cognition grossly intact but mild short term memory impairment.  Interactive with examiner and family in attendance.  UE/LE 5/5.

## 2018-02-04 NOTE — PROVIDER CONTACT NOTE (OTHER) - ACTION/TREATMENT ORDERED:
PA notified. PA came to pt bedside. PA to order 500ml bolus, ABG, BMP, CBC, CKMB, Mag and Phos. PA and RN currently in room providing care to pt. Will continue to monitor. PA notified. PA came to pt bedside. PA to order 500ml bolus NS, ABG, BMP, CBC, CKMB, Mag and Phos. PA and RN currently in room providing care to pt. Will continue to monitor. NP notified. PA came to pt bedside. NP to order 500ml bolus NS, ABG, BMP, CBC, CKMB, Mag and Phos. NP and RN currently in room providing care to pt. Will continue to monitor.

## 2018-02-05 DIAGNOSIS — R50.9 FEVER, UNSPECIFIED: ICD-10-CM

## 2018-02-05 LAB
ANION GAP SERPL CALC-SCNC: 11 MMOL/L — SIGNIFICANT CHANGE UP (ref 5–17)
ANION GAP SERPL CALC-SCNC: 15 MMOL/L — SIGNIFICANT CHANGE UP (ref 5–17)
APTT BLD: 44.3 SEC — HIGH (ref 27.5–37.4)
BASOPHILS # BLD AUTO: 0.01 K/UL — SIGNIFICANT CHANGE UP (ref 0–0.2)
BASOPHILS NFR BLD AUTO: 0.1 % — SIGNIFICANT CHANGE UP (ref 0–2)
BUN SERPL-MCNC: 22 MG/DL — SIGNIFICANT CHANGE UP (ref 7–23)
BUN SERPL-MCNC: 24 MG/DL — HIGH (ref 7–23)
CALCIUM SERPL-MCNC: 8.3 MG/DL — LOW (ref 8.4–10.5)
CALCIUM SERPL-MCNC: 8.6 MG/DL — SIGNIFICANT CHANGE UP (ref 8.4–10.5)
CHLORIDE SERPL-SCNC: 105 MMOL/L — SIGNIFICANT CHANGE UP (ref 96–108)
CHLORIDE SERPL-SCNC: 107 MMOL/L — SIGNIFICANT CHANGE UP (ref 96–108)
CK MB CFR SERPL CALC: 1.1 NG/ML — SIGNIFICANT CHANGE UP (ref 0–3.8)
CO2 SERPL-SCNC: 20 MMOL/L — LOW (ref 22–31)
CO2 SERPL-SCNC: 23 MMOL/L — SIGNIFICANT CHANGE UP (ref 22–31)
CREAT ?TM UR-MCNC: 145 MG/DL — SIGNIFICANT CHANGE UP
CREAT SERPL-MCNC: 1.14 MG/DL — SIGNIFICANT CHANGE UP (ref 0.5–1.3)
CREAT SERPL-MCNC: 1.25 MG/DL — SIGNIFICANT CHANGE UP (ref 0.5–1.3)
CULTURE RESULTS: NO GROWTH — SIGNIFICANT CHANGE UP
EOSINOPHIL # BLD AUTO: 0.02 K/UL — SIGNIFICANT CHANGE UP (ref 0–0.5)
EOSINOPHIL NFR BLD AUTO: 0.2 % — SIGNIFICANT CHANGE UP (ref 0–6)
GLUCOSE BLDC GLUCOMTR-MCNC: 106 MG/DL — HIGH (ref 70–99)
GLUCOSE BLDC GLUCOMTR-MCNC: 154 MG/DL — HIGH (ref 70–99)
GLUCOSE BLDC GLUCOMTR-MCNC: 156 MG/DL — HIGH (ref 70–99)
GLUCOSE BLDC GLUCOMTR-MCNC: 199 MG/DL — HIGH (ref 70–99)
GLUCOSE SERPL-MCNC: 172 MG/DL — HIGH (ref 70–99)
GLUCOSE SERPL-MCNC: 184 MG/DL — HIGH (ref 70–99)
HCT VFR BLD CALC: 37.5 % — SIGNIFICANT CHANGE UP (ref 34.5–45)
HGB BLD-MCNC: 12.1 G/DL — SIGNIFICANT CHANGE UP (ref 11.5–15.5)
IMM GRANULOCYTES NFR BLD AUTO: 0.1 % — SIGNIFICANT CHANGE UP (ref 0–1.5)
INR BLD: 2.54 RATIO — HIGH (ref 0.88–1.16)
LYMPHOCYTES # BLD AUTO: 0.53 K/UL — LOW (ref 1–3.3)
LYMPHOCYTES # BLD AUTO: 5.4 % — LOW (ref 13–44)
MAGNESIUM SERPL-MCNC: 1.8 MG/DL — SIGNIFICANT CHANGE UP (ref 1.6–2.6)
MAGNESIUM SERPL-MCNC: 1.9 MG/DL — SIGNIFICANT CHANGE UP (ref 1.6–2.6)
MCHC RBC-ENTMCNC: 29.8 PG — SIGNIFICANT CHANGE UP (ref 27–34)
MCHC RBC-ENTMCNC: 32.3 GM/DL — SIGNIFICANT CHANGE UP (ref 32–36)
MCV RBC AUTO: 92.4 FL — SIGNIFICANT CHANGE UP (ref 80–100)
MONOCYTES # BLD AUTO: 1.16 K/UL — HIGH (ref 0–0.9)
MONOCYTES NFR BLD AUTO: 11.9 % — SIGNIFICANT CHANGE UP (ref 2–14)
NEUTROPHILS # BLD AUTO: 8.01 K/UL — HIGH (ref 1.8–7.4)
NEUTROPHILS NFR BLD AUTO: 82.3 % — HIGH (ref 43–77)
PHOSPHATE SERPL-MCNC: 1.4 MG/DL — LOW (ref 2.5–4.5)
PHOSPHATE SERPL-MCNC: 2 MG/DL — LOW (ref 2.5–4.5)
PLATELET # BLD AUTO: 155 K/UL — SIGNIFICANT CHANGE UP (ref 150–400)
POTASSIUM SERPL-MCNC: 4.2 MMOL/L — SIGNIFICANT CHANGE UP (ref 3.5–5.3)
POTASSIUM SERPL-MCNC: 4.5 MMOL/L — SIGNIFICANT CHANGE UP (ref 3.5–5.3)
POTASSIUM SERPL-SCNC: 4.2 MMOL/L — SIGNIFICANT CHANGE UP (ref 3.5–5.3)
POTASSIUM SERPL-SCNC: 4.5 MMOL/L — SIGNIFICANT CHANGE UP (ref 3.5–5.3)
PROT ?TM UR-MCNC: 93 MG/DL — HIGH (ref 0–12)
PROT/CREAT UR-RTO: 0.6 RATIO — HIGH (ref 0–0.2)
PROTHROM AB SERPL-ACNC: 29.2 SEC — HIGH (ref 10–13.1)
RAPID RVP RESULT: SIGNIFICANT CHANGE UP
RBC # BLD: 4.06 M/UL — SIGNIFICANT CHANGE UP (ref 3.8–5.2)
RBC # FLD: 15.6 % — HIGH (ref 10.3–14.5)
SODIUM SERPL-SCNC: 140 MMOL/L — SIGNIFICANT CHANGE UP (ref 135–145)
SODIUM SERPL-SCNC: 141 MMOL/L — SIGNIFICANT CHANGE UP (ref 135–145)
SPECIMEN SOURCE: SIGNIFICANT CHANGE UP
TROPONIN T SERPL-MCNC: <0.01 NG/ML — SIGNIFICANT CHANGE UP (ref 0–0.06)
WBC # BLD: 9.84 K/UL — SIGNIFICANT CHANGE UP (ref 3.8–10.5)
WBC # FLD AUTO: 9.84 K/UL — SIGNIFICANT CHANGE UP (ref 3.8–10.5)

## 2018-02-05 PROCEDURE — 76775 US EXAM ABDO BACK WALL LIM: CPT | Mod: 26

## 2018-02-05 PROCEDURE — 93306 TTE W/DOPPLER COMPLETE: CPT | Mod: 26

## 2018-02-05 RX ORDER — INSULIN LISPRO 100/ML
VIAL (ML) SUBCUTANEOUS
Qty: 0 | Refills: 0 | Status: DISCONTINUED | OUTPATIENT
Start: 2018-02-05 | End: 2018-02-16

## 2018-02-05 RX ORDER — WARFARIN SODIUM 2.5 MG/1
5 TABLET ORAL ONCE
Qty: 0 | Refills: 0 | Status: COMPLETED | OUTPATIENT
Start: 2018-02-05 | End: 2018-02-05

## 2018-02-05 RX ADMIN — Medication 650 MILLIGRAM(S): at 03:51

## 2018-02-05 RX ADMIN — UMECLIDINIUM BROMIDE AND VILANTEROL TRIFENATATE 1 PUFF(S): 62.5; 25 POWDER RESPIRATORY (INHALATION) at 09:40

## 2018-02-05 RX ADMIN — Medication 1: at 21:54

## 2018-02-05 RX ADMIN — Medication 1: at 09:38

## 2018-02-05 RX ADMIN — Medication 3 MILLILITER(S): at 05:33

## 2018-02-05 RX ADMIN — Medication 1000 UNIT(S): at 12:46

## 2018-02-05 RX ADMIN — Medication 85 MILLIMOLE(S): at 16:15

## 2018-02-05 RX ADMIN — Medication 3 MILLILITER(S): at 23:52

## 2018-02-05 RX ADMIN — Medication 650 MILLIGRAM(S): at 15:52

## 2018-02-05 RX ADMIN — Medication 3 MILLILITER(S): at 19:58

## 2018-02-05 RX ADMIN — Medication 50 MILLIGRAM(S): at 10:42

## 2018-02-05 RX ADMIN — WARFARIN SODIUM 5 MILLIGRAM(S): 2.5 TABLET ORAL at 21:54

## 2018-02-05 NOTE — CONSULT NOTE ADULT - ASSESSMENT
82F with h/o lung ca s/p lobectomy, melanoma s/p resection on right arm and lymph removal on that side, recent DVT on AC  presents fever and chills s/p fall

## 2018-02-05 NOTE — CONSULT NOTE ADULT - PROBLEM SELECTOR RECOMMENDATION 9
f/u blood cx  ua not very impressive  no signs or symptoms of pneumonia  if diarrhea persists would send stool for cdiff  blood cx neg 24 hours  urine cx negative  will d/c aztreonam as not sure what we are treating.  rvp negative.  follow temp and wbc f/u blood cx  ua not very impressive  no signs or symptoms of pneumonia  if diarrhea persists would send stool for cdiff  blood cx neg 24 hours  urine cx negative  will d/c aztreonam as not sure what we are treating.  rvp negative.  follow temp and wbc  may need further imaging.  could be a viral infection that we did not isolate on rvp  for now monitor off antibx

## 2018-02-05 NOTE — CONSULT NOTE ADULT - SUBJECTIVE AND OBJECTIVE BOX
[facility], Division of Infectious Diseases  MAYA Pyle, KEIRA Loving    CHARLEEN, MARU  82y, Female  [id number]    HPI--  HPI:  NIGHT HOSPITALIST:  Patient UNKNOWN to me previously, assigned to me at this point via the ER and by Dr. Morocho to admit this 81 y/o F--patient seen with adult daughter and son, son in law in attendance--patient with a history of lung CA with past LEFT lower lobectomy for lung CA presumed to be in remission, COPD, malignant melanoma, breast CA with past tylectomy presumed to be disease free, type 2 DM but not on current treatment, past RIGHT DVT on Coumadin with recent discharge from [place] in January 2018 for a LEFT leg DVT with patient resumed on Coumadin per patient's preference, with patient apparently initially declining rehab referral from last admission, with patient brought in by daughter following patient calling for help following daughter noted patient to be in a plank position at home after patient was attempting to break a fall.  Patient with fever, chills, rigors at home.  Patient with increased urination.  No HA, no focal weakness.  No LOC or head trauma.  Daughter was concerned if patient had an ictal episode with patient with (?) trismus.  No faecal or urinary incontinence.  No back pain, no tearing back pain.  No hip or extremity pain.  No abdominal pain, no red blood per rectum or melena.  Remaining review of systems not contributory. (04 Feb 2018 03:03)      PMH/PSH--  Breast cancer  Lung cancer, left  Diabetes mellitus  Afib  S/P colectomy  Status post left hip replacement  S/P lobectomy of lung      Allergies--      Medications--  Antibiotics: aztreonam  IVPB 500 milliGRAM(s) IV Intermittent every 12 hours  aztreonam  IVPB        Immunologic:   Other: acetaminophen   Tablet PRN  ALBUTerol/ipratropium for Nebulization  cholecalciferol  dextrose 5%.  dextrose 50% Injectable  dextrose 50% Injectable  dextrose 50% Injectable  dextrose Gel PRN  glucagon  Injectable PRN  insulin lispro (HumaLOG) corrective regimen sliding scale  sodium chloride 0.9%.  umeclidinium 62.5 MICROgram(s)/vilanterol 25 MICROgram(s) Inhaler      Social History--  EtOH: denies ***  Tobacco: denies ***  Drug Use: denies ***    Family/Marital History--  Family history of lung cancer (Sibling)  Family history of secondary cancer of bone and bone marrow  Family history of dementia    Remainder not relevant to clinical concern.    Travel/Environmental/Occupational History:  *** insert T/E/O Hx ***    Review of Systems:  A >=10-point review of systems was obtained.     Pertinent positives and negatives--  Constitutional: No fevers. No Chills. No Rigors.   Eyes:  ENMT:  Cardiovascular: No chest pain. No palpitations.  Respiratory: No shortness of breath. No cough.  Gastrointestinal: No nausea or vomiting. No diarrhea or constipation.   Genitourinary:  Musculoskeletal:  Skin:  Neurologic:  Psychiatric: Pleasant. Appropriate affect.  Endocrine:  Heme/Lymphatic:  Allergy/Immunologic:    Review of systems otherwise negative except as previously noted.    Physical Exam--  Vital Signs: T(F): 98.6 (02-05-18 @ 08:32), Max: 103.2 (02-05-18 @ 04:10)  HR: 88 (02-05-18 @ 08:32)  BP: 104/65 (02-05-18 @ 08:32)  RR: 18 (02-05-18 @ 08:32)  SpO2: 98% (02-05-18 @ 08:32)  Wt(kg): --  General: Nontoxic-appearing Female in no acute distress.  HEENT: AT/NC. PERRL. EOMI. Anicteric. Conjunctiva pink and moist. Oropharynx clear. Dentition fair.  Neck: Not rigid. No sense of mass.  Nodes: None palpable.  Lungs: Clear bilaterally without rales, wheezing or rhonchi  Heart: Regular rate and rhythm. No Murmur. No rub. No gallop. No palpable thrill.  Abdomen: Bowel sounds present and normoactive. Soft. Nondistended. Nontender. No sense of mass. No organomegaly.  Back: No spinal tenderness. No costovertebral angle tenderness.   Extremities: No cyanosis or clubbing. No edema.   Skin: Warm. Dry. Good turgor. No rash. No vasculitic stigmata.  Psychiatric: Appropriate affect and mood for situation.         Laboratory & Imaging Data--  CBC                        12.1   9.84  )-----------( 155      ( 05 Feb 2018 06:43 )             37.5       Chemistries  02-05    140  |  105  |  22  ----------------------------<  172<H>  4.2   |  20<L>  |  1.14    Ca    8.6      05 Feb 2018 06:43  Phos  2.0     02-04  Mg     1.8     02-05    TPro  7.6  /  Alb  3.6  /  TBili  0.9  /  DBili  x   /  AST  30  /  ALT  22  /  AlkPhos  66  02-03      Culture Data          Assessment--      Suggestions--        Rodrigo Hurley MD  [phone] [facility], Division of Infectious Diseases  MAYA Pyle A. Lee  [phone]  MARU VILLASEÑOR  82y, Female  [id number]      HPI:  81 y/o F---patient with a history of lung CA with past LEFT lower lobectomy for lung CA presumed to be in remission, COPD, malignant melanoma with  presumed to be disease free, type 2 DM but not on current treatment, past RIGHT DVT on Coumadin with recent discharge from [place] in January 2018 for a LEFT leg DVT with patient resumed on Coumadin per patient's preference, with patient apparently initially declining rehab referral from last admission, with patient brought in by daughter s/p fall.      Patient with fever, chills, rigors at home.  Currently denies cough, headache, no dysuria,  Had 2 loose bm otherwise states she feels well and ready to go home.    PMH/PSH--  Breast cancer  Lung cancer, left  Diabetes mellitus  Afib  S/P colectomy  Status post left hip replacement  S/P lobectomy of lung      Allergies--  codeine    Medications--  Antibiotics: aztreonam  IVPB 500 milliGRAM(s) IV Intermittent every 12 hours  aztreonam  IVPB        Immunologic:   Other: acetaminophen   Tablet PRN  ALBUTerol/ipratropium for Nebulization  cholecalciferol  dextrose 5%.  dextrose 50% Injectable  dextrose 50% Injectable  dextrose 50% Injectable  dextrose Gel PRN  glucagon  Injectable PRN  insulin lispro (HumaLOG) corrective regimen sliding scale  sodium chloride 0.9%.  umeclidinium 62.5 MICROgram(s)/vilanterol 25 MICROgram(s) Inhaler      Social History--  EtOH: denies ***  Tobacco: former  Drug Use: denies ***  lives in apt above daughter  Family/Marital History--  Family history of lung cancer (Sibling)  Family history of secondary cancer of bone and bone marrow  Family history of dementia    Remainder not relevant to clinical concern.    Travel/Environmental/Occupational History:  retired worked in sales    Review of Systems:  A >=10-point review of systems was obtained.     Pertinent positives and negatives--  Constitutional: + fevers. No Chills. No Rigors.   Eyes: no blurry vision  ENMT: no sore throat  Cardiovascular: No chest pain. No palpitations.  Respiratory: No shortness of breath. No cough.  Gastrointestinal: No nausea or vomiting. No diarrhea or constipation.   Genitourinary: no dysuria  Musculoskeletal:  no myalgias .    Skin: no rash  Neurologic: no headache  Psychiatric: no depression    Review of systems otherwise negative except as previously noted.    Physical Exam--  Vital Signs: T(F): 98.6 (02-05-18 @ 08:32), Max: 103.2 (02-05-18 @ 04:10)  HR: 88 (02-05-18 @ 08:32)  BP: 104/65 (02-05-18 @ 08:32)  RR: 18 (02-05-18 @ 08:32)  SpO2: 98% (02-05-18 @ 08:32)  Wt(kg): --  General: Nontoxic-appearing Female in no acute distress.  HEENT: AT/NC.  Anicteric. Conjunctiva pink and moist. Oropharynx clear. Dentitures  Neck: Not rigid. No sense of mass.  Nodes: None palpable.  Lungs: Clear bilaterally without rales, wheezing or rhonchi  Heart: Regular rate and rhythm. No Murmur. No rub. No gallop. No palpable thrill.  Abdomen: Bowel sounds present and normoactive. Soft. Nondistended. Nontender. Back: No spinal tenderness. No costovertebral angle tenderness.   Extremities: No cyanosis or clubbing. +  edema LLE> RLE  Skin: Warm. Dry. Good turgor. No rash. No vasculitic stigmata.  Psychiatric: Appropriate affect and mood for situation.         Laboratory & Imaging Data--  CBC                        12.1   9.84  )-----------( 155      ( 05 Feb 2018 06:43 )             37.5       Chemistries  02-05    140  |  105  |  22  ----------------------------<  172<H>  4.2   |  20<L>  |  1.14    Ca    8.6      05 Feb 2018 06:43  Phos  2.0     02-04  Mg     1.8     02-05    TPro  7.6  /  Alb  3.6  /  TBili  0.9  /  DBili  x   /  AST  30  /  ALT  22  /  AlkPhos  66  02-03      Culture Data    Culture - Blood (02.04.18 @ 05:55)    Specimen Source: .Blood Blood-Peripheral    Culture Results:   No growth to date.    Culture - Blood (02.04.18 @ 05:55)    Specimen Source: .Blood Blood-Peripheral    Culture Results:   No growth to date.    Urinalysis + Microscopic Examination (02.04.18 @ 00:56)    Urine Appearance: Clear    Urobilinogen: Negative    Specific Gravity: 1.018    Protein, Urine: 30 mg/dL    pH Urine: 7.0    Leukocyte Esterase Concentration: Moderate    Nitrite: Negative    Ketone - Urine: Small    Bilirubin: Negative    Color: Yellow    Glucose Qualitative, Urine: 50 mg/dL    Blood, Urine: Negative    Red Blood Cell - Urine: 3-5 /HPF    White Blood Cell - Urine: 11-25 /HPF    Bacteria: Few /HPF    Comment - Urine: Few Mucous        < from: Xray Chest 1 View AP/PA (02.03.18 @ 23:28) >    EXAM:  XR CHEST AP OR PA 1V                            PROCEDURE DATE:  02/03/2018            INTERPRETATION:  EXAMINATION: XR CHEST AP OR PA 1V    CLINICAL INDICATION: syncope    TECHNIQUE: Single portable view of the chest was obtained.    COMPARISON: 1/19/2018.    FINDINGS:     The heart is normal in size. The aorta is atherosclerotic. There are no   < from: CT Chest w/ IV Cont (01.20.18 @ 22:26) >    EXAM:  CT ABDOMEN AND PELVIS OC IC                          EXAM:  CT CHEST IC                            PROCEDURE DATE:  01/20/2018            INTERPRETATION:  CLINICAL INFORMATION: Patient with new left DVT, right   DVT. History of breast and lung cancer. Rule out recurrent malignancy.    COMPARISON: None.    PROCEDURE:   CT of the Chest, Abdomen and Pelvis was performed with intravenous   contrast.   Intravenous contrast: 90 ml Omnipaque 350. 10 ml discarded.  Oral contrast: positive contrast was administered.  Sagittal and coronal reformats were performed.    FINDINGS:    CHEST:     LUNGS AND LARGE AIRWAYS: Patent central airways. Left lower lobectomy.   Centrilobular emphysema. Bibasilar atelectasis.  PLEURA: No pleural effusion.  VESSELS: Thoracic aortic and coronary artery atherosclerosis.  HEART: Heart size is normal. No pericardial effusion.  MEDIASTINUM AND LETICIA: No lymphadenopathy.  CHEST WALL AND LOWER NECK: Within normal limits.    ABDOMEN AND PELVIS:    LIVER: Within normal limits.  BILE DUCTS: Normal caliber.  GALLBLADDER: Within normal limits.  SPLEEN: Within normal limits.  PANCREAS: Within normal limits.  ADRENALS: Within normal limits.  KIDNEYS/URETERS: Bilateral renal cysts. Right interpolar renal cortical   scarring.    BLADDER: Within normal limits.  REPRODUCTIVE ORGANS: The uterus and adnexa are within normal limits aside   from a right ovarian cyst.    BOWEL: No bowel obstruction. Patient is status post partial right   colectomy. Scattered colon diverticula with no evidence of diverticulitis.  PERITONEUM: No ascites.  VESSELS:  Extensive calcific atherosclerotic disease.  RETROPERITONEUM: No lymphadenopathy.    ABDOMINAL WALL: Within normal limits.  BONES: Degenerative changes and scoliosis of the visualized spine. Left   hip replacement.    IMPRESSION: No evidence of metastatic disease in the chest, abdomen, and     < end of copied text >  focal pulmonary consolidations or pneumothorax. There are postsurgical   changes in the left hemithorax, consistent with prior history of   lobectomy. There is stable blunting of the left costophrenic angle.    IMPRESSION:   No focal consolidation. Postsurgical changes. Stable blunting of the left   cardiophrenic angle.        Rapid Respiratory Viral Panel (02.04.18 @ 01:39)    Rapid RVP Result: NotDetec: The [employer] RVP Rapid uses polymerase chain reaction (PCR) and melt  curve analysis to screen for adenovirus; coronavirus HKU1, NL63, 229E,  OC43; human metapneumovirus (hMPV); human enterovirus/rhinovirus  (Entero/RV); influenza A; influenza A/H1;influenza A/H3; influenza  A/H1-2009; influenza B; parainfluenza viruses 1, 2, 3, 4; respiratory  syncytial virus; Bordetella pertussis; Mycoplasma pneumoniae; and  Chlamydophila pneumoniae.

## 2018-02-05 NOTE — PROGRESS NOTE ADULT - SUBJECTIVE AND OBJECTIVE BOX
Patient is a 82y old  Female who presents with a chief complaint of Patient noted by daughter to be on a plank position at home with patient attempting to break a fall, chills, fever. (2018 03:03)      SUBJECTIVE / OVERNIGHT EVENTS:    She tells me she feels great; she knows she had a fever last night regardless    Vital Signs Last 24 Hrs  T(C): 37 (2018 08:32), Max: 39.6 (2018 04:10)  T(F): 98.6 (2018 08:32), Max: 103.2 (2018 04:10)  HR: 88 (2018 08:32) (65 - 95)  BP: 104/65 (2018 08:32) (85/38 - 153/79)  BP(mean): --  RR: 18 (2018 08:32) (18 - 18)  SpO2: 98% (2018 08:32) (94% - 99%)  I&O's Summary    2018 07:01  -  2018 07:00  --------------------------------------------------------  IN: 550 mL / OUT: 0 mL / NET: 550 mL    2018 07:01  -  2018 10:41  --------------------------------------------------------  IN: 350 mL / OUT: 0 mL / NET: 350 mL        GENERAL: NAD, AAOx3  HEAD:  Atraumatic, Normocephalic  EYES: EOMI  NECK: Supple, No JVD, No LAD  CHEST/LUNG: Clear to auscultation bilaterally  HEART: Regular rate and rhythm; nl S1/S2; No murmurs, rubs, or gallops  ABDOMEN: Soft, Nontender, Nondistended; Bowel sounds present  EXTREMITIES:  2+ Peripheral Pulses, 2+ b/l LE edema  SKIN: No rashes or lesions  NEURO: nonfocal CN/motor/sensory/reflexes    LABS:                        12.1   9.84  )-----------( 155      ( 2018 06:43 )             37.5     02-    140  |  105  |  22  ----------------------------<  172<H>  4.2   |  20<L>  |  1.14    Ca    8.6      2018 06:43  Phos  2.0     02-  Mg     1.8     -    TPro  7.6  /  Alb  3.6  /  TBili  0.9  /  DBili  x   /  AST  30  /  ALT  22  /  AlkPhos  66  02-03    PT/INR - ( 2018 06:44 )   PT: 29.2 sec;   INR: 2.54 ratio         PTT - ( 2018 06:44 )  PTT:44.3 sec  CAPILLARY BLOOD GLUCOSE      POCT Blood Glucose.: 154 mg/dL (2018 07:49)  POCT Blood Glucose.: 183 mg/dL (2018 22:01)  POCT Blood Glucose.: 158 mg/dL (2018 18:12)  POCT Blood Glucose.: 146 mg/dL (2018 12:57)    CARDIAC MARKERS ( 2018 23:13 )  x     / <0.01 ng/mL / x     / x     / 1.1 ng/mL  CARDIAC MARKERS ( 2018 23:23 )  x     / <0.01 ng/mL / x     / x     / x          Urinalysis Basic - ( 2018 00:56 )    Color: Yellow / Appearance: Clear / S.018 / pH: x  Gluc: x / Ketone: Small  / Bili: Negative / Urobili: Negative   Blood: x / Protein: 30 mg/dL / Nitrite: Negative   Leuk Esterase: Moderate / RBC: 3-5 /HPF / WBC 11-25 /HPF   Sq Epi: x / Non Sq Epi: x / Bacteria: Few /HPF        RADIOLOGY & ADDITIONAL TESTS:    Imaging Personally Reviewed:  [x] YES  [ ] NO    Consultant(s) Notes Reviewed:  [x] YES  [ ] NO      MEDICATIONS  (STANDING):  ALBUTerol/ipratropium for Nebulization 3 milliLiter(s) Nebulizer every 6 hours  aztreonam  IVPB 500 milliGRAM(s) IV Intermittent every 12 hours  aztreonam  IVPB      cholecalciferol 1000 Unit(s) Oral daily  dextrose 5%. 1000 milliLiter(s) (50 mL/Hr) IV Continuous <Continuous>  dextrose 50% Injectable 12.5 Gram(s) IV Push once  dextrose 50% Injectable 25 Gram(s) IV Push once  dextrose 50% Injectable 25 Gram(s) IV Push once  insulin lispro (HumaLOG) corrective regimen sliding scale   SubCutaneous two times a day before meals  sodium chloride 0.9%. 1000 milliLiter(s) (50 mL/Hr) IV Continuous <Continuous>  umeclidinium 62.5 MICROgram(s)/vilanterol 25 MICROgram(s) Inhaler 1 Puff(s) Inhalation daily    MEDICATIONS  (PRN):  acetaminophen   Tablet 650 milliGRAM(s) Oral every 6 hours PRN For Temp greater than 38 C (100.4 F)  dextrose Gel 1 Dose(s) Oral once PRN Blood Glucose LESS THAN 70 milliGRAM(s)/deciliter  glucagon  Injectable 1 milliGRAM(s) IntraMuscular once PRN Glucose LESS THAN 70 milligrams/deciliter      Care Discussed with Consultants/Other Providers [x] YES  [ ] NO    HEALTH ISSUES - PROBLEM Dx:  Syncope, unspecified syncope type: Syncope, unspecified syncope type  Type 2 diabetes mellitus: Type 2 diabetes mellitus  DVT (deep venous thrombosis): DVT (deep venous thrombosis)  Urinary tract infection without hematuria, site unspecified: Urinary tract infection without hematuria, site unspecified

## 2018-02-06 DIAGNOSIS — R33.9 RETENTION OF URINE, UNSPECIFIED: ICD-10-CM

## 2018-02-06 LAB
APPEARANCE UR: CLEAR — SIGNIFICANT CHANGE UP
BACTERIA # UR AUTO: NEGATIVE — SIGNIFICANT CHANGE UP
BILIRUB UR-MCNC: NEGATIVE — SIGNIFICANT CHANGE UP
COLOR SPEC: YELLOW — SIGNIFICANT CHANGE UP
CULTURE RESULTS: NO GROWTH — SIGNIFICANT CHANGE UP
DIFF PNL FLD: ABNORMAL
EPI CELLS # UR: 2 /HPF — SIGNIFICANT CHANGE UP (ref 0–5)
GLUCOSE BLDC GLUCOMTR-MCNC: 118 MG/DL — HIGH (ref 70–99)
GLUCOSE BLDC GLUCOMTR-MCNC: 126 MG/DL — HIGH (ref 70–99)
GLUCOSE BLDC GLUCOMTR-MCNC: 177 MG/DL — HIGH (ref 70–99)
GLUCOSE BLDC GLUCOMTR-MCNC: 266 MG/DL — HIGH (ref 70–99)
GLUCOSE BLDC GLUCOMTR-MCNC: 284 MG/DL — HIGH (ref 70–99)
GLUCOSE UR QL: NEGATIVE MG/DL — SIGNIFICANT CHANGE UP
HYALINE CASTS # UR AUTO: 4 /LPF — SIGNIFICANT CHANGE UP (ref 0–7)
KETONES UR-MCNC: NEGATIVE — SIGNIFICANT CHANGE UP
LEUKOCYTE ESTERASE UR-ACNC: NEGATIVE — SIGNIFICANT CHANGE UP
NITRITE UR-MCNC: NEGATIVE — SIGNIFICANT CHANGE UP
PH UR: 5.5 — SIGNIFICANT CHANGE UP (ref 5–8)
PROT UR-MCNC: 30 MG/DL
RBC CASTS # UR COMP ASSIST: 6 /HPF — HIGH (ref 0–4)
SP GR SPEC: 1.02 — SIGNIFICANT CHANGE UP (ref 1.01–1.02)
SPECIMEN SOURCE: SIGNIFICANT CHANGE UP
UROBILINOGEN FLD QL: NEGATIVE MG/DL — SIGNIFICANT CHANGE UP
WBC UR QL: 1 /HPF — SIGNIFICANT CHANGE UP (ref 0–5)

## 2018-02-06 PROCEDURE — 71260 CT THORAX DX C+: CPT | Mod: 26

## 2018-02-06 PROCEDURE — 74177 CT ABD & PELVIS W/CONTRAST: CPT | Mod: 26

## 2018-02-06 RX ORDER — DIPHENHYDRAMINE HCL 50 MG
50 CAPSULE ORAL ONCE
Qty: 0 | Refills: 0 | Status: COMPLETED | OUTPATIENT
Start: 2018-02-06 | End: 2018-02-06

## 2018-02-06 RX ORDER — SODIUM CHLORIDE 9 MG/ML
1000 INJECTION INTRAMUSCULAR; INTRAVENOUS; SUBCUTANEOUS
Qty: 0 | Refills: 0 | Status: DISCONTINUED | OUTPATIENT
Start: 2018-02-06 | End: 2018-02-08

## 2018-02-06 RX ADMIN — SODIUM CHLORIDE 50 MILLILITER(S): 9 INJECTION INTRAMUSCULAR; INTRAVENOUS; SUBCUTANEOUS at 05:53

## 2018-02-06 RX ADMIN — Medication 25 MILLIGRAM(S): at 16:44

## 2018-02-06 RX ADMIN — Medication 85 MILLIMOLE(S): at 12:03

## 2018-02-06 RX ADMIN — UMECLIDINIUM BROMIDE AND VILANTEROL TRIFENATATE 1 PUFF(S): 62.5; 25 POWDER RESPIRATORY (INHALATION) at 11:50

## 2018-02-06 RX ADMIN — Medication 3 MILLILITER(S): at 13:37

## 2018-02-06 RX ADMIN — Medication 1000 UNIT(S): at 13:36

## 2018-02-06 RX ADMIN — Medication 3 MILLILITER(S): at 05:53

## 2018-02-06 RX ADMIN — Medication 650 MILLIGRAM(S): at 14:42

## 2018-02-06 RX ADMIN — Medication 50 MILLIGRAM(S): at 20:04

## 2018-02-06 RX ADMIN — Medication 3 MILLILITER(S): at 17:35

## 2018-02-06 NOTE — DIETITIAN INITIAL EVALUATION ADULT. - ORAL INTAKE PTA
good/Pt reports a good PO intake PTA. States daughter preform food shopping and cooking. Breakfast: eggs toast, juice. Lunch: Eggs, juice and tuna fish sandwich. Dinner: protein, vegetable, starch. Drinks: water, juice,

## 2018-02-06 NOTE — PHYSICAL THERAPY INITIAL EVALUATION ADULT - PRECAUTIONS/LIMITATIONS, REHAB EVAL
Coumadin with recent discharge from [place] in January 2018 for a LEFT leg DVT with patient resumed on Coumadin per patient's preference, with patient apparently initially declining rehab referral from last admission, with patient brought in by daughter following patient calling for help following daughter noted patient to be in a plank position at home after patient was attempting to break a fall.  Patient with fever, chills, rigors at home.  Patient with increased urination.  No HA, no focal weakness.  No LOC or head trauma.  Daughter was concerned if patient had an ictal episode with patient with (?) trismus. Coumadin with recent discharge from [place] in January 2018 for a LEFT leg DVT with patient resumed on Coumadin per patient's preference, with patient apparently initially declining rehab referral from last admission, with patient brought in by daughter following patient calling for help following daughter noted patient to be in a plank position at home after patient was attempting to break a fall.  Patient with fever, chills, rigors at home.  Patient with increased urination.  No HA, no focal weakness.  No LOC or head trauma.  Daughter was concerned if patient had an ictal episode with patient with (?) trismus./fall precautions

## 2018-02-06 NOTE — PHYSICAL THERAPY INITIAL EVALUATION ADULT - TRANSFER SAFETY CONCERNS NOTED: SIT/STAND, REHAB EVAL
stepping too close to front of assistive device/inability to maintain weight-bearing restrictions w/o assist/decreased step length/decreased weight-shifting ability

## 2018-02-06 NOTE — DIETITIAN INITIAL EVALUATION ADULT. - OTHER INFO
Nutrition consult received for nutrition services assessment. Pt seen, reports a good PO intake but dislikes inhouse foods. Pt was amenable to Menu selection assistance. Pt denies having DM and does not monitor BG levels PTA. Pt was amenable to a low Na and coumadin diet education. States she will give the education to her daughters as they do her cooking. Pt takes Vit D3. Denies chewing/swallowing difficulty. NKFA

## 2018-02-06 NOTE — DIETITIAN INITIAL EVALUATION ADULT. - NS AS NUTRI INTERV ED CONTENT
Other (specify)/Purpose of the nutrition education/Nutrition relationship to health/disease/Discussed coumadin/vitamin K drug interaction. Stressed consistent intake of vitamin K and reviewed foods high in Vitamin K. Reviewed Coumadin booklet with Pt. Encouraged a low NA diet, discouraged added salt. RD remains available to monitor PO intake, wt, labs and diet education review

## 2018-02-06 NOTE — PROGRESS NOTE ADULT - PROBLEM SELECTOR PLAN 1
so far no focal infectious etiology isolated as a reason for the fever  blood cx neg, rvp neg urine cx neg and ua is negative  echo no vegetations noted  urinary retention now with mcadams  fever curve seems to be decreasing  she is off antibx now. and cont to monitor  Will monitor for fever if persists, will get CT scan chest/abd/pelvis with IV and po contrast.  would encourage oob

## 2018-02-06 NOTE — PROGRESS NOTE ADULT - SUBJECTIVE AND OBJECTIVE BOX
DEveloped urinary retention last night  Again she denies any cough, diarrhea, abd pain, flank pain, urinary urgency/dysuria  last fever 5:30 PM yesterday  she is not interested in doing EEG this admission    Vital Signs Last 24 Hrs  T(C): 36.9 (2018 04:06), Max: 39.2 (2018 15:51)  T(F): 98.5 (2018 04:06), Max: 102.6 (2018 15:51)  HR: 79 (2018 04:06) (76 - 79)  BP: 110/67 (2018 04:06) (110/67 - 155/58)  BP(mean): --  RR: 18 (2018 04:06) (18 - 18)  SpO2: 94% (2018 04:06) (94% - 99%)    GENERAL: NAD, AAOx3  HEAD:  Atraumatic, Normocephalic  EYES: EOMI  NECK: Supple, No JVD, No LAD  CHEST/LUNG: Clear to auscultation bilaterally  HEART: Regular rate and rhythm; nl S1/S2; No murmurs, rubs, or gallops  ABDOMEN: Soft, Nontender, Nondistended; Bowel sounds present  EXTREMITIES:  2+ Peripheral Pulses, 2+ b/l LE edema  SKIN: No rashes or lesions  NEURO: nonfocal CN/motor/sensory/reflexes    LABS:                        12.1   6.0   )-----------( 124      ( 2018 09:02 )             35.2     02-06    141  |  108  |  15  ----------------------------<  144<H>  3.5   |  22  |  0.94    Ca    8.3<L>      2018 09:01  Phos  1.7     02-06  Mg     1.8     02-06      PT/INR - ( 2018 09:02 )   PT: 33.1 sec;   INR: 2.97 ratio         PTT - ( 2018 09:02 )  PTT:44.2 sec  CAPILLARY BLOOD GLUCOSE      POCT Blood Glucose.: 118 mg/dL (2018 07:37)  POCT Blood Glucose.: 199 mg/dL (2018 21:19)  POCT Blood Glucose.: 106 mg/dL (2018 15:45)    CARDIAC MARKERS ( 2018 23:13 )  x     / <0.01 ng/mL / x     / x     / 1.1 ng/mL      Urinalysis Basic - ( 2018 07:07 )    Color: Yellow / Appearance: Clear / S.017 / pH: x  Gluc: x / Ketone: Negative  / Bili: Negative / Urobili: Negative mg/dL   Blood: x / Protein: 30 mg/dL / Nitrite: Negative   Leuk Esterase: Negative / RBC: 6 /HPF / WBC 1 /HPF   Sq Epi: x / Non Sq Epi: 2 /HPF / Bacteria: Negative

## 2018-02-06 NOTE — PHYSICAL THERAPY INITIAL EVALUATION ADULT - LIVES WITH, PROFILE
children/patient states she lives in a house her dtr lives downstairs with her dtr and son in law and her kids, uses cane , has about 1 flight of stairs to go upstairs to her house

## 2018-02-06 NOTE — PROGRESS NOTE ADULT - PROBLEM SELECTOR PLAN 1
check TSH  if recurs, will do CT chest/abd/pelvis with IV and PO contrast to rule out abscess/tumor  appreciate ID

## 2018-02-06 NOTE — DIETITIAN INITIAL EVALUATION ADULT. - ENERGY NEEDS
(reported) Ht: 5'3", Wt: 173.5lbs, BMI: 30.7kg/m2, IBW: 115lbs(+/-10%), 154%IBW  Pertinent information: Pt admitted for near fall. Per chart, Pt with history of lung cancer, breast cancer, T2DM, and DVT. Per chart, Pt with urosepsis and DVT in lower extremity.   +2 right leg, and +3 left leg Edema, Skin intact

## 2018-02-06 NOTE — PROGRESS NOTE ADULT - SUBJECTIVE AND OBJECTIVE BOX
[facility], Division of Infectious Diseases  MAYA Pyle A. Lee  [phone]  Name: MARU VILLASEÑOR  Age: 82y  Gender: Female  MRN: [id number]    Interval History--  Notes reviewed  Feels well, states there is nothing wrong with her.  yesterday has some lower abd pain and required mcadams catheter with relief    Past Medical History--  Breast cancer  Lung cancer, left  Diabetes mellitus  Afib  S/P colectomy  Status post left hip replacement  S/P lobectomy of lung  DVT    For details regarding the patient's social history, family history, and other miscellaneous elements, please refer the initial infectious diseases consultation and/or the admitting history and physical examination for this admission.    Allergies    codeine (Fever; Rash)  contrast media (iodine-based) (Other)    Intolerances        Medications--  Antibiotics:    Immunologic:    Other:  acetaminophen   Tablet PRN  ALBUTerol/ipratropium for Nebulization  cholecalciferol  dextrose 5%.  insulin lispro (HumaLOG) corrective regimen sliding scale  sodium chloride 0.9%.  umeclidinium 62.5 MICROgram(s)/vilanterol 25 MICROgram(s) Inhaler      Review of Systems--  A 10-point review of systems was obtained.     Pertinent positives and negatives--  Constitutional: + fevers. No Chills. No Rigors.   Cardiovascular: No chest pain. No palpitations.  Respiratory: No shortness of breath. No cough.  Gastrointestinal: No nausea or vomiting. No diarrhea or constipation.   Psychiatric: no anxiety    Review of systems otherwise negative except as previously noted.    Physical Examination--  Vital Signs: T(F): 98.5 (02-06-18 @ 04:06), Max: 102.6 (02-05-18 @ 15:51)  HR: 79 (02-06-18 @ 04:06)  BP: 110/67 (02-06-18 @ 04:06)  RR: 18 (02-06-18 @ 04:06)  SpO2: 94% (02-06-18 @ 04:06)  Wt(kg): --  General: Nontoxic-appearing Female in no acute distress.  HEENT: AT/NC. Anicteric. Conjunctiva pink and moist.  Neck: Not rigid. No sense of mass.  Nodes: None palpable.  Lungs: Clear bilaterally without rales, wheezing or rhonchi  Heart: Regular rate and rhythm. No Murmur. No rub. No gallop. No palpable thrill.  Abdomen: Bowel sounds present and normoactive. Soft. Nondistended. Nontender.    mcadams catheter  Extremities: No cyanosis or clubbing. + edema L>R.   Skin: Warm. Dry. Good turgor. No rash. No vasculitic stigmata.  Psychiatric: Appropriate affect and mood for situation.         Laboratory Studies--  CBC                        12.1   6.0   )-----------( 124      ( 06 Feb 2018 09:02 )             35.2       Chemistries  02-06    141  |  108  |  15  ----------------------------<  144<H>  3.5   |  22  |  0.94    Ca    8.3<L>      06 Feb 2018 09:01  Phos  1.7     02-06  Mg     1.8     02-06        Culture Data      Culture - Blood (02.04.18 @ 05:55)    Specimen Source: .Blood Blood-Peripheral    Culture Results:   No growth to date.        Culture - Blood (02.04.18 @ 05:55)    Specimen Source: .Blood Blood-Peripheral    Culture Results:   No growth to date.        Culture - Urine (02.04.18 @ 05:11)    Specimen Source: .Urine Catheterized    Culture Results:   No growth        Rapid Respiratory Viral Panel (02.05.18 @ 20:09)    Rapid RVP Result: NotDetec: The FilmArray RVP Rapid uses polymerase chain reaction (PCR) and melt  curve analysis to screen for adenovirus; coronavirus HKU1, NL63, 229E,  OC43; human metapneumovirus (hMPV); human enterovirus/rhinovirus  (Entero/RV); influenza A; influenza A/H1;influenza A/H3; influenza  A/H1-2009; influenza B; parainfluenza viruses 1, 2, 3, 4; respiratory  syncytial virus; Bordetella pertussis; Mycoplasma pneumoniae; and  Chlamydophila pneumoniae.    < from: US Renal (02.05.18 @ 14:52) >  EXAM:  US KIDNEY(S)                            PROCEDURE DATE:  02/05/2018            INTERPRETATION:  CLINICAL INFORMATION: Sepsis, cystitis. Rule out renal   collection.    COMPARISON:  CT 1/20/2018.    TECHNIQUE: Sonography of the kidneys and bladder.     FINDINGS:    Right kidney: 9.8 cm. Mild fullness of the renal collecting system is   unchanged without julia hydronephrosis. Small 1 cm upper pole cyst and   interpolar cortical scarring..    Left kidney:  10.9 cm. No hydronephrosis. Scattered cysts measuring up to   4 cm in the upper pole.    Urinary bladder: Within normal limits.    IMPRESSION:     No evidence of renal collection.    Mild fullness of the right renal collecting system is unchanged without   julia hydronephrosis.    < end of copied text >        < from: Xray Chest 1 View AP/PA (02.03.18 @ 23:28) >  EXAM:  XR CHEST AP OR PA 1V                            PROCEDURE DATE:  02/03/2018            INTERPRETATION:  EXAMINATION: XR CHEST AP OR PA 1V    CLINICAL INDICATION: syncope    TECHNIQUE: Single portable view of the chest was obtained.    COMPARISON: 1/19/2018.    FINDINGS:     The heart is normal in size. The aorta is atherosclerotic. There are no   focal pulmonary consolidations or pneumothorax. There are postsurgical   changes in the left hemithorax, consistent with prior history of   lobectomy. There is stable blunting of the left costophrenic angle.    IMPRESSION:   No focal consolidation. Postsurgical changes. Stable blunting of the left   cardiophrenic angle.    < end of copied text >        < from: Transthoracic Echocardiogram (02.05.18 @ 12:44) >  Patient name: MARU VILLASEÑOR  YOB: 1935   Age: 82 (F)   MR#: [id number]  Study Date: 2/5/2018  [facility]  Study quality: Technically good  Referring Physician: Darrick Morocho MD  Blood Pressure: 144/78 mmHg  Height: 163 cm  Weight: 76 kg  BSA: 1.8 m2  ------------------------------------------------------------------------  PROCEDURE: Transthoracic echocardiogram with 2-D, M-Mode  and complete spectral and color flow Doppler.  INDICATION: Syncope and collapse (R55)  ------------------------------------------------------------------------  Dimensions:    Normal Values:  LA:     3.7    2.0 - 4.0 cm  Ao:     2.5    2.0 - 3.8 cm  SEPTUM: 1.1    0.6 - 1.2 cm  PWT:    0.9    0.6 - 1.1 cm  LVIDd:  4.1    3.0 - 5.6 cm  LVIDs:  2.2    1.8 - 4.0 cm  Derived variables:  LVMI: 73 g/m2  RWT: 0.43  Fractional short: 46 %  EF (Teicholtz): 78 %  ------------------------------------------------------------------------  Observations:  Mitral Valve: Mitral annular calcification, otherwise  normal mitral valve. Mild-moderate mitral regurgitation.  Aortic Valve/Aorta: Calcified trileaflet aortic valve with  normal opening.  Aortic Root: 2.5 cm.  Left Atrium: Normal left atrium.  LA volume index =19  cc/m2.  Left Ventricle: Normal to hyperdynamic left ventricular  systolic function. No segmental wall motion abnormalities.  Normal left ventricular internal dimensions and wall  thicknesses. Moderate diastolic dysfunction (Stage II).  Right Heart: Normal right atrium. Normal right ventricular  size and function. Normal tricuspid valve. Mild tricuspid  regurgitation. Normal pulmonic valve. Minimal pulmonic  regurgitation.  Pericardium/Pleura: Thickened pericardium with minimal  pericardial effusion.  Hemodynamic: Estimated right atrial pressure is 8 mm Hg.  Estimated right ventricular systolic pressure equals 37 mm  Hg, assuming right atrial pressure equals 8 mm Hg,  consistent with borderline pulmonary hypertension.  ------------------------------------------------------------------------  Conclusions:  1. Mitral annular calcification, otherwise normal mitral  valve. Mild-moderate mitral regurgitation.  2. Normal left ventricular internal dimensions and wall  thicknesses.  3. Normal to hyperdynamic left ventricular systolic  function. No segmental wall motion abnormalities.  4. Moderate diastolic dysfunction (Stage II).    < end of copied text >

## 2018-02-06 NOTE — PHYSICAL THERAPY INITIAL EVALUATION ADULT - PERTINENT HX OF CURRENT PROBLEM, REHAB EVAL
81 y/o F--patient seen with adult daughter and son, son in law in attendance--patient with a history of lung CA with past LEFT lower lobectomy for lung CA presumed to be in remission, COPD, malignant melanoma, breast CA with past tylectomy presumed to be disease free, type 2 DM but not on current treatment, past RIGHT DVT on

## 2018-02-06 NOTE — PROVIDER CONTACT NOTE (OTHER) - RECOMMENDATIONS
tyelenol 650 mg po given ,CT ABDOMEN with iv contrast pending,as patient allergic to contrast iv solumedrol and iv benadryl ordered

## 2018-02-06 NOTE — PHYSICAL THERAPY INITIAL EVALUATION ADULT - CRITERIA FOR SKILLED THERAPEUTIC INTERVENTIONS
functional limitations in following categories/risk reduction/prevention/rehab potential/impairments found/predicted duration of therapy intervention/therapy frequency/anticipated discharge recommendation

## 2018-02-07 DIAGNOSIS — R56.9 UNSPECIFIED CONVULSIONS: ICD-10-CM

## 2018-02-07 LAB
ALBUMIN SERPL ELPH-MCNC: 2.6 G/DL — LOW (ref 3.3–5)
ALP SERPL-CCNC: 66 U/L — SIGNIFICANT CHANGE UP (ref 40–120)
ALT FLD-CCNC: 31 U/L — SIGNIFICANT CHANGE UP (ref 10–45)
ANION GAP SERPL CALC-SCNC: 12 MMOL/L — SIGNIFICANT CHANGE UP (ref 5–17)
APTT BLD: 45.2 SEC — HIGH (ref 27.5–37.4)
AST SERPL-CCNC: 25 U/L — SIGNIFICANT CHANGE UP (ref 10–40)
BILIRUB SERPL-MCNC: <0.2 MG/DL — SIGNIFICANT CHANGE UP (ref 0.2–1.2)
BUN SERPL-MCNC: 15 MG/DL — SIGNIFICANT CHANGE UP (ref 7–23)
CALCIUM SERPL-MCNC: 8.1 MG/DL — LOW (ref 8.4–10.5)
CHLORIDE SERPL-SCNC: 108 MMOL/L — SIGNIFICANT CHANGE UP (ref 96–108)
CO2 SERPL-SCNC: 20 MMOL/L — LOW (ref 22–31)
CREAT SERPL-MCNC: 0.95 MG/DL — SIGNIFICANT CHANGE UP (ref 0.5–1.3)
GLUCOSE BLDC GLUCOMTR-MCNC: 217 MG/DL — HIGH (ref 70–99)
GLUCOSE BLDC GLUCOMTR-MCNC: 259 MG/DL — HIGH (ref 70–99)
GLUCOSE BLDC GLUCOMTR-MCNC: 261 MG/DL — HIGH (ref 70–99)
GLUCOSE BLDC GLUCOMTR-MCNC: 270 MG/DL — HIGH (ref 70–99)
GLUCOSE BLDC GLUCOMTR-MCNC: 289 MG/DL — HIGH (ref 70–99)
GLUCOSE SERPL-MCNC: 290 MG/DL — HIGH (ref 70–99)
HCT VFR BLD CALC: 37.2 % — SIGNIFICANT CHANGE UP (ref 34.5–45)
HGB BLD-MCNC: 11.6 G/DL — SIGNIFICANT CHANGE UP (ref 11.5–15.5)
INR BLD: 2.75 RATIO — HIGH (ref 0.88–1.16)
MAGNESIUM SERPL-MCNC: 1.9 MG/DL — SIGNIFICANT CHANGE UP (ref 1.6–2.6)
MCHC RBC-ENTMCNC: 28.6 PG — SIGNIFICANT CHANGE UP (ref 27–34)
MCHC RBC-ENTMCNC: 31.2 GM/DL — LOW (ref 32–36)
MCV RBC AUTO: 91.9 FL — SIGNIFICANT CHANGE UP (ref 80–100)
PHOSPHATE SERPL-MCNC: 2.6 MG/DL — SIGNIFICANT CHANGE UP (ref 2.5–4.5)
PLATELET # BLD AUTO: 173 K/UL — SIGNIFICANT CHANGE UP (ref 150–400)
POTASSIUM SERPL-MCNC: 3.3 MMOL/L — LOW (ref 3.5–5.3)
POTASSIUM SERPL-SCNC: 3.3 MMOL/L — LOW (ref 3.5–5.3)
PROT SERPL-MCNC: 6.7 G/DL — SIGNIFICANT CHANGE UP (ref 6–8.3)
PROTHROM AB SERPL-ACNC: 31.7 SEC — HIGH (ref 10–13.1)
RBC # BLD: 4.05 M/UL — SIGNIFICANT CHANGE UP (ref 3.8–5.2)
RBC # FLD: 15.3 % — HIGH (ref 10.3–14.5)
SODIUM SERPL-SCNC: 140 MMOL/L — SIGNIFICANT CHANGE UP (ref 135–145)
TSH SERPL-MCNC: 0.85 UIU/ML — SIGNIFICANT CHANGE UP (ref 0.27–4.2)
WBC # BLD: 4.13 K/UL — SIGNIFICANT CHANGE UP (ref 3.8–10.5)
WBC # FLD AUTO: 4.13 K/UL — SIGNIFICANT CHANGE UP (ref 3.8–10.5)

## 2018-02-07 RX ORDER — INSULIN LISPRO 100/ML
VIAL (ML) SUBCUTANEOUS AT BEDTIME
Qty: 0 | Refills: 0 | Status: DISCONTINUED | OUTPATIENT
Start: 2018-02-07 | End: 2018-02-16

## 2018-02-07 RX ORDER — POTASSIUM CHLORIDE 20 MEQ
40 PACKET (EA) ORAL ONCE
Qty: 0 | Refills: 0 | Status: COMPLETED | OUTPATIENT
Start: 2018-02-07 | End: 2018-02-07

## 2018-02-07 RX ORDER — WARFARIN SODIUM 2.5 MG/1
2 TABLET ORAL ONCE
Qty: 0 | Refills: 0 | Status: COMPLETED | OUTPATIENT
Start: 2018-02-07 | End: 2018-02-07

## 2018-02-07 RX ADMIN — Medication 1000 UNIT(S): at 12:21

## 2018-02-07 RX ADMIN — Medication 3 MILLILITER(S): at 17:01

## 2018-02-07 RX ADMIN — SODIUM CHLORIDE 50 MILLILITER(S): 9 INJECTION INTRAMUSCULAR; INTRAVENOUS; SUBCUTANEOUS at 05:25

## 2018-02-07 RX ADMIN — Medication 3: at 12:20

## 2018-02-07 RX ADMIN — Medication 3 MILLILITER(S): at 12:21

## 2018-02-07 RX ADMIN — Medication 3: at 08:05

## 2018-02-07 RX ADMIN — Medication 40 MILLIEQUIVALENT(S): at 18:38

## 2018-02-07 RX ADMIN — Medication 3 MILLILITER(S): at 00:40

## 2018-02-07 RX ADMIN — Medication 3 MILLILITER(S): at 05:25

## 2018-02-07 RX ADMIN — UMECLIDINIUM BROMIDE AND VILANTEROL TRIFENATATE 1 PUFF(S): 62.5; 25 POWDER RESPIRATORY (INHALATION) at 10:07

## 2018-02-07 RX ADMIN — Medication 3: at 17:01

## 2018-02-07 RX ADMIN — WARFARIN SODIUM 2 MILLIGRAM(S): 2.5 TABLET ORAL at 22:50

## 2018-02-07 NOTE — PROGRESS NOTE ADULT - SUBJECTIVE AND OBJECTIVE BOX
no fevers last night  spoke to daughter who is concerned about seizures at home    Vital Signs Last 24 Hrs  T(C): 36.3 (2018 04:14), Max: 38.9 (2018 14:46)  T(F): 97.4 (2018 04:14), Max: 102.1 (2018 14:46)  HR: 67 (2018 04:14) (61 - 88)  BP: 120/66 (2018 04:14) (109/67 - 132/72)  BP(mean): --  RR: 18 (2018 04:14) (17 - 18)  SpO2: 98% (2018 04:14) (97% - 98%)    GENERAL: NAD, AAOx3  HEAD:  Atraumatic, Normocephalic  EYES: EOMI  NECK: Supple, No JVD, No LAD  CHEST/LUNG: Clear to auscultation bilaterally  HEART: Regular rate and rhythm; nl S1/S2; No murmurs, rubs, or gallops  ABDOMEN: Soft, Nontender, Nondistended; Bowel sounds present  EXTREMITIES:  2+ Peripheral Pulses, 2+ b/l LE edema  SKIN: No rashes or lesions  NEURO: nonfocal CN/motor/sensory/reflexes    LABS:                        11.6   4.13  )-----------( 173      ( 2018 07:34 )             37.2     02-06    141  |  108  |  15  ----------------------------<  144<H>  3.5   |  22  |  0.94    Ca    8.3<L>      2018 09:01  Phos  1.7     02-06  Mg     1.8     02-06      PT/INR - ( 2018 07:34 )   PT: 31.7 sec;   INR: 2.75 ratio         PTT - ( 2018 07:34 )  PTT:45.2 sec  CAPILLARY BLOOD GLUCOSE      POCT Blood Glucose.: 289 mg/dL (2018 07:36)  POCT Blood Glucose.: 261 mg/dL (2018 03:58)  POCT Blood Glucose.: 266 mg/dL (2018 23:04)  POCT Blood Glucose.: 284 mg/dL (2018 22:13)  POCT Blood Glucose.: 177 mg/dL (2018 16:55)  POCT Blood Glucose.: 126 mg/dL (2018 11:53)        Urinalysis Basic - ( 2018 07:07 )    Color: Yellow / Appearance: Clear / S.017 / pH: x  Gluc: x / Ketone: Negative  / Bili: Negative / Urobili: Negative mg/dL   Blood: x / Protein: 30 mg/dL / Nitrite: Negative   Leuk Esterase: Negative / RBC: 6 /HPF / WBC 1 /HPF   Sq Epi: x / Non Sq Epi: 2 /HPF / Bacteria: Negative

## 2018-02-07 NOTE — PROGRESS NOTE ADULT - PROBLEM SELECTOR PLAN 1
Questionable generalized shaking activity x 2 at home before she came to hospital  ordered EEG  will touch base with Dr. Rosenstein

## 2018-02-07 NOTE — PROGRESS NOTE ADULT - PROBLEM SELECTOR PLAN 1
so far no focal infectious etiology isolated as a reason for the fever  blood cx neg, rvp neg urine cx neg and ua is negative  echo no vegetations noted  urinary retention now with mcadams  fever curve seems to be decreasing  she is off antibx now. and cont to monitor  CT with contrast does not show pathology that would be cause of fever  DVT can give fever, but seems that would be resolving by now.  has small b/l pleural effusions doubt significant  encourage oob  follow temp curve  would encourage oob  check sed rate and crp

## 2018-02-07 NOTE — PROGRESS NOTE ADULT - SUBJECTIVE AND OBJECTIVE BOX
[facility], Division of Infectious Diseases  MAYA Pyle A. Lee  [phone]  Name: MARU VILLASEÑOR  Age: 82y  Gender: Female  MRN: [id number]    Interval History--  Notes reviewed  Pt wants to get out of here.  She denies cough, mcadams catheter is still in.  no diarrhea    Past Medical History--  Breast cancer  Lung cancer, left  Diabetes mellitus  Afib  S/P colectomy  Status post left hip replacement  S/P lobectomy of lung      For details regarding the patient's social history, family history, and other miscellaneous elements, please refer the initial infectious diseases consultation and/or the admitting history and physical examination for this admission.    Allergies    codeine (Fever; Rash)  contrast media (iodine-based) (Other)    Intolerances        Medications--  Antibiotics:    Immunologic:    Other:  acetaminophen   Tablet PRN  ALBUTerol/ipratropium for Nebulization  cholecalciferol  dextrose 5%.  insulin lispro (HumaLOG) corrective regimen sliding scale  insulin lispro (HumaLOG) corrective regimen sliding scale  sodium chloride 0.9%.  umeclidinium 62.5 MICROgram(s)/vilanterol 25 MICROgram(s) Inhaler  warfarin      Review of Systems--  A 10-point review of systems was obtained.     Pertinent positives and negatives--  Constitutional: + fevers. No Chills. No Rigors.   Cardiovascular: No chest pain. No palpitations.  Respiratory: No shortness of breath. No cough.  Gastrointestinal: No nausea or vomiting. No diarrhea or constipation.   Psychiatric: Pleasant. Appropriate affect.    Review of systems otherwise negative except as previously noted.    Physical Examination--  Vital Signs: T(F): 97.4 (02-07-18 @ 11:24), Max: 102.1 (02-06-18 @ 14:46)  HR: 71 (02-07-18 @ 11:24)  BP: 145/72 (02-07-18 @ 11:24)  RR: 18 (02-07-18 @ 11:24)  SpO2: 96% (02-07-18 @ 11:24)  Wt(kg): --  General: Nontoxic-appearing Female in no acute distress.  HEENT: AT/NC. Anicteric. Conjunctiva pink and moist. Oropharynx clear. Dentition fair.  Neck: Not rigid. No sense of mass.  Nodes: None palpable.  Lungs: Clear bilaterally without rales, wheezing or rhonchi  Heart: Regular rate and rhythm. No Murmur. No rub. No gallop. No palpable thrill.  Abdomen: Bowel sounds present and normoactive. Soft. Nondistended. Nontender. .  Back: No spinal tenderness. No costovertebral angle tenderness.    mcadams  Extremities: No cyanosis or clubbing. ++ edema.   Skin: Warm. Dry. Good turgor. No rash. No vasculitic stigmata.  Psychiatric: Appropriate affect and mood for situation.         Laboratory Studies--  CBC                        11.6   4.13  )-----------( 173      ( 07 Feb 2018 07:34 )             37.2       Chemistries  02-07    140  |  108  |  15  ----------------------------<  290<H>  3.3<L>   |  20<L>  |  0.95    Ca    8.1<L>      07 Feb 2018 07:39  Phos  2.6     02-07  Mg     1.9     02-07    TPro  6.7  /  Alb  2.6<L>  /  TBili  <0.2  /  DBili  x   /  AST  25  /  ALT  31  /  AlkPhos  66  02-07      Culture Data    Culture - Urine (02.05.18 @ 14:23)    Specimen Source: .Urine Clean Catch (Midstream)    Culture Results:   No growth        Culture - Blood (02.04.18 @ 05:55)    Specimen Source: .Blood Blood-Peripheral    Culture Results:   No growth to date.      Culture - Urine (02.04.18 @ 05:11)    Specimen Source: .Urine Catheterized    Culture Results:   No growth        < from: Xray Chest 1 View AP/PA (02.03.18 @ 23:28) >  XAM:  XR CHEST AP OR PA 1V                            PROCEDURE DATE:  02/03/2018            INTERPRETATION:  EXAMINATION: XR CHEST AP OR PA 1V    CLINICAL INDICATION: syncope    TECHNIQUE: Single portable view of the chest was obtained.    COMPARISON: 1/19/2018.    FINDINGS:     The heart is normal in size. The aorta is atherosclerotic. There are no   focal pulmonary consolidations or pneumothorax. There are postsurgical   changes in the left hemithorax, consistent with prior history of   lobectomy. There is stable blunting of the left costophrenic angle.    IMPRESSION:   No focal consolidation. Postsurgical changes. Stable blunting of the left   cardiophrenic angle.    < end of copied text >  < from: Xray Chest 1 View AP/PA (02.03.18 @ 23:28) >  XAM:  XR CHEST AP OR PA 1V                          < from: CT Chest w/ IV Cont (02.06.18 @ 21:26) >  XAM:  CT CHEST IC                          EXAM:  CT ABDOMEN AND PELVIS OC IC                            PROCEDURE DATE:  02/06/2018            INTERPRETATION:  CLINICAL INFORMATION: Fever of unknown origin, syncope.    COMPARISON: CT chest abdomen and pelvis dated 1/20/2018.    PROCEDURE:   CT of the Chest, Abdomen and Pelvis was performed with intravenous   contrast.   Intravenous contrast: 90 ml Omnipaque 350. 10 ml discarded.  Oral contrast: positive contrast was administered.  Sagittal and coronal reformats were performed.    FINDINGS:    CHEST:     LUNGS AND LARGE AIRWAYS: Interval development of small bilateral pleural   effusions, right greater than left, associated with subsegmental   atelectasis. Centrilobular emphysematous changes. Left lower lobectomy.   PLEURA: As above.  VESSELS: Atherosclerotic vascular calcifications  HEART: Heart size is normal. No pericardial effusion.  MEDIASTINUM AND LETICIA: Mildly prominent nonspecific mediastinal and right   hilar lymph nodes up to 1 cm in short axis dimension.  CHEST WALL AND LOWER NECK: Within normal limits.    ABDOMEN AND PELVIS:    LIVER: Within normal limits.  BILE DUCTS: Normal caliber.  GALLBLADDER: Within normal limits.  SPLEEN: Within normal limits.  PANCREAS: Within normal limits.  ADRENALS: Within normal limits.  KIDNEYS/URETERS: Bilateral renal cysts. Right interpolar renal cortical   scarring. No bilateral renal hydronephrosis.    BLADDER: Small amount of air within the urinary bladder, likely secondary   to Mcadams balloon catheter placement. Bladder is partially obscured due to   streak artifact from the left hip hardware.  REPRODUCTIVE ORGANS: The uterus is unremarkable. Right ovarian cyst   measures 1.7 x 1.8 cm. Left adnexa is unremarkable.    BOWEL: Status post partial right colonic resection with intact   anastomosis. Thickening of the rectal wall, associated with moderate   amount of presacral fluid and edema, concerning for proctitis. Extensive   diverticulosis without evidence of diverticulitis. No bowel obstruction.   PERITONEUM: Mild mesenteric edema. Presacral fluid and edema as above.  VESSELS:  Atherosclerotic vascular calcifications.  RETROPERITONEUM:  Nonspecific subcentimeter retroperitoneal lymph nodes.   Pelvic sidewall lymph nodes up to 1.2 cm in short axis.  ABDOMINAL WALL: Within normal limits.  BONES: Status post left total hip arthroplasty. Degenerative changes and   scoliosis of the visualized spine.    IMPRESSION:      Suspicious for proctitis.    Interval development of small bilateral pleural effusions, right greater   than left, associated with subsegmental atelectasis. Centrilobular   emphysema.      < end of copied text >

## 2018-02-08 DIAGNOSIS — E83.39 OTHER DISORDERS OF PHOSPHORUS METABOLISM: ICD-10-CM

## 2018-02-08 DIAGNOSIS — I50.32 CHRONIC DIASTOLIC (CONGESTIVE) HEART FAILURE: ICD-10-CM

## 2018-02-08 LAB
ANION GAP SERPL CALC-SCNC: 9 MMOL/L — SIGNIFICANT CHANGE UP (ref 5–17)
BUN SERPL-MCNC: 18 MG/DL — SIGNIFICANT CHANGE UP (ref 7–23)
CALCIUM SERPL-MCNC: 8.1 MG/DL — LOW (ref 8.4–10.5)
CHLORIDE SERPL-SCNC: 113 MMOL/L — HIGH (ref 96–108)
CO2 SERPL-SCNC: 20 MMOL/L — LOW (ref 22–31)
CREAT SERPL-MCNC: 0.96 MG/DL — SIGNIFICANT CHANGE UP (ref 0.5–1.3)
CRP SERPL-MCNC: 7.2 MG/DL — HIGH (ref 0–0.4)
ERYTHROCYTE [SEDIMENTATION RATE] IN BLOOD: 43 MM/HR — HIGH (ref 0–20)
GLUCOSE BLDC GLUCOMTR-MCNC: 126 MG/DL — HIGH (ref 70–99)
GLUCOSE BLDC GLUCOMTR-MCNC: 159 MG/DL — HIGH (ref 70–99)
GLUCOSE BLDC GLUCOMTR-MCNC: 171 MG/DL — HIGH (ref 70–99)
GLUCOSE BLDC GLUCOMTR-MCNC: 195 MG/DL — HIGH (ref 70–99)
GLUCOSE SERPL-MCNC: 180 MG/DL — HIGH (ref 70–99)
HCT VFR BLD CALC: 33 % — LOW (ref 34.5–45)
HGB BLD-MCNC: 10.3 G/DL — LOW (ref 11.5–15.5)
INR BLD: 2.47 RATIO — HIGH (ref 0.88–1.16)
MCHC RBC-ENTMCNC: 28.2 PG — SIGNIFICANT CHANGE UP (ref 27–34)
MCHC RBC-ENTMCNC: 31.2 GM/DL — LOW (ref 32–36)
MCV RBC AUTO: 90.4 FL — SIGNIFICANT CHANGE UP (ref 80–100)
PLATELET # BLD AUTO: 202 K/UL — SIGNIFICANT CHANGE UP (ref 150–400)
POTASSIUM SERPL-MCNC: 4.6 MMOL/L — SIGNIFICANT CHANGE UP (ref 3.5–5.3)
POTASSIUM SERPL-SCNC: 4.6 MMOL/L — SIGNIFICANT CHANGE UP (ref 3.5–5.3)
PROTHROM AB SERPL-ACNC: 28.4 SEC — HIGH (ref 10–13.1)
RBC # BLD: 3.65 M/UL — LOW (ref 3.8–5.2)
RBC # FLD: 15.2 % — HIGH (ref 10.3–14.5)
SODIUM SERPL-SCNC: 142 MMOL/L — SIGNIFICANT CHANGE UP (ref 135–145)
WBC # BLD: 6.86 K/UL — SIGNIFICANT CHANGE UP (ref 3.8–10.5)
WBC # FLD AUTO: 6.86 K/UL — SIGNIFICANT CHANGE UP (ref 3.8–10.5)

## 2018-02-08 PROCEDURE — 95819 EEG AWAKE AND ASLEEP: CPT | Mod: 26

## 2018-02-08 RX ORDER — FUROSEMIDE 40 MG
40 TABLET ORAL DAILY
Qty: 0 | Refills: 0 | Status: DISCONTINUED | OUTPATIENT
Start: 2018-02-08 | End: 2018-02-09

## 2018-02-08 RX ORDER — WARFARIN SODIUM 2.5 MG/1
4 TABLET ORAL ONCE
Qty: 0 | Refills: 0 | Status: COMPLETED | OUTPATIENT
Start: 2018-02-08 | End: 2018-02-08

## 2018-02-08 RX ADMIN — Medication 40 MILLIGRAM(S): at 09:39

## 2018-02-08 RX ADMIN — Medication 3 MILLILITER(S): at 12:23

## 2018-02-08 RX ADMIN — Medication 1: at 17:25

## 2018-02-08 RX ADMIN — UMECLIDINIUM BROMIDE AND VILANTEROL TRIFENATATE 1 PUFF(S): 62.5; 25 POWDER RESPIRATORY (INHALATION) at 08:13

## 2018-02-08 RX ADMIN — Medication 1: at 08:09

## 2018-02-08 RX ADMIN — WARFARIN SODIUM 4 MILLIGRAM(S): 2.5 TABLET ORAL at 22:11

## 2018-02-08 RX ADMIN — Medication 1: at 12:22

## 2018-02-08 RX ADMIN — Medication 3 MILLILITER(S): at 06:26

## 2018-02-08 RX ADMIN — Medication 1000 UNIT(S): at 12:23

## 2018-02-08 NOTE — PROGRESS NOTE ADULT - PROBLEM SELECTOR PLAN 1
now afebrile > 24 hours  so far no focal infectious etiology isolated as a reason for the fever  blood cx neg, rvp neg urine cx neg and ua is negative  echo no vegetations noted  urinary retention now with mcadams  she is off antibx now. and cont to monitor  CT with contrast does not show pathology that would be cause of fever  DVT can give fever, but seems that would be resolving by now.  has small b/l pleural effusions doubt significant  encourage oob  follow temp curve  would encourage oob  sed rate mildly elevated

## 2018-02-08 NOTE — PROGRESS NOTE ADULT - PROBLEM SELECTOR PLAN 1
Questionable generalized shaking activity x 2 at home before she came to hospital  EEG pending  Dr. Rosenstein group notified, will see pt today

## 2018-02-08 NOTE — PROGRESS NOTE ADULT - SUBJECTIVE AND OBJECTIVE BOX
[facility], Division of Infectious Diseases  MAYA Pyle A. Lee  [phone]  Name: MARU VILLASEÑOR  Age: 82y  Gender: Female  MRN: [id number]    Interval History--  Notes reviewed  feels well    Past Medical History--  Breast cancer  Lung cancer, left  Diabetes mellitus  Afib  S/P colectomy  Status post left hip replacement  S/P lobectomy of lung      For details regarding the patient's social history, family history, and other miscellaneous elements, please refer the initial infectious diseases consultation and/or the admitting history and physical examination for this admission.    Allergies    codeine (Fever; Rash)  contrast media (iodine-based) (Other)    Intolerances        Medications--  Antibiotics:    Immunologic:    Other:  acetaminophen   Tablet PRN  ALBUTerol/ipratropium for Nebulization  cholecalciferol  dextrose 5%.  furosemide   Injectable  insulin lispro (HumaLOG) corrective regimen sliding scale  insulin lispro (HumaLOG) corrective regimen sliding scale  sodium chloride 0.9%.  umeclidinium 62.5 MICROgram(s)/vilanterol 25 MICROgram(s) Inhaler  warfarin      Review of Systems--  A 10-point review of systems was obtained.     Pertinent positives and negatives--  Constitutional: No fevers. No Chills. No Rigors.   Cardiovascular: No chest pain. No palpitations.  Respiratory: No shortness of breath. No cough.  Gastrointestinal: No nausea or vomiting. No diarrhea or constipation.   Psychiatric: denies anxiety    Review of systems otherwise negative except as previously noted.    Physical Examination--  Vital Signs: T(F): 97.8 (02-08-18 @ 12:27), Max: 98.3 (02-07-18 @ 21:16)  HR: 58 (02-08-18 @ 12:27)  BP: 112/65 (02-08-18 @ 12:27)  RR: 18 (02-08-18 @ 12:27)  SpO2: 98% (02-08-18 @ 12:27)  Wt(kg): --  General: Nontoxic-appearing Female in no acute distress.  HEENT: AT/NC. Anicteric. Conjunctiva pink and moist. Oropharynx clear.  Neck: Not rigid. No sense of mass  Nodes: None palpable.  Lungs: Clear bilaterally without rales, wheezing or rhonchi  Heart: Regular rate and rhythm. No Murmur. No rub. No gallop. No palpable thrill.  Abdomen: Bowel sounds present and normoactive. Soft. Nondistended. Nontender.  Back: No spinal tenderness. No costovertebral angle tenderness.   Extremities: No cyanosis or clubbing. No edema.   Skin: Warm. Dry. Good turgor. No rash. No vasculitic stigmata.  Psychiatric: Appropriate affect and mood for situation.         Laboratory Studies--  CBC                        10.3   6.86  )-----------( 202      ( 08 Feb 2018 07:34 )             33.0       Chemistries  02-08    142  |  113<H>  |  18  ----------------------------<  180<H>  4.6   |  20<L>  |  0.96    Ca    8.1<L>      08 Feb 2018 07:43  Phos  2.6     02-07  Mg     1.9     02-07    TPro  6.7  /  Alb  2.6<L>  /  TBili  <0.2  /  DBili  x   /  AST  25  /  ALT  31  /  AlkPhos  66  02-07      Culture Data  Culture - Urine (02.05.18 @ 14:23)    Specimen Source: .Urine Clean Catch (Midstream)    Culture Results:   No growth          Culture - Blood (02.04.18 @ 05:55)    Specimen Source: .Blood Blood-Peripheral    Culture Results:   No growth to date.      Culture - Blood (02.04.18 @ 05:55)    Specimen Source: .Blood Blood-Peripheral    Culture Results:   No growth to date.

## 2018-02-08 NOTE — PROGRESS NOTE ADULT - SUBJECTIVE AND OBJECTIVE BOX
Denies SOB, CP    Vital Signs Last 24 Hrs  T(C): 36.3 (08 Feb 2018 06:28), Max: 36.8 (07 Feb 2018 21:16)  T(F): 97.4 (08 Feb 2018 06:28), Max: 98.3 (07 Feb 2018 21:16)  HR: 71 (08 Feb 2018 09:38) (46 - 71)  BP: 113/64 (08 Feb 2018 09:38) (113/64 - 145/72)  BP(mean): --  RR: 18 (08 Feb 2018 06:28) (17 - 18)  SpO2: 97% (08 Feb 2018 06:28) (96% - 98%)    GENERAL: NAD, AAOx3  HEAD:  Atraumatic, Normocephalic  EYES: EOMI  NECK: Supple, No JVD, No LAD  CHEST/LUNG: Clear to auscultation bilaterally  HEART: Regular rate and rhythm; nl S1/S2; No murmurs, rubs, or gallops  ABDOMEN: Soft, Nontender, Nondistended; Bowel sounds present  EXTREMITIES:  2+ Peripheral Pulses, 2+ b/l LE edema  SKIN: No rashes or lesions  NEURO: nonfocal CN/motor/sensory/reflexes    LABS:                        10.3   6.86  )-----------( 202      ( 08 Feb 2018 07:34 )             33.0     02-08    142  |  113<H>  |  18  ----------------------------<  180<H>  4.6   |  20<L>  |  0.96    Ca    8.1<L>      08 Feb 2018 07:43  Phos  2.6     02-07  Mg     1.9     02-07    TPro  6.7  /  Alb  2.6<L>  /  TBili  <0.2  /  DBili  x   /  AST  25  /  ALT  31  /  AlkPhos  66  02-07    PT/INR - ( 08 Feb 2018 07:35 )   PT: 28.4 sec;   INR: 2.47 ratio         PTT - ( 07 Feb 2018 07:34 )  PTT:45.2 sec  CAPILLARY BLOOD GLUCOSE      POCT Blood Glucose.: 171 mg/dL (08 Feb 2018 07:30)  POCT Blood Glucose.: 217 mg/dL (07 Feb 2018 21:19)  POCT Blood Glucose.: 259 mg/dL (07 Feb 2018 16:36)  POCT Blood Glucose.: 270 mg/dL (07 Feb 2018 11:28)

## 2018-02-08 NOTE — CONSULT NOTE ADULT - ASSESSMENT
a/p - 83 yo F with lung, breast CA and melonoma who had LOC in 2017 that workup done in my office was nL.  In setting of fevers found in ER, at home had witness LOC.  She is now back to baseline. Exam nonfocal except ppor historian.  The issue is question of sz, the lack of postictal state, b/b incontinece or tongue biting make seizure less likely.  Even if did, was in the setting of fever, would be characterized as provoked and does in itself not require treatment.  CT head negative  d/w NP routine EEG is sufficient, unlikely to change managment  If EEG neg can d/c but also can d/c with EEG as outpt  will follow

## 2018-02-08 NOTE — CONSULT NOTE ADULT - SUBJECTIVE AND OBJECTIVE BOX
Admitting Diagnosis:  Urinary tract infection (N39.0): URINARY TRACT INFECTION, SITE NOT SPECIFIED      HPI:  This is a 82y year old Female with the below past medical history who presents with the chief complaint of LOC.  On saturday am, son noted sleepy and lethargic.  Later that evening, not feeling well, when got up to go to toilet, got unreponsive, no shaking, no stiffness, placed on bed, responsive after two minutes, no confusion but lethargic.  Occured again when got up to go to bathroom.  In ED, found to be febrile but no LOC.  She has  history of lung CA with past LEFT lower lobectomy for lung CA presumed to be in remission, COPD, malignant melanoma, breast CA with past tylectomy presumed to be disease free, type 2 DM but not on current treatment, past RIGHT DVT on Coumadin with recent discharge from [place] in January 2018 for a LEFT leg DVT with patient resumed on Coumadin per patient's preference.     Pt seen for LOC in 2017 by my associate Dr. Rosenstein had EEG was nL, MRI brain no stroke  Hx of HPI taken by the daughter    Past Medical History:  Breast cancer (C50.919)  Lung cancer, left (162.9)  Diabetes mellitus (250.00): diet controlled diabetes  Afib (427.31)      Past Surgical History:  S/P colectomy (V45.89)  Status post left hip replacement (V43.64)  S/P lobectomy of lung (V45.89): s/p LLL Lobectomy 6/2014 for lung CA      Social History:  No toxic habits    Family History:  FAMILY HISTORY:  Family history of lung cancer (Sibling)  Family history of secondary cancer of bone and bone marrow  Family history of dementia      Allergies:  codeine (Fever; Rash)  contrast media (iodine-based) (Other)      ROS:  Constitutional: Patient offers no complaints of fevers or significant weight loss  Ears, Nose, Mouth and Throat: The patient presents with no abnormalities of the head, ears, eyes, nose or throat  Skin: Patient offers no concerns of new rashes or lesions  Respiratory: The patient presents with no abnormalities of the respiratory tract  Cardiovascular: The patient presents with no cardiac abnormalities  Gastrointestinal: The patient presents with no abnormalities of the GI system  Genitourinary: The patient presents with no dysuria, hematuria or frequent urination  Neurological: See HPI  Endocrine: Patient offers no complaints of excessive thirst, urination, or heat/cold intolerance    Advanced care planning reviewed and noted in the chart.    Medications:  acetaminophen   Tablet 650 milliGRAM(s) Oral every 6 hours PRN  ALBUTerol/ipratropium for Nebulization 3 milliLiter(s) Nebulizer every 6 hours  cholecalciferol 1000 Unit(s) Oral daily  dextrose 5%. 1000 milliLiter(s) IV Continuous <Continuous>  furosemide   Injectable 40 milliGRAM(s) IV Push daily  insulin lispro (HumaLOG) corrective regimen sliding scale   SubCutaneous at bedtime  insulin lispro (HumaLOG) corrective regimen sliding scale   SubCutaneous three times a day before meals  sodium chloride 0.9%. 1000 milliLiter(s) IV Continuous <Continuous>  umeclidinium 62.5 MICROgram(s)/vilanterol 25 MICROgram(s) Inhaler 1 Puff(s) Inhalation daily  warfarin 4 milliGRAM(s) Oral once      Labs:  CBC Full  -  ( 08 Feb 2018 07:34 )  WBC Count : 6.86 K/uL  Hemoglobin : 10.3 g/dL  Hematocrit : 33.0 %  Platelet Count - Automated : 202 K/uL  Mean Cell Volume : 90.4 fl  Mean Cell Hemoglobin : 28.2 pg  Mean Cell Hemoglobin Concentration : 31.2 gm/dL  Auto Neutrophil # : x  Auto Lymphocyte # : x  Auto Monocyte # : x  Auto Eosinophil # : x  Auto Basophil # : x  Auto Neutrophil % : x  Auto Lymphocyte % : x  Auto Monocyte % : x  Auto Eosinophil % : x  Auto Basophil % : x    02-08    142  |  113<H>  |  18  ----------------------------<  180<H>  4.6   |  20<L>  |  0.96    Ca    8.1<L>      08 Feb 2018 07:43  Phos  2.6     02-07  Mg     1.9     02-07    TPro  6.7  /  Alb  2.6<L>  /  TBili  <0.2  /  DBili  x   /  AST  25  /  ALT  31  /  AlkPhos  66  02-07    CAPILLARY BLOOD GLUCOSE      POCT Blood Glucose.: 171 mg/dL (08 Feb 2018 07:30)  POCT Blood Glucose.: 217 mg/dL (07 Feb 2018 21:19)  POCT Blood Glucose.: 259 mg/dL (07 Feb 2018 16:36)  POCT Blood Glucose.: 270 mg/dL (07 Feb 2018 11:28)    LIVER FUNCTIONS - ( 07 Feb 2018 07:39 )  Alb: 2.6 g/dL / Pro: 6.7 g/dL / ALK PHOS: 66 U/L / ALT: 31 U/L / AST: 25 U/L / GGT: x           PT/INR - ( 08 Feb 2018 07:35 )   PT: 28.4 sec;   INR: 2.47 ratio         PTT - ( 07 Feb 2018 07:34 )  PTT:45.2 sec    Female    Vitals:  Vital Signs Last 24 Hrs  T(C): 36.3 (08 Feb 2018 06:28), Max: 36.8 (07 Feb 2018 21:16)  T(F): 97.4 (08 Feb 2018 06:28), Max: 98.3 (07 Feb 2018 21:16)  HR: 71 (08 Feb 2018 09:38) (46 - 71)  BP: 113/64 (08 Feb 2018 09:38) (113/64 - 145/72)  BP(mean): --  RR: 18 (08 Feb 2018 06:28) (17 - 18)  SpO2: 97% (08 Feb 2018 06:28) (96% - 98%)    NEUROLOGICAL EXAM:    Mental status: Awake, alert, and in no apparent distress. Oriented to person, place and time. Language function is normal. not good historian    Cranial Nerves: Pupils were equal, round, reactive to light. Extraocular movements were intact. Visual field were full. Fundoscopic exam was deferred. Facial sensation was intact to light touch. There was no facial asymmetry. The palate was upgoing symmetrically and tongue was midline. Hearing acuity was intact to finger rub AU. Shoulder shrug was full bilaterally    Motor exam: Bulk and tone were normal. Strength was 5/5 in all four extremities. Fine finger movements were symmetric and normal. There was no pronator drift    Reflexes: 2+ in the bilateral upper extremities. 2+ in the bilateral lower extremities. Toes were downgoing bilaterally.     Sensation: Intact to light touch, temperature, vibration and proprioception.     Coordination: Finger-nose-finger and heel-to-shin was without dysmetria.     Gait: defered    < from: CT Head No Cont (02.04.18 @ 00:21) >    EXAM:  CT BRAIN                            PROCEDURE DATE:  02/04/2018            INTERPRETATION:  CLINICAL INFORMATION: Head trauma. Left leg weakness.    TECHNIQUE: Noncontrast axial CT images were acquired through the head.   Two-dimensional sagittal and coronal reformats were generated.    COMPARISON STUDY: CT head dated April 3, 2017    FINDINGS:     There is no CT evidence of acute intracranial hemorrhage or extra-axial   collection.    There is no CT evidence of an acute demarcated territorial infarct.    There are confluent periventricular white matter hypodensities compatible   with chronic microvascular-type change.     There is no vasogenic edema or mass effect.    The ventricles and sulci are normal in size and configuration forthe   patient's age.    The basal cisterns are patent.    There is mucosal disease in the left maxillary sinus. The mastoid air   cells and middle ear cavities are grossly clear.    The soft tissues of the scalp and face are unremarkable.    The bones of the calvarium, skull base, and face are unremarkable.    IMPRESSION:     No CT evidence of acute intracranial hemorrhage or mass effect.    No CT evidence of acute demarcated territorial infarct.     If the patient's symptoms persist, consider short interval follow-up head   CT or brain MRI if there are no MRI contraindications.                ALIRIO LINDSEY M.D., RADIOLOGY RESIDENT  This document has been electronically signed.  DOE LR M.D., ATTENDING RADIOLOGIST  This document has been electronically signed. Feb 4 2018 12:57AM                < end of copied text >

## 2018-02-09 LAB
ANION GAP SERPL CALC-SCNC: 12 MMOL/L — SIGNIFICANT CHANGE UP (ref 5–17)
BUN SERPL-MCNC: 26 MG/DL — HIGH (ref 7–23)
CALCIUM SERPL-MCNC: 8.6 MG/DL — SIGNIFICANT CHANGE UP (ref 8.4–10.5)
CHLORIDE SERPL-SCNC: 102 MMOL/L — SIGNIFICANT CHANGE UP (ref 96–108)
CO2 SERPL-SCNC: 25 MMOL/L — SIGNIFICANT CHANGE UP (ref 22–31)
CREAT SERPL-MCNC: 0.95 MG/DL — SIGNIFICANT CHANGE UP (ref 0.5–1.3)
CULTURE RESULTS: SIGNIFICANT CHANGE UP
CULTURE RESULTS: SIGNIFICANT CHANGE UP
GLUCOSE BLDC GLUCOMTR-MCNC: 153 MG/DL — HIGH (ref 70–99)
GLUCOSE BLDC GLUCOMTR-MCNC: 200 MG/DL — HIGH (ref 70–99)
GLUCOSE BLDC GLUCOMTR-MCNC: 223 MG/DL — HIGH (ref 70–99)
GLUCOSE BLDC GLUCOMTR-MCNC: 268 MG/DL — HIGH (ref 70–99)
GLUCOSE SERPL-MCNC: 145 MG/DL — HIGH (ref 70–99)
HCT VFR BLD CALC: 38.2 % — SIGNIFICANT CHANGE UP (ref 34.5–45)
HGB BLD-MCNC: 12.4 G/DL — SIGNIFICANT CHANGE UP (ref 11.5–15.5)
INR BLD: 2.51 RATIO — HIGH (ref 0.88–1.16)
MCHC RBC-ENTMCNC: 29.7 PG — SIGNIFICANT CHANGE UP (ref 27–34)
MCHC RBC-ENTMCNC: 32.5 GM/DL — SIGNIFICANT CHANGE UP (ref 32–36)
MCV RBC AUTO: 91.4 FL — SIGNIFICANT CHANGE UP (ref 80–100)
PLATELET # BLD AUTO: 248 K/UL — SIGNIFICANT CHANGE UP (ref 150–400)
POTASSIUM SERPL-MCNC: 3.7 MMOL/L — SIGNIFICANT CHANGE UP (ref 3.5–5.3)
POTASSIUM SERPL-SCNC: 3.7 MMOL/L — SIGNIFICANT CHANGE UP (ref 3.5–5.3)
PROTHROM AB SERPL-ACNC: 28.9 SEC — HIGH (ref 10–13.1)
RBC # BLD: 4.18 M/UL — SIGNIFICANT CHANGE UP (ref 3.8–5.2)
RBC # FLD: 15 % — HIGH (ref 10.3–14.5)
SODIUM SERPL-SCNC: 139 MMOL/L — SIGNIFICANT CHANGE UP (ref 135–145)
SPECIMEN SOURCE: SIGNIFICANT CHANGE UP
SPECIMEN SOURCE: SIGNIFICANT CHANGE UP
WBC # BLD: 8.2 K/UL — SIGNIFICANT CHANGE UP (ref 3.8–10.5)
WBC # FLD AUTO: 8.2 K/UL — SIGNIFICANT CHANGE UP (ref 3.8–10.5)

## 2018-02-09 RX ORDER — WARFARIN SODIUM 2.5 MG/1
5 TABLET ORAL ONCE
Qty: 0 | Refills: 0 | Status: COMPLETED | OUTPATIENT
Start: 2018-02-09 | End: 2018-02-09

## 2018-02-09 RX ADMIN — Medication 3 MILLILITER(S): at 12:42

## 2018-02-09 RX ADMIN — Medication 3: at 17:22

## 2018-02-09 RX ADMIN — Medication 1000 UNIT(S): at 12:42

## 2018-02-09 RX ADMIN — UMECLIDINIUM BROMIDE AND VILANTEROL TRIFENATATE 1 PUFF(S): 62.5; 25 POWDER RESPIRATORY (INHALATION) at 08:45

## 2018-02-09 RX ADMIN — Medication 650 MILLIGRAM(S): at 23:06

## 2018-02-09 RX ADMIN — Medication 1: at 12:42

## 2018-02-09 RX ADMIN — Medication 1: at 08:42

## 2018-02-09 RX ADMIN — WARFARIN SODIUM 5 MILLIGRAM(S): 2.5 TABLET ORAL at 23:06

## 2018-02-09 RX ADMIN — Medication 3 MILLILITER(S): at 17:23

## 2018-02-09 RX ADMIN — Medication 40 MILLIGRAM(S): at 06:28

## 2018-02-09 RX ADMIN — Medication 3 MILLILITER(S): at 23:06

## 2018-02-09 NOTE — PROGRESS NOTE ADULT - SUBJECTIVE AND OBJECTIVE BOX
Admitting Diagnosis:  Urinary tract infection (N39.0): URINARY TRACT INFECTION, SITE NOT SPECIFIED      HPI:  This is a 82y year old Female with the below past medical history who presents with the chief complaint of LOC.  On saturday am, son noted sleepy and lethargic.  Later that evening, not feeling well, when got up to go to toilet, got unreponsive, no shaking, no stiffness, placed on bed, responsive after two minutes, no confusion but lethargic.  Occured again when got up to go to bathroom.  In ED, found to be febrile but no LOC.  She has  history of lung CA with past LEFT lower lobectomy for lung CA presumed to be in remission, COPD, malignant melanoma, breast CA with past tylectomy presumed to be disease free, type 2 DM but not on current treatment, past RIGHT DVT on Coumadin with recent discharge from [place] in January 2018 for a LEFT leg DVT with patient resumed on Coumadin per patient's preference.     Pt seen for LOC in 2017 by my associate Dr. Rosenstein had EEG was nL, MRI brain no stroke  Hx of HPI taken by the daughter    Pt sys feels better, wants to go home      Past Medical History:  Breast cancer (C50.919)  Lung cancer, left (162.9)  Diabetes mellitus (250.00): diet controlled diabetes  Afib (427.31)      Past Surgical History:  S/P colectomy (V45.89)  Status post left hip replacement (V43.64)  S/P lobectomy of lung (V45.89): s/p LLL Lobectomy 6/2014 for lung CA      Social History:  No toxic habits    Family History:  FAMILY HISTORY:  Family history of lung cancer (Sibling)  Family history of secondary cancer of bone and bone marrow  Family history of dementia      Allergies:  codeine (Fever; Rash)  contrast media (iodine-based) (Other)      ROS:  Constitutional: Patient offers no complaints of fevers or significant weight loss  Ears, Nose, Mouth and Throat: The patient presents with no abnormalities of the head, ears, eyes, nose or throat  Skin: Patient offers no concerns of new rashes or lesions  Respiratory: The patient presents with no abnormalities of the respiratory tract  Cardiovascular: The patient presents with no cardiac abnormalities  Gastrointestinal: The patient presents with no abnormalities of the GI system  Genitourinary: The patient presents with no dysuria, hematuria or frequent urination  Neurological: See HPI  Endocrine: Patient offers no complaints of excessive thirst, urination, or heat/cold intolerance    Advanced care planning reviewed and noted in the chart.      Medications:  acetaminophen   Tablet 650 milliGRAM(s) Oral every 6 hours PRN  ALBUTerol/ipratropium for Nebulization 3 milliLiter(s) Nebulizer every 6 hours  cholecalciferol 1000 Unit(s) Oral daily  dextrose 5%. 1000 milliLiter(s) IV Continuous <Continuous>  insulin lispro (HumaLOG) corrective regimen sliding scale   SubCutaneous at bedtime  insulin lispro (HumaLOG) corrective regimen sliding scale   SubCutaneous three times a day before meals  umeclidinium 62.5 MICROgram(s)/vilanterol 25 MICROgram(s) Inhaler 1 Puff(s) Inhalation daily      Labs:  CBC Full  -  ( 08 Feb 2018 07:34 )  WBC Count : 6.86 K/uL  Hemoglobin : 10.3 g/dL  Hematocrit : 33.0 %  Platelet Count - Automated : 202 K/uL  Mean Cell Volume : 90.4 fl  Mean Cell Hemoglobin : 28.2 pg  Mean Cell Hemoglobin Concentration : 31.2 gm/dL  Auto Neutrophil # : x  Auto Lymphocyte # : x  Auto Monocyte # : x  Auto Eosinophil # : x  Auto Basophil # : x  Auto Neutrophil % : x  Auto Lymphocyte % : x  Auto Monocyte % : x  Auto Eosinophil % : x  Auto Basophil % : x    02-09    139  |  102  |  26<H>  ----------------------------<  145<H>  3.7   |  25  |  0.95    Ca    8.6      09 Feb 2018 07:46      CAPILLARY BLOOD GLUCOSE      POCT Blood Glucose.: 153 mg/dL (09 Feb 2018 07:59)  POCT Blood Glucose.: 126 mg/dL (08 Feb 2018 21:04)  POCT Blood Glucose.: 159 mg/dL (08 Feb 2018 16:51)  POCT Blood Glucose.: 195 mg/dL (08 Feb 2018 11:44)      PT/INR - ( 08 Feb 2018 07:35 )   PT: 28.4 sec;   INR: 2.47 ratio                 Vitals:  Vital Signs Last 24 Hrs  T(C): 36.8 (09 Feb 2018 04:31), Max: 36.8 (09 Feb 2018 04:31)  T(F): 98.2 (09 Feb 2018 04:31), Max: 98.2 (09 Feb 2018 04:31)  HR: 75 (09 Feb 2018 04:31) (58 - 75)  BP: 151/78 (09 Feb 2018 04:31) (112/65 - 151/78)  BP(mean): --  RR: 18 (09 Feb 2018 04:31) (18 - 18)  SpO2: 94% (09 Feb 2018 04:31) (94% - 98%)    NEUROLOGICAL EXAM:    Mental status: Awake, alert, and in no apparent distress. Oriented to person, place and time. Language function is normal. not good historian    Cranial Nerves: Pupils were equal, round, reactive to light. Extraocular movements were intact. Visual field were full. Fundoscopic exam was deferred. Facial sensation was intact to light touch. There was no facial asymmetry. The palate was upgoing symmetrically and tongue was midline. Hearing acuity was intact to finger rub AU. Shoulder shrug was full bilaterally    Motor exam: Bulk and tone were normal. Strength was 5/5 in all four extremities. Fine finger movements were symmetric and normal. There was no pronator drift    Reflexes: 2+ in the bilateral upper extremities. 2+ in the bilateral lower extremities. Toes were downgoing bilaterally.     Sensation: Intact to light touch, temperature, vibration and proprioception.     Coordination: Finger-nose-finger and heel-to-shin was without dysmetria.     Gait: defered    < from: CT Head No Cont (02.04.18 @ 00:21) >    EXAM:  CT BRAIN                            PROCEDURE DATE:  02/04/2018            INTERPRETATION:  CLINICAL INFORMATION: Head trauma. Left leg weakness.    TECHNIQUE: Noncontrast axial CT images were acquired through the head.   Two-dimensional sagittal and coronal reformats were generated.    COMPARISON STUDY: CT head dated April 3, 2017    FINDINGS:     There is no CT evidence of acute intracranial hemorrhage or extra-axial   collection.    There is no CT evidence of an acute demarcated territorial infarct.    There are confluent periventricular white matter hypodensities compatible   with chronic microvascular-type change.     There is no vasogenic edema or mass effect.    The ventricles and sulci are normal in size and configuration forthe   patient's age.    The basal cisterns are patent.    There is mucosal disease in the left maxillary sinus. The mastoid air   cells and middle ear cavities are grossly clear.    The soft tissues of the scalp and face are unremarkable.    The bones of the calvarium, skull base, and face are unremarkable.    IMPRESSION:     No CT evidence of acute intracranial hemorrhage or mass effect.    No CT evidence of acute demarcated territorial infarct.     If the patient's symptoms persist, consider short interval follow-up head   CT or brain MRI if there are no MRI contraindications.                ALIRIO LINDSEY M.D., RADIOLOGY RESIDENT  This document has been electronically signed.  DOE LR M.D., ATTENDING RADIOLOGIST  This document has been electronically signed. Feb 4 2018 12:57AM                < end of copied text >

## 2018-02-09 NOTE — PROGRESS NOTE ADULT - SUBJECTIVE AND OBJECTIVE BOX
[facility], Division of Infectious Diseases  MAYA Pyle A. Lee    Name: MARU VILLASEÑOR  Age: 82y  Gender: Female  MRN: [id number]    Interval History--  Notes reviewed    Past Medical History--  Breast cancer  Lung cancer, left  Diabetes mellitus  Afib  S/P colectomy  Status post left hip replacement  S/P lobectomy of lung      For details regarding the patient's social history, family history, and other miscellaneous elements, please refer the initial infectious diseases consultation and/or the admitting history and physical examination for this admission.    Allergies    codeine (Fever; Rash)  contrast media (iodine-based) (Other)    Intolerances        Medications--  Antibiotics:    Immunologic:    Other:  acetaminophen   Tablet PRN  ALBUTerol/ipratropium for Nebulization  cholecalciferol  dextrose 5%.  insulin lispro (HumaLOG) corrective regimen sliding scale  insulin lispro (HumaLOG) corrective regimen sliding scale  umeclidinium 62.5 MICROgram(s)/vilanterol 25 MICROgram(s) Inhaler  warfarin      Review of Systems--  A 10-point review of systems was obtained.     Pertinent positives and negatives--  Constitutional: No fevers. No Chills. No Rigors.   Cardiovascular: No chest pain. No palpitations.  Respiratory: No shortness of breath. No cough.  Gastrointestinal: No nausea or vomiting. No diarrhea or constipation.   Psychiatric: Pleasant. Appropriate affect.    Review of systems otherwise negative except as previously noted.    Physical Examination--  Vital Signs: T(F): 98.4 (02-09-18 @ 11:48), Max: 98.4 (02-09-18 @ 11:48)  HR: 85 (02-09-18 @ 11:48)  BP: 111/67 (02-09-18 @ 11:48)  RR: 18 (02-09-18 @ 11:48)  SpO2: 94% (02-09-18 @ 11:48)  Wt(kg): --  General: Nontoxic-appearing Female in no acute distress.  HEENT: AT/NC. PERRL. EOMI. Anicteric. Conjunctiva pink and moist. Oropharynx clear. Dentition fair.  Neck: Not rigid. No sense of mass.  Nodes: None palpable.  Lungs: Clear bilaterally without rales, wheezing or rhonchi  Heart: Regular rate and rhythm. No Murmur. No rub. No gallop. No palpable thrill.  Abdomen: Bowel sounds present and normoactive. Soft. Nondistended. Nontender. No sense of mass. No organomegaly.  Back: No spinal tenderness. No costovertebral angle tenderness.   Extremities: No cyanosis or clubbing. No edema.   Skin: Warm. Dry. Good turgor. No rash. No vasculitic stigmata.  Psychiatric: Appropriate affect and mood for situation.         Laboratory Studies--  CBC                        12.4   8.20  )-----------( 248      ( 09 Feb 2018 07:54 )             38.2       Chemistries  02-09    139  |  102  |  26<H>  ----------------------------<  145<H>  3.7   |  25  |  0.95    Ca    8.6      09 Feb 2018 07:46        Culture Data      Culture - Urine (02.05.18 @ 14:23)    Specimen Source: .Urine Clean Catch (Midstream)    Culture Results:   No growth    Culture - Blood (02.04.18 @ 05:55)    Specimen Source: .Blood Blood-Peripheral    Culture Results:   No growth at 5 days.        < from: CT Chest w/ IV Cont (02.06.18 @ 21:26) >    EXAM:  CT CHEST IC                          EXAM:  CT ABDOMEN AND PELVIS OC IC                            PROCEDURE DATE:  02/06/2018            INTERPRETATION:  CLINICAL INFORMATION: Fever of unknown origin, syncope.    COMPARISON: CT chest abdomen and pelvis dated 1/20/2018.    PROCEDURE:   CT of the Chest, Abdomen and Pelvis was performed with intravenous   contrast.   Intravenous contrast: 90 ml Omnipaque 350. 10 ml discarded.  Oral contrast: positive contrast was administered.  Sagittal and coronal reformats were performed.    FINDINGS:    CHEST:     LUNGS AND LARGE AIRWAYS: Interval development of small bilateral pleural   effusions, right greater than left, associated with subsegmental   atelectasis. Centrilobular emphysematous changes. Left lower lobectomy.   PLEURA: As above.  VESSELS: Atherosclerotic vascular calcifications  HEART: Heart size is normal. No pericardial effusion.  MEDIASTINUM AND LETICIA: Mildly prominent nonspecific mediastinal and right   hilar lymph nodes up to 1 cm in short axis dimension.  CHEST WALL AND LOWER NECK: Within normal limits.    ABDOMEN AND PELVIS:    LIVER: Within normal limits.  BILE DUCTS: Normal caliber.  GALLBLADDER: Within normal limits.  SPLEEN: Within normal limits.  PANCREAS: Within normal limits.  ADRENALS: Within normal limits.  KIDNEYS/URETERS: Bilateral renal cysts. Right interpolar renal cortical   scarring. No bilateral renal hydronephrosis.    BLADDER: Small amount of air within the urinary bladder, likely secondary   to Galan balloon catheter placement. Bladder is partially obscured due to   streak artifact from the left hip hardware.  REPRODUCTIVE ORGANS: The uterus is unremarkable. Right ovarian cyst   measures 1.7 x 1.8 cm. Left adnexa is unremarkable.    BOWEL: Status post partial right colonic resection with intact   anastomosis. Thickening of the rectal wall, associated with moderate   amount of presacral fluid and edema, concerning for proctitis. Extensive   diverticulosis without evidence of diverticulitis. No bowel obstruction.   PERITONEUM: Mild mesenteric edema. Presacral fluid and edema as above.  VESSELS:  Atherosclerotic vascular calcifications.  RETROPERITONEUM:  Nonspecific subcentimeter retroperitoneal lymph nodes.   Pelvic sidewall lymph nodes up to 1.2 cm in short axis.  ABDOMINAL WALL: Within normal limits.  BONES: Status post left total hip arthroplasty. Degenerative changes and   scoliosis of the visualized spine.    IMPRESSION:      Suspicious for proctitis.    Interval development of small bilateral pleural effusions, right greater   than left, associated with subsegmental atelectasis. Centrilobular   emphysema.      < end of copied text > [facility], Division of Infectious Diseases  MAYA Pyle A. Lee  [phone]  Name: MARU VILLASEÑOR  Age: 82y  Gender: Female  MRN: [id number]    Interval History--  Notes reviewed  No complaints  no fevers, mcadams out    Past Medical History--  Breast cancer  Lung cancer, left  Diabetes mellitus  Afib  S/P colectomy  Status post left hip replacement  S/P lobectomy of lung      For details regarding the patient's social history, family history, and other miscellaneous elements, please refer the initial infectious diseases consultation and/or the admitting history and physical examination for this admission.    Allergies    codeine (Fever; Rash)  contrast media (iodine-based) (Other)    Intolerances        Medications--  Antibiotics:    Immunologic:    Other:  acetaminophen   Tablet PRN  ALBUTerol/ipratropium for Nebulization  cholecalciferol  dextrose 5%.  insulin lispro (HumaLOG) corrective regimen sliding scale  insulin lispro (HumaLOG) corrective regimen sliding scale  umeclidinium 62.5 MICROgram(s)/vilanterol 25 MICROgram(s) Inhaler  warfarin      Review of Systems--  A 10-point review of systems was obtained.     Pertinent positives and negatives--  Constitutional: No fevers. No Chills. No Rigors.   Cardiovascular: No chest pain. No palpitations.  Respiratory: No shortness of breath. No cough.  Gastrointestinal: No nausea or vomiting. No diarrhea or constipation.   Psychiatric: Pleasant. Appropriate affect.    Review of systems otherwise negative except as previously noted.    Physical Examination--  Vital Signs: T(F): 98.4 (02-09-18 @ 11:48), Max: 98.4 (02-09-18 @ 11:48)  HR: 85 (02-09-18 @ 11:48)  BP: 111/67 (02-09-18 @ 11:48)  RR: 18 (02-09-18 @ 11:48)  SpO2: 94% (02-09-18 @ 11:48)  Wt(kg): --  General: Nontoxic-appearing Female in no acute distress.  HEENT: AT/NC.  Anicteric. Conjunctiva pink and moist. Oropharynx clear. Dentition fair.  Neck: Not rigid. No sense of mass.  Nodes: None palpable.  Lungs: Clear bilaterally without rales, wheezing or rhonchi  Heart: Regular rate and rhythm. No Murmur. No rub. No gallop. No palpable thrill.  Abdomen: Bowel sounds present and normoactive. Soft. Nondistended. Nontender. .  Back: No spinal tenderness. No costovertebral angle tenderness.   Extremities: No cyanosis or clubbing. trace edema.   Skin: Warm. Dry. Good turgor. No rash. No vasculitic stigmata.  Psychiatric: Appropriate affect and mood for situation.         Laboratory Studies--  CBC                        12.4   8.20  )-----------( 248      ( 09 Feb 2018 07:54 )             38.2       Chemistries  02-09    139  |  102  |  26<H>  ----------------------------<  145<H>  3.7   |  25  |  0.95    Ca    8.6      09 Feb 2018 07:46        Culture Data      Culture - Urine (02.05.18 @ 14:23)    Specimen Source: .Urine Clean Catch (Midstream)    Culture Results:   No growth    Culture - Blood (02.04.18 @ 05:55)    Specimen Source: .Blood Blood-Peripheral    Culture Results:   No growth at 5 days.        < from: CT Chest w/ IV Cont (02.06.18 @ 21:26) >    EXAM:  CT CHEST IC                          EXAM:  CT ABDOMEN AND PELVIS OC IC                            PROCEDURE DATE:  02/06/2018            INTERPRETATION:  CLINICAL INFORMATION: Fever of unknown origin, syncope.    COMPARISON: CT chest abdomen and pelvis dated 1/20/2018.    PROCEDURE:   CT of the Chest, Abdomen and Pelvis was performed with intravenous   contrast.   Intravenous contrast: 90 ml Omnipaque 350. 10 ml discarded.  Oral contrast: positive contrast was administered.  Sagittal and coronal reformats were performed.    FINDINGS:    CHEST:     LUNGS AND LARGE AIRWAYS: Interval development of small bilateral pleural   effusions, right greater than left, associated with subsegmental   atelectasis. Centrilobular emphysematous changes. Left lower lobectomy.   PLEURA: As above.  VESSELS: Atherosclerotic vascular calcifications  HEART: Heart size is normal. No pericardial effusion.  MEDIASTINUM AND LETICIA: Mildly prominent nonspecific mediastinal and right   hilar lymph nodes up to 1 cm in short axis dimension.  CHEST WALL AND LOWER NECK: Within normal limits.    ABDOMEN AND PELVIS:    LIVER: Within normal limits.  BILE DUCTS: Normal caliber.  GALLBLADDER: Within normal limits.  SPLEEN: Within normal limits.  PANCREAS: Within normal limits.  ADRENALS: Within normal limits.  KIDNEYS/URETERS: Bilateral renal cysts. Right interpolar renal cortical   scarring. No bilateral renal hydronephrosis.    BLADDER: Small amount of air within the urinary bladder, likely secondary   to Mcadams balloon catheter placement. Bladder is partially obscured due to   streak artifact from the left hip hardware.  REPRODUCTIVE ORGANS: The uterus is unremarkable. Right ovarian cyst   measures 1.7 x 1.8 cm. Left adnexa is unremarkable.    BOWEL: Status post partial right colonic resection with intact   anastomosis. Thickening of the rectal wall, associated with moderate   amount of presacral fluid and edema, concerning for proctitis. Extensive   diverticulosis without evidence of diverticulitis. No bowel obstruction.   PERITONEUM: Mild mesenteric edema. Presacral fluid and edema as above.  VESSELS:  Atherosclerotic vascular calcifications.  RETROPERITONEUM:  Nonspecific subcentimeter retroperitoneal lymph nodes.   Pelvic sidewall lymph nodes up to 1.2 cm in short axis.  ABDOMINAL WALL: Within normal limits.  BONES: Status post left total hip arthroplasty. Degenerative changes and   scoliosis of the visualized spine.    IMPRESSION:      Suspicious for proctitis.    Interval development of small bilateral pleural effusions, right greater   than left, associated with subsegmental atelectasis. Centrilobular   emphysema.      < end of copied text >

## 2018-02-09 NOTE — EEG REPORT - NS EEG TEXT BOX
Study Date: 		 2/8/18    ROUTINE EEG    Technical Information:			  		  Placement and Labeling of Electrodes:  The EEG was performed utilizing 20 channels referential EEG connections (coronal over temporal over parasagittal montage) using all standard 10-20 electrode placements with EKG.  Recording was at a sampling rate of 256 samples per second per channel.  Time synchronized digital video recording was done simultaneously with EEG recording.  A low light infrared camera was used for low light recording.  Christiano and seizure detection algorithms were utilized.    CSA Technical Component:  Quantitative EEG analysis using a separate Compressed Spectral Array (CSA) software package was conducted in real-time and run at bedside after set up by the technician, digitally displaying the power of electrographic frequencies included in the 1-30Hz band using a graded color map.  This data was reviewed and interpreted independently, and is reported in a separate section below.      --------------------------------------------------------------------------------------------------  Study Interpretation:    FINDINGS:  The background was continuous, spontaneously variable and reactive.  During wakefulness, the posteriorly dominant rhythm consisted of symmetric, well modulated 9 Hz activity, with an amplitude to 30 uV, that attenuated to eye opening.  Low amplitude diffuse beta was noted in wakefulness.    Background Slowing:  Generalized slowing: none was present.  Focal slowing: none was present.    Sleep Background:  Stage II sleep transients were not recorded.    Epileptiform Activity:   No epileptiform discharges were present.    Events:  No clinical events were recorded.  No seizures were recorded.    Activation Procedures:   -Hyperventilation was not performed.    -Photic stimulation was not performed.    Artifacts:  Intermittent myogenic and movement artifacts were noted.    ECG:  The heart rate on single channel ECG was predominantly between 70-80 BPM.  -----------------------------------------------------------------------------------------------------    EEG Classification / Summary:  Normal EEG Study     Clinical Impression:  There were no epileptiform abnormalities recorded.      -------------------------------------------------------------------------------------------------------  Jose Andre M.D.   of Neurology, [facility] Epilepsy [place]

## 2018-02-09 NOTE — PROGRESS NOTE ADULT - PROBLEM SELECTOR PLAN 1
now afebrile > 48 hours  so far no focal infectious etiology isolated as a reason for the fever  blood cx neg, rvp neg urine cx neg and ua is negative  echo no vegetations noted  urinary retention now with mcadams  she is off antibx now. and cont to monitor  CT with contrast does not show pathology that would be cause of fever  DVT can give fever, but seems that would be resolving by now.  has small b/l pleural effusions doubt significant  encourage oob  follow temp curve  would encourage oob  sed rate mildly elevated

## 2018-02-09 NOTE — PROGRESS NOTE ADULT - SUBJECTIVE AND OBJECTIVE BOX
Says she feels tired but otherwise feels fine    Vital Signs Last 24 Hrs  T(C): 36.8 (09 Feb 2018 04:31), Max: 36.8 (09 Feb 2018 04:31)  T(F): 98.2 (09 Feb 2018 04:31), Max: 98.2 (09 Feb 2018 04:31)  HR: 75 (09 Feb 2018 04:31) (58 - 75)  BP: 151/78 (09 Feb 2018 04:31) (112/65 - 151/78)  BP(mean): --  RR: 18 (09 Feb 2018 04:31) (18 - 18)  SpO2: 94% (09 Feb 2018 04:31) (94% - 98%)    GENERAL: NAD, AAOx3  HEAD:  Atraumatic, Normocephalic  EYES: EOMI  NECK: Supple, No JVD, No LAD  CHEST/LUNG: Clear to auscultation bilaterally  HEART: Regular rate and rhythm; nl S1/S2; No murmurs, rubs, or gallops  ABDOMEN: Soft, Nontender, Nondistended; Bowel sounds present  EXTREMITIES:  2+ Peripheral Pulses, 2+ b/l LE edema  SKIN: No rashes or lesions  NEURO: nonfocal CN/motor/sensory/reflexes    LABS:                        12.4   8.20  )-----------( 248      ( 09 Feb 2018 07:54 )             38.2     02-09    139  |  102  |  26<H>  ----------------------------<  145<H>  3.7   |  25  |  0.95    Ca    8.6      09 Feb 2018 07:46      PT/INR - ( 09 Feb 2018 07:54 )   PT: 28.9 sec;   INR: 2.51 ratio           CAPILLARY BLOOD GLUCOSE      POCT Blood Glucose.: 153 mg/dL (09 Feb 2018 07:59)  POCT Blood Glucose.: 126 mg/dL (08 Feb 2018 21:04)  POCT Blood Glucose.: 159 mg/dL (08 Feb 2018 16:51)  POCT Blood Glucose.: 195 mg/dL (08 Feb 2018 11:44)

## 2018-02-10 LAB
ANION GAP SERPL CALC-SCNC: 13 MMOL/L — SIGNIFICANT CHANGE UP (ref 5–17)
BUN SERPL-MCNC: 22 MG/DL — SIGNIFICANT CHANGE UP (ref 7–23)
CALCIUM SERPL-MCNC: 8.8 MG/DL — SIGNIFICANT CHANGE UP (ref 8.4–10.5)
CHLORIDE SERPL-SCNC: 101 MMOL/L — SIGNIFICANT CHANGE UP (ref 96–108)
CO2 SERPL-SCNC: 23 MMOL/L — SIGNIFICANT CHANGE UP (ref 22–31)
CREAT SERPL-MCNC: 0.85 MG/DL — SIGNIFICANT CHANGE UP (ref 0.5–1.3)
GLUCOSE BLDC GLUCOMTR-MCNC: 164 MG/DL — HIGH (ref 70–99)
GLUCOSE BLDC GLUCOMTR-MCNC: 174 MG/DL — HIGH (ref 70–99)
GLUCOSE BLDC GLUCOMTR-MCNC: 202 MG/DL — HIGH (ref 70–99)
GLUCOSE BLDC GLUCOMTR-MCNC: 237 MG/DL — HIGH (ref 70–99)
GLUCOSE SERPL-MCNC: 208 MG/DL — HIGH (ref 70–99)
HCT VFR BLD CALC: 37.1 % — SIGNIFICANT CHANGE UP (ref 34.5–45)
HGB BLD-MCNC: 12.2 G/DL — SIGNIFICANT CHANGE UP (ref 11.5–15.5)
INR BLD: 2.69 RATIO — HIGH (ref 0.88–1.16)
MCHC RBC-ENTMCNC: 29.6 PG — SIGNIFICANT CHANGE UP (ref 27–34)
MCHC RBC-ENTMCNC: 32.9 GM/DL — SIGNIFICANT CHANGE UP (ref 32–36)
MCV RBC AUTO: 90 FL — SIGNIFICANT CHANGE UP (ref 80–100)
PLATELET # BLD AUTO: 271 K/UL — SIGNIFICANT CHANGE UP (ref 150–400)
POTASSIUM SERPL-MCNC: 3.7 MMOL/L — SIGNIFICANT CHANGE UP (ref 3.5–5.3)
POTASSIUM SERPL-SCNC: 3.7 MMOL/L — SIGNIFICANT CHANGE UP (ref 3.5–5.3)
PROTHROM AB SERPL-ACNC: 31 SEC — HIGH (ref 10–13.1)
RBC # BLD: 4.12 M/UL — SIGNIFICANT CHANGE UP (ref 3.8–5.2)
RBC # FLD: 14.8 % — HIGH (ref 10.3–14.5)
SODIUM SERPL-SCNC: 137 MMOL/L — SIGNIFICANT CHANGE UP (ref 135–145)
WBC # BLD: 11.95 K/UL — HIGH (ref 3.8–10.5)
WBC # FLD AUTO: 11.95 K/UL — HIGH (ref 3.8–10.5)

## 2018-02-10 RX ORDER — WARFARIN SODIUM 2.5 MG/1
2 TABLET ORAL ONCE
Qty: 0 | Refills: 0 | Status: COMPLETED | OUTPATIENT
Start: 2018-02-10 | End: 2018-02-10

## 2018-02-10 RX ADMIN — Medication 1000 UNIT(S): at 11:40

## 2018-02-10 RX ADMIN — Medication 1: at 17:00

## 2018-02-10 RX ADMIN — Medication 2: at 08:29

## 2018-02-10 RX ADMIN — Medication 3 MILLILITER(S): at 22:07

## 2018-02-10 RX ADMIN — UMECLIDINIUM BROMIDE AND VILANTEROL TRIFENATATE 1 PUFF(S): 62.5; 25 POWDER RESPIRATORY (INHALATION) at 08:30

## 2018-02-10 RX ADMIN — Medication 1: at 12:26

## 2018-02-10 RX ADMIN — WARFARIN SODIUM 2 MILLIGRAM(S): 2.5 TABLET ORAL at 22:00

## 2018-02-10 RX ADMIN — Medication 3 MILLILITER(S): at 06:36

## 2018-02-10 NOTE — PROGRESS NOTE ADULT - SUBJECTIVE AND OBJECTIVE BOX
Neurology  Progress Note  Subjective: Pt lying in bed, no complaints  EEG normal    Medications:  acetaminophen   Tablet 650 milliGRAM(s) Oral every 6 hours PRN  ALBUTerol/ipratropium for Nebulization 3 milliLiter(s) Nebulizer every 6 hours  cholecalciferol 1000 Unit(s) Oral daily  dextrose 5%. 1000 milliLiter(s) IV Continuous <Continuous>  insulin lispro (HumaLOG) corrective regimen sliding scale   SubCutaneous at bedtime  insulin lispro (HumaLOG) corrective regimen sliding scale   SubCutaneous three times a day before meals  umeclidinium 62.5 MICROgram(s)/vilanterol 25 MICROgram(s) Inhaler 1 Puff(s) Inhalation daily    Labs:  CBC Full  -  ( 10 Feb 2018 12:11 )  WBC Count : 11.95 K/uL  Hemoglobin : 12.2 g/dL  Hematocrit : 37.1 %  Platelet Count - Automated : 271 K/uL  Mean Cell Volume : 90.0 fl  Mean Cell Hemoglobin : 29.6 pg  Mean Cell Hemoglobin Concentration : 32.9 gm/dL  Auto Neutrophil # : x  Auto Lymphocyte # : x  Auto Monocyte # : x  Auto Eosinophil # : x  Auto Basophil # : x  Auto Neutrophil % : x  Auto Lymphocyte % : x  Auto Monocyte % : x  Auto Eosinophil % : x  Auto Basophil % : x  02-10  137  |  101  |  22  ----------------------------<  208<H>  3.7   |  23  |  0.85  Ca    8.8      10 Feb 2018 12:11    POCT Blood Glucose.: 164 mg/dL (10 Feb 2018 11:49)  POCT Blood Glucose.: 202 mg/dL (10 Feb 2018 07:48)  POCT Blood Glucose.: 223 mg/dL (09 Feb 2018 21:14)  POCT Blood Glucose.: 268 mg/dL (09 Feb 2018 16:45)  PT/INR - ( 10 Feb 2018 12:11 )   PT: 31.0 sec;   INR: 2.69 ratio      Vitals:  Vital Signs Last 24 Hrs  T(C): 37.7 (10 Feb 2018 11:50), Max: 37.7 (10 Feb 2018 11:50)  T(F): 99.8 (10 Feb 2018 11:50), Max: 99.8 (10 Feb 2018 11:50)  HR: 94 (10 Feb 2018 11:50) (70 - 94)  BP: 137/76 (10 Feb 2018 11:50) (107/63 - 151/79)  BP(mean): --  RR: 17 (10 Feb 2018 11:50) (17 - 18)  SpO2: 96% (10 Feb 2018 11:50) (96% - 98%)    NEUROLOGICAL EXAM:    Mental status: Awake, alert, and in no apparent distress. Oriented to self, hospital, 2018, president 'the idiot'. Speech sparse but fluent. not good historian    Cranial Nerves: Pupils were equal, round, reactive to light. Extraocular movements were intact. Fundoscopic exam was deferred. Facial sensation was intact to light touch. There was no facial asymmetry. The palate was upgoing symmetrically and tongue was midline. Hearing acuity was intact to finger rub AU. Shoulder shrug was full bilaterally    Motor exam: Bulk and tone were normal. Poorly cooperative with exam. Moving all extremities, LE pain limited.     Reflexes: 2 in the bilateral upper extremities. 2 in the bilateral lower extremities. Toes were downgoing bilaterally.     Sensation: Intact to light touch, temperature.     Coordination: not cooperative with.     Gait: deferred    < from: CT Head No Cont (02.04.18 @ 00:21) >    EXAM:  CT BRAIN                            PROCEDURE DATE:  02/04/2018      INTERPRETATION:  CLINICAL INFORMATION: Head trauma. Left leg weakness.    TECHNIQUE: Noncontrast axial CT images were acquired through the head.   Two-dimensional sagittal and coronal reformats were generated.    COMPARISON STUDY: CT head dated April 3, 2017    FINDINGS:     There is no CT evidence of acute intracranial hemorrhage or extra-axial   collection.    There is no CT evidence of an acute demarcated territorial infarct.    There are confluent periventricular white matter hypodensities compatible   with chronic microvascular-type change.     There is no vasogenic edema or mass effect.    The ventricles and sulci are normal in size and configuration forthe   patient's age.    The basal cisterns are patent.    There is mucosal disease in the left maxillary sinus. The mastoid air   cells and middle ear cavities are grossly clear.    The soft tissues of the scalp and face are unremarkable.    The bones of the calvarium, skull base, and face are unremarkable.    IMPRESSION:     No CT evidence of acute intracranial hemorrhage or mass effect.    No CT evidence of acute demarcated territorial infarct.     If the patient's symptoms persist, consider short interval follow-up head   CT or brain MRI if there are no MRI contraindications.    ALIRIO LINDSEY M.D., RADIOLOGY RESIDENT  This document has been electronically signed.  DOE RL M.D., ATTENDING RADIOLOGIST  This document has been electronically signed. Feb 4 2018 12:57AM

## 2018-02-10 NOTE — PROGRESS NOTE ADULT - SUBJECTIVE AND OBJECTIVE BOX
Appears very tired; failed voiding trial yesterday    Vital Signs Last 24 Hrs  T(C): 36.6 (10 Feb 2018 04:25), Max: 37.1 (09 Feb 2018 21:11)  T(F): 97.8 (10 Feb 2018 04:25), Max: 98.8 (09 Feb 2018 21:11)  HR: 70 (10 Feb 2018 04:25) (70 - 85)  BP: 107/63 (10 Feb 2018 04:25) (107/63 - 151/79)  BP(mean): --  RR: 17 (10 Feb 2018 04:25) (17 - 18)  SpO2: 97% (10 Feb 2018 04:25) (94% - 98%)    GENERAL: NAD, AAOx3  HEAD:  Atraumatic, Normocephalic  EYES: EOMI  NECK: Supple, No JVD, No LAD  CHEST/LUNG: Clear to auscultation bilaterally  HEART: Regular rate and rhythm; nl S1/S2; No murmurs, rubs, or gallops  ABDOMEN: Soft, Nontender, Nondistended; Bowel sounds present  EXTREMITIES:  2+ Peripheral Pulses, 2+ b/l LE edema  SKIN: No rashes or lesions  NEURO: nonfocal CN/motor/sensory/reflexes    LABS:                        12.4   8.20  )-----------( 248      ( 09 Feb 2018 07:54 )             38.2     02-09    139  |  102  |  26<H>  ----------------------------<  145<H>  3.7   |  25  |  0.95    Ca    8.6      09 Feb 2018 07:46      PT/INR - ( 09 Feb 2018 07:54 )   PT: 28.9 sec;   INR: 2.51 ratio           CAPILLARY BLOOD GLUCOSE      POCT Blood Glucose.: 202 mg/dL (10 Feb 2018 07:48)  POCT Blood Glucose.: 223 mg/dL (09 Feb 2018 21:14)  POCT Blood Glucose.: 268 mg/dL (09 Feb 2018 16:45)  POCT Blood Glucose.: 200 mg/dL (09 Feb 2018 11:50)

## 2018-02-11 LAB
ALBUMIN SERPL ELPH-MCNC: 2.9 G/DL — LOW (ref 3.3–5)
ALP SERPL-CCNC: 59 U/L — SIGNIFICANT CHANGE UP (ref 40–120)
ALT FLD-CCNC: 25 U/L RC — SIGNIFICANT CHANGE UP (ref 10–45)
ANION GAP SERPL CALC-SCNC: 13 MMOL/L — SIGNIFICANT CHANGE UP (ref 5–17)
ANION GAP SERPL CALC-SCNC: 20 MMOL/L — HIGH (ref 5–17)
APPEARANCE UR: ABNORMAL
AST SERPL-CCNC: 13 U/L — SIGNIFICANT CHANGE UP (ref 10–40)
BILIRUB SERPL-MCNC: 0.4 MG/DL — SIGNIFICANT CHANGE UP (ref 0.2–1.2)
BILIRUB UR-MCNC: NEGATIVE — SIGNIFICANT CHANGE UP
BUN SERPL-MCNC: 24 MG/DL — HIGH (ref 7–23)
BUN SERPL-MCNC: 25 MG/DL — HIGH (ref 7–23)
CALCIUM SERPL-MCNC: 9 MG/DL — SIGNIFICANT CHANGE UP (ref 8.4–10.5)
CALCIUM SERPL-MCNC: 9.1 MG/DL — SIGNIFICANT CHANGE UP (ref 8.4–10.5)
CHLORIDE SERPL-SCNC: 95 MMOL/L — LOW (ref 96–108)
CHLORIDE SERPL-SCNC: 95 MMOL/L — LOW (ref 96–108)
CO2 SERPL-SCNC: 19 MMOL/L — LOW (ref 22–31)
CO2 SERPL-SCNC: 22 MMOL/L — SIGNIFICANT CHANGE UP (ref 22–31)
COLOR SPEC: YELLOW — SIGNIFICANT CHANGE UP
CREAT SERPL-MCNC: 1.02 MG/DL — SIGNIFICANT CHANGE UP (ref 0.5–1.3)
CREAT SERPL-MCNC: 1.07 MG/DL — SIGNIFICANT CHANGE UP (ref 0.5–1.3)
DIFF PNL FLD: ABNORMAL
GLUCOSE BLDC GLUCOMTR-MCNC: 226 MG/DL — HIGH (ref 70–99)
GLUCOSE BLDC GLUCOMTR-MCNC: 229 MG/DL — HIGH (ref 70–99)
GLUCOSE BLDC GLUCOMTR-MCNC: 243 MG/DL — HIGH (ref 70–99)
GLUCOSE BLDC GLUCOMTR-MCNC: 262 MG/DL — HIGH (ref 70–99)
GLUCOSE SERPL-MCNC: 213 MG/DL — HIGH (ref 70–99)
GLUCOSE SERPL-MCNC: 247 MG/DL — HIGH (ref 70–99)
GLUCOSE UR QL: 500 MG/DL
HCT VFR BLD CALC: 36.2 % — SIGNIFICANT CHANGE UP (ref 34.5–45)
HCT VFR BLD CALC: 37.5 % — SIGNIFICANT CHANGE UP (ref 34.5–45)
HGB BLD-MCNC: 11.9 G/DL — SIGNIFICANT CHANGE UP (ref 11.5–15.5)
HGB BLD-MCNC: 12.3 G/DL — SIGNIFICANT CHANGE UP (ref 11.5–15.5)
INR BLD: 1.66 RATIO — HIGH (ref 0.88–1.16)
KETONES UR-MCNC: NEGATIVE — SIGNIFICANT CHANGE UP
LEUKOCYTE ESTERASE UR-ACNC: ABNORMAL
MCHC RBC-ENTMCNC: 29.1 PG — SIGNIFICANT CHANGE UP (ref 27–34)
MCHC RBC-ENTMCNC: 29.9 PG — SIGNIFICANT CHANGE UP (ref 27–34)
MCHC RBC-ENTMCNC: 32.7 GM/DL — SIGNIFICANT CHANGE UP (ref 32–36)
MCHC RBC-ENTMCNC: 32.9 GM/DL — SIGNIFICANT CHANGE UP (ref 32–36)
MCV RBC AUTO: 88.5 FL — SIGNIFICANT CHANGE UP (ref 80–100)
MCV RBC AUTO: 91.6 FL — SIGNIFICANT CHANGE UP (ref 80–100)
NITRITE UR-MCNC: NEGATIVE — SIGNIFICANT CHANGE UP
PH UR: 6 — SIGNIFICANT CHANGE UP (ref 5–8)
PLATELET # BLD AUTO: 286 K/UL — SIGNIFICANT CHANGE UP (ref 150–400)
PLATELET # BLD AUTO: 300 K/UL — SIGNIFICANT CHANGE UP (ref 150–400)
POTASSIUM SERPL-MCNC: 3.5 MMOL/L — SIGNIFICANT CHANGE UP (ref 3.5–5.3)
POTASSIUM SERPL-MCNC: 3.6 MMOL/L — SIGNIFICANT CHANGE UP (ref 3.5–5.3)
POTASSIUM SERPL-SCNC: 3.5 MMOL/L — SIGNIFICANT CHANGE UP (ref 3.5–5.3)
POTASSIUM SERPL-SCNC: 3.6 MMOL/L — SIGNIFICANT CHANGE UP (ref 3.5–5.3)
PROT SERPL-MCNC: 6.9 G/DL — SIGNIFICANT CHANGE UP (ref 6–8.3)
PROT UR-MCNC: 100 MG/DL
PROTHROM AB SERPL-ACNC: 18.9 SEC — HIGH (ref 10–13.1)
RAPID RVP RESULT: SIGNIFICANT CHANGE UP
RBC # BLD: 4.09 M/UL — SIGNIFICANT CHANGE UP (ref 3.8–5.2)
RBC # BLD: 4.09 M/UL — SIGNIFICANT CHANGE UP (ref 3.8–5.2)
RBC # FLD: 13.5 % — SIGNIFICANT CHANGE UP (ref 10.3–14.5)
RBC # FLD: 15.3 % — HIGH (ref 10.3–14.5)
SODIUM SERPL-SCNC: 130 MMOL/L — LOW (ref 135–145)
SODIUM SERPL-SCNC: 134 MMOL/L — LOW (ref 135–145)
SP GR SPEC: 1.03 — HIGH (ref 1.01–1.02)
UROBILINOGEN FLD QL: 1 MG/DL — SIGNIFICANT CHANGE UP
WBC # BLD: 15.71 K/UL — HIGH (ref 3.8–10.5)
WBC # BLD: 16 K/UL — HIGH (ref 3.8–10.5)
WBC # FLD AUTO: 15.71 K/UL — HIGH (ref 3.8–10.5)
WBC # FLD AUTO: 16 K/UL — HIGH (ref 3.8–10.5)

## 2018-02-11 PROCEDURE — 71045 X-RAY EXAM CHEST 1 VIEW: CPT | Mod: 26

## 2018-02-11 RX ORDER — METRONIDAZOLE 500 MG
500 TABLET ORAL EVERY 8 HOURS
Qty: 0 | Refills: 0 | Status: DISCONTINUED | OUTPATIENT
Start: 2018-02-11 | End: 2018-02-17

## 2018-02-11 RX ORDER — WARFARIN SODIUM 2.5 MG/1
5 TABLET ORAL ONCE
Qty: 0 | Refills: 0 | Status: COMPLETED | OUTPATIENT
Start: 2018-02-11 | End: 2018-02-11

## 2018-02-11 RX ORDER — CIPROFLOXACIN LACTATE 400MG/40ML
400 VIAL (ML) INTRAVENOUS EVERY 12 HOURS
Qty: 0 | Refills: 0 | Status: DISCONTINUED | OUTPATIENT
Start: 2018-02-11 | End: 2018-02-17

## 2018-02-11 RX ORDER — ACETAMINOPHEN 500 MG
650 TABLET ORAL ONCE
Qty: 0 | Refills: 0 | Status: COMPLETED | OUTPATIENT
Start: 2018-02-11 | End: 2018-02-11

## 2018-02-11 RX ORDER — SODIUM CHLORIDE 9 MG/ML
1000 INJECTION INTRAMUSCULAR; INTRAVENOUS; SUBCUTANEOUS
Qty: 0 | Refills: 0 | Status: DISCONTINUED | OUTPATIENT
Start: 2018-02-11 | End: 2018-02-20

## 2018-02-11 RX ADMIN — Medication 2: at 12:13

## 2018-02-11 RX ADMIN — Medication 100 MILLIGRAM(S): at 17:50

## 2018-02-11 RX ADMIN — Medication 1000 UNIT(S): at 11:25

## 2018-02-11 RX ADMIN — Medication 650 MILLIGRAM(S): at 12:14

## 2018-02-11 RX ADMIN — Medication 1: at 21:51

## 2018-02-11 RX ADMIN — WARFARIN SODIUM 5 MILLIGRAM(S): 2.5 TABLET ORAL at 21:51

## 2018-02-11 RX ADMIN — Medication 3 MILLILITER(S): at 11:25

## 2018-02-11 RX ADMIN — Medication 3 MILLILITER(S): at 17:05

## 2018-02-11 RX ADMIN — Medication 200 MILLIGRAM(S): at 21:50

## 2018-02-11 RX ADMIN — Medication 2: at 08:29

## 2018-02-11 RX ADMIN — Medication 2: at 17:05

## 2018-02-11 RX ADMIN — SODIUM CHLORIDE 50 MILLILITER(S): 9 INJECTION INTRAMUSCULAR; INTRAVENOUS; SUBCUTANEOUS at 17:04

## 2018-02-11 RX ADMIN — Medication 3 MILLILITER(S): at 06:06

## 2018-02-11 RX ADMIN — SODIUM CHLORIDE 50 MILLILITER(S): 9 INJECTION INTRAMUSCULAR; INTRAVENOUS; SUBCUTANEOUS at 19:46

## 2018-02-11 RX ADMIN — UMECLIDINIUM BROMIDE AND VILANTEROL TRIFENATATE 1 PUFF(S): 62.5; 25 POWDER RESPIRATORY (INHALATION) at 10:02

## 2018-02-11 NOTE — PROGRESS NOTE ADULT - SUBJECTIVE AND OBJECTIVE BOX
Still quite weak  Denies any lower back pain    Vital Signs Last 24 Hrs  T(C): 36.4 (11 Feb 2018 06:36), Max: 37.8 (10 Feb 2018 21:01)  T(F): 97.6 (11 Feb 2018 06:36), Max: 100.1 (10 Feb 2018 21:01)  HR: 84 (11 Feb 2018 06:36) (78 - 94)  BP: 121/71 (11 Feb 2018 06:36) (121/69 - 137/76)  BP(mean): --  RR: 18 (11 Feb 2018 06:36) (17 - 20)  SpO2: 96% (11 Feb 2018 06:36) (94% - 96%)    GENERAL: NAD, AAOx3  HEAD:  Atraumatic, Normocephalic  EYES: EOMI  NECK: Supple, No JVD, No LAD  CHEST/LUNG: Clear to auscultation bilaterally  HEART: Regular rate and rhythm; nl S1/S2; No murmurs, rubs, or gallops  ABDOMEN: Soft, Nontender, Nondistended; Bowel sounds present  EXTREMITIES:  2+ Peripheral Pulses, 2+ b/l LE edema  SKIN: No rashes or lesions  NEURO: decreased motor strength all around, but no discrete asymmetry    LABS:                        12.2   11.95 )-----------( 271      ( 10 Feb 2018 12:11 )             37.1     02-10    137  |  101  |  22  ----------------------------<  208<H>  3.7   |  23  |  0.85    Ca    8.8      10 Feb 2018 12:11      PT/INR - ( 10 Feb 2018 12:11 )   PT: 31.0 sec;   INR: 2.69 ratio           CAPILLARY BLOOD GLUCOSE      POCT Blood Glucose.: 229 mg/dL (11 Feb 2018 07:48)  POCT Blood Glucose.: 237 mg/dL (10 Feb 2018 21:22)  POCT Blood Glucose.: 174 mg/dL (10 Feb 2018 16:37)  POCT Blood Glucose.: 164 mg/dL (10 Feb 2018 11:49)

## 2018-02-12 DIAGNOSIS — M54.2 CERVICALGIA: ICD-10-CM

## 2018-02-12 LAB
ALBUMIN SERPL ELPH-MCNC: 2.5 G/DL — LOW (ref 3.3–5)
ALP SERPL-CCNC: 64 U/L — SIGNIFICANT CHANGE UP (ref 40–120)
ALT FLD-CCNC: 33 U/L — SIGNIFICANT CHANGE UP (ref 10–45)
ANION GAP SERPL CALC-SCNC: 13 MMOL/L — SIGNIFICANT CHANGE UP (ref 5–17)
APTT BLD: 31.5 SEC — SIGNIFICANT CHANGE UP (ref 27.5–37.4)
AST SERPL-CCNC: 24 U/L — SIGNIFICANT CHANGE UP (ref 10–40)
BASOPHILS # BLD AUTO: 0 K/UL — SIGNIFICANT CHANGE UP (ref 0–0.2)
BASOPHILS NFR BLD AUTO: 0 % — SIGNIFICANT CHANGE UP (ref 0–2)
BILIRUB SERPL-MCNC: 0.2 MG/DL — SIGNIFICANT CHANGE UP (ref 0.2–1.2)
BUN SERPL-MCNC: 23 MG/DL — SIGNIFICANT CHANGE UP (ref 7–23)
CALCIUM SERPL-MCNC: 9.1 MG/DL — SIGNIFICANT CHANGE UP (ref 8.4–10.5)
CHLORIDE SERPL-SCNC: 96 MMOL/L — SIGNIFICANT CHANGE UP (ref 96–108)
CO2 SERPL-SCNC: 23 MMOL/L — SIGNIFICANT CHANGE UP (ref 22–31)
CREAT SERPL-MCNC: 1.05 MG/DL — SIGNIFICANT CHANGE UP (ref 0.5–1.3)
EOSINOPHIL # BLD AUTO: 0 K/UL — SIGNIFICANT CHANGE UP (ref 0–0.5)
EOSINOPHIL NFR BLD AUTO: 0 % — SIGNIFICANT CHANGE UP (ref 0–6)
GLUCOSE BLDC GLUCOMTR-MCNC: 180 MG/DL — HIGH (ref 70–99)
GLUCOSE BLDC GLUCOMTR-MCNC: 190 MG/DL — HIGH (ref 70–99)
GLUCOSE BLDC GLUCOMTR-MCNC: 211 MG/DL — HIGH (ref 70–99)
GLUCOSE BLDC GLUCOMTR-MCNC: 260 MG/DL — HIGH (ref 70–99)
GLUCOSE SERPL-MCNC: 241 MG/DL — HIGH (ref 70–99)
HCT VFR BLD CALC: 33.5 % — LOW (ref 34.5–45)
HGB BLD-MCNC: 11 G/DL — LOW (ref 11.5–15.5)
IMM GRANULOCYTES NFR BLD AUTO: 0.5 % — SIGNIFICANT CHANGE UP (ref 0–1.5)
INR BLD: 1.56 RATIO — HIGH (ref 0.88–1.16)
LYMPHOCYTES # BLD AUTO: 0.96 K/UL — LOW (ref 1–3.3)
LYMPHOCYTES # BLD AUTO: 7.1 % — LOW (ref 13–44)
MCHC RBC-ENTMCNC: 29.1 PG — SIGNIFICANT CHANGE UP (ref 27–34)
MCHC RBC-ENTMCNC: 32.8 GM/DL — SIGNIFICANT CHANGE UP (ref 32–36)
MCV RBC AUTO: 88.6 FL — SIGNIFICANT CHANGE UP (ref 80–100)
MONOCYTES # BLD AUTO: 1.05 K/UL — HIGH (ref 0–0.9)
MONOCYTES NFR BLD AUTO: 7.8 % — SIGNIFICANT CHANGE UP (ref 2–14)
NEUTROPHILS # BLD AUTO: 11.41 K/UL — HIGH (ref 1.8–7.4)
NEUTROPHILS NFR BLD AUTO: 84.6 % — HIGH (ref 43–77)
PLATELET # BLD AUTO: 292 K/UL — SIGNIFICANT CHANGE UP (ref 150–400)
POTASSIUM SERPL-MCNC: 3.8 MMOL/L — SIGNIFICANT CHANGE UP (ref 3.5–5.3)
POTASSIUM SERPL-SCNC: 3.8 MMOL/L — SIGNIFICANT CHANGE UP (ref 3.5–5.3)
PROT SERPL-MCNC: 6.5 G/DL — SIGNIFICANT CHANGE UP (ref 6–8.3)
PROTHROM AB SERPL-ACNC: 17 SEC — HIGH (ref 9.8–12.7)
RBC # BLD: 3.78 M/UL — LOW (ref 3.8–5.2)
RBC # FLD: 15.3 % — HIGH (ref 10.3–14.5)
SODIUM SERPL-SCNC: 132 MMOL/L — LOW (ref 135–145)
WBC # BLD: 13.49 K/UL — HIGH (ref 3.8–10.5)
WBC # FLD AUTO: 13.49 K/UL — HIGH (ref 3.8–10.5)

## 2018-02-12 PROCEDURE — 72125 CT NECK SPINE W/O DYE: CPT | Mod: 26

## 2018-02-12 RX ORDER — WARFARIN SODIUM 2.5 MG/1
5 TABLET ORAL ONCE
Qty: 0 | Refills: 0 | Status: COMPLETED | OUTPATIENT
Start: 2018-02-12 | End: 2018-02-12

## 2018-02-12 RX ADMIN — Medication 2: at 17:28

## 2018-02-12 RX ADMIN — Medication 100 MILLIGRAM(S): at 17:30

## 2018-02-12 RX ADMIN — Medication 3 MILLILITER(S): at 00:04

## 2018-02-12 RX ADMIN — Medication 1000 UNIT(S): at 12:49

## 2018-02-12 RX ADMIN — Medication 3 MILLILITER(S): at 23:16

## 2018-02-12 RX ADMIN — Medication 3 MILLILITER(S): at 06:20

## 2018-02-12 RX ADMIN — Medication 100 MILLIGRAM(S): at 02:33

## 2018-02-12 RX ADMIN — Medication 100 MILLIGRAM(S): at 10:25

## 2018-02-12 RX ADMIN — Medication 1: at 12:44

## 2018-02-12 RX ADMIN — Medication 200 MILLIGRAM(S): at 06:20

## 2018-02-12 RX ADMIN — UMECLIDINIUM BROMIDE AND VILANTEROL TRIFENATATE 1 PUFF(S): 62.5; 25 POWDER RESPIRATORY (INHALATION) at 08:20

## 2018-02-12 RX ADMIN — WARFARIN SODIUM 5 MILLIGRAM(S): 2.5 TABLET ORAL at 21:28

## 2018-02-12 RX ADMIN — Medication 3 MILLILITER(S): at 12:49

## 2018-02-12 RX ADMIN — Medication 650 MILLIGRAM(S): at 03:45

## 2018-02-12 RX ADMIN — Medication 3: at 08:19

## 2018-02-12 RX ADMIN — Medication 200 MILLIGRAM(S): at 17:30

## 2018-02-12 RX ADMIN — Medication 3 MILLILITER(S): at 17:29

## 2018-02-12 NOTE — PROGRESS NOTE ADULT - PROBLEM SELECTOR PLAN 1
so far no focal infectious etiology isolated as a reason for the fever  blood cx neg, rvp neg urine cx neg   echo no vegetations noted  urinary retention now with mcadams  she is off antibx now. and cont to monitor  CT with contrast does not show pathology that would be cause of fever  DVT can give fever, but seems that would be resolving by now.  has small b/l pleural effusions doubt significant  encourage oob  follow temp curve  would encourage oob  sed rate mildly elevated

## 2018-02-12 NOTE — PROGRESS NOTE ADULT - SUBJECTIVE AND OBJECTIVE BOX
Neurology  Progress Note  Subjective: Pt lying in bed, she denies new focal weakness, numbness, changes in speech, swallow, vision  EEG normal      Medications:  acetaminophen   Tablet 650 milliGRAM(s) Oral every 6 hours PRN  ALBUTerol/ipratropium for Nebulization 3 milliLiter(s) Nebulizer every 6 hours  cholecalciferol 1000 Unit(s) Oral daily  ciprofloxacin   IVPB 400 milliGRAM(s) IV Intermittent every 12 hours  dextrose 5%. 1000 milliLiter(s) IV Continuous <Continuous>  insulin lispro (HumaLOG) corrective regimen sliding scale   SubCutaneous at bedtime  insulin lispro (HumaLOG) corrective regimen sliding scale   SubCutaneous three times a day before meals  metroNIDAZOLE  IVPB 500 milliGRAM(s) IV Intermittent every 8 hours  sodium chloride 0.9%. 1000 milliLiter(s) IV Continuous <Continuous>  umeclidinium 62.5 MICROgram(s)/vilanterol 25 MICROgram(s) Inhaler 1 Puff(s) Inhalation daily      Labs:  CBC Full  -  ( 2018 07:38 )  WBC Count : 13.49 K/uL  Hemoglobin : 11.0 g/dL  Hematocrit : 33.5 %  Platelet Count - Automated : 292 K/uL  Mean Cell Volume : 88.6 fl  Mean Cell Hemoglobin : 29.1 pg  Mean Cell Hemoglobin Concentration : 32.8 gm/dL  Auto Neutrophil # : 11.41 K/uL  Auto Lymphocyte # : 0.96 K/uL  Auto Monocyte # : 1.05 K/uL  Auto Eosinophil # : 0.00 K/uL  Auto Basophil # : 0.00 K/uL  Auto Neutrophil % : 84.6 %  Auto Lymphocyte % : 7.1 %  Auto Monocyte % : 7.8 %  Auto Eosinophil % : 0.0 %  Auto Basophil % : 0.0 %        132<L>  |  96  |  23  ----------------------------<  241<H>  3.8   |  23  |  1.05    Ca    9.1      2018 07:49    TPro  6.5  /  Alb  2.5<L>  /  TBili  0.2  /  DBili  x   /  AST  24  /  ALT  33  /  AlkPhos  64  -12    CAPILLARY BLOOD GLUCOSE      POCT Blood Glucose.: 260 mg/dL (2018 07:28)  POCT Blood Glucose.: 262 mg/dL (2018 21:10)  POCT Blood Glucose.: 226 mg/dL (2018 16:40)  POCT Blood Glucose.: 243 mg/dL (2018 11:54)    LIVER FUNCTIONS - ( 2018 07:49 )  Alb: 2.5 g/dL / Pro: 6.5 g/dL / ALK PHOS: 64 U/L / ALT: 33 U/L / AST: 24 U/L / GGT: x           PT/INR - ( 2018 09:09 )   PT: 18.9 sec;   INR: 1.66 ratio           Urinalysis Basic - ( 2018 17:00 )    Color: Yellow / Appearance: Slightly Turbid / S.026 / pH: x  Gluc: x / Ketone: Negative  / Bili: Negative / Urobili: 1.0 mg/dL   Blood: x / Protein: 100 mg/dL / Nitrite: Negative   Leuk Esterase: Small / RBC: 8 /HPF / WBC 60 /HPF   Sq Epi: x / Non Sq Epi: 23 /HPF / Bacteria: Few          Vitals:  Vital Signs Last 24 Hrs  T(C): 36.8 (2018 06:32), Max: 39.2 (2018 12:40)  T(F): 98.2 (2018 06:32), Max: 102.5 (2018 12:40)  HR: 91 (2018 03:49) (82 - 91)  BP: 108/62 (2018 03:49) (108/62 - 134/71)  BP(mean): --  RR: 17 (2018 03:49) (17 - 18)  SpO2: 98% (2018 03:49) (98% - 99%)    NEUROLOGICAL EXAM:    Mental status: Awake, alert, and in no apparent distress. Oriented to self, hospital, 2018,  pt not happy to be here    Cranial Nerves: Pupils were equal, round, reactive to light. Extraocular movements were intact. Fundoscopic exam was deferred. Facial sensation was intact to light touch. There was no facial asymmetry. The palate was upgoing symmetrically and tongue was midline. Hearing acuity was intact to finger rub AU. Shoulder shrug was full bilaterally    Motor exam: Bulk and tone were normal.  grossly strong with limited cooperation    Reflexes: 2 in the bilateral upper extremities. 2 in the bilateral lower extremities. Toes were downgoing bilaterally.     Sensation: Intact to light touch, temperature.     Coordination: no gross dysmetria    Gait: deferred    < from: CT Head No Cont (18 @ 00:21) >    EXAM:  CT BRAIN                            PROCEDURE DATE:  2018      INTERPRETATION:  CLINICAL INFORMATION: Head trauma. Left leg weakness.    TECHNIQUE: Noncontrast axial CT images were acquired through the head.   Two-dimensional sagittal and coronal reformats were generated.    COMPARISON STUDY: CT head dated April 3, 2017    FINDINGS:     There is no CT evidence of acute intracranial hemorrhage or extra-axial   collection.    There is no CT evidence of an acute demarcated territorial infarct.    There are confluent periventricular white matter hypodensities compatible   with chronic microvascular-type change.     There is no vasogenic edema or mass effect.    The ventricles and sulci are normal in size and configuration forthe   patient's age.    The basal cisterns are patent.    There is mucosal disease in the left maxillary sinus. The mastoid air   cells and middle ear cavities are grossly clear.    The soft tissues of the scalp and face are unremarkable.    The bones of the calvarium, skull base, and face are unremarkable.    IMPRESSION:     No CT evidence of acute intracranial hemorrhage or mass effect.    No CT evidence of acute demarcated territorial infarct.     If the patient's symptoms persist, consider short interval follow-up head   CT or brain MRI if there are no MRI contraindications.    ALIRIO LINDSEY M.D., RADIOLOGY RESIDENT  This document has been electronically signed.  DOE LR M.D., ATTENDING RADIOLOGIST  This document has been electronically signed. 2018 12:57AM

## 2018-02-12 NOTE — PROGRESS NOTE ADULT - SUBJECTIVE AND OBJECTIVE BOX
[facility], Division of Infectious Diseases  MAYA Pyle A. Lee    Name: MARU VILLASEÑOR  Age: 82y  Gender: Female  MRN: [id number]    Interval History--  Notes reviewed  fevers over weekend   states she has neck pain for several days.   has decreased range of motion    Past Medical History--  Breast cancer  Lung cancer, left  Diabetes mellitus  Afib  S/P colectomy  Status post left hip replacement  S/P lobectomy of lung      For details regarding the patient's social history, family history, and other miscellaneous elements, please refer the initial infectious diseases consultation and/or the admitting history and physical examination for this admission.    Allergies    codeine (Fever; Rash)  contrast media (iodine-based) (Other)    Intolerances        Medications--  Antibiotics:  ciprofloxacin   IVPB 400 milliGRAM(s) IV Intermittent every 12 hours  metroNIDAZOLE  IVPB 500 milliGRAM(s) IV Intermittent every 8 hours    Immunologic:    Other:  acetaminophen   Tablet PRN  ALBUTerol/ipratropium for Nebulization  cholecalciferol  dextrose 5%.  insulin lispro (HumaLOG) corrective regimen sliding scale  insulin lispro (HumaLOG) corrective regimen sliding scale  sodium chloride 0.9%.  umeclidinium 62.5 MICROgram(s)/vilanterol 25 MICROgram(s) Inhaler      Review of Systems--  A 10-point review of systems was obtained.     Pertinent positives and negatives--  Constitutional: ++ fevers. No Chills. No Rigors.   Cardiovascular: No chest pain. No palpitations.  Respiratory: No shortness of breath. No cough.  Gastrointestinal: No nausea or vomiting. No diarrhea or constipation.   Psychiatric: Pleasant. Appropriate affect.    Review of systems otherwise negative except as previously noted.    Physical Examination--  Vital Signs: T(F): 98.6 (02-12-18 @ 11:10), Max: 102.5 (02-11-18 @ 12:40)  HR: 81 (02-12-18 @ 11:10)  BP: 121/72 (02-12-18 @ 11:10)  RR: 17 (02-12-18 @ 11:10)  SpO2: 100% (02-12-18 @ 11:10)  Wt(kg): --  General: Nontoxic-appearing Female in no acute distress.  HEENT: AT/NC. nicteric. Conjunctiva pink and moist. Oropharynx clear  Neck: tender back of neck unable to move back and forth and side to side  Nodes: None palpable.  Lungs: Clear bilaterally without rales, wheezing or rhonchi  Heart: Regular rate and rhythm. No Murmur. No rub. No gallop. No palpable thrill.  Abdomen: Bowel sounds present and normoactive. Soft. Nondistended. Nontender.   Extremities: No cyanosis or clubbing. trace edema.    mcadams  Skin: Warm. Dry. Good turgor. No rash. No vasculitic stigmata.  Psychiatric: Appropriate affect and mood for situation.         Laboratory Studies--  CBC                        11.0   13.49 )-----------( 292      ( 12 Feb 2018 07:38 )             33.5       Chemistries  02-12    132<L>  |  96  |  23  ----------------------------<  241<H>  3.8   |  23  |  1.05    Ca    9.1      12 Feb 2018 07:49    TPro  6.5  /  Alb  2.5<L>  /  TBili  0.2  /  DBili  x   /  AST  24  /  ALT  33  /  AlkPhos  64  02-12      Culture Data    Culture - Urine (collected 05 Feb 2018 14:23)  Source: .Urine Clean Catch (Midstream)  Final Report (06 Feb 2018 11:11):    No growth    Culture - Urine (02.05.18 @ 14:23)    Specimen Source: .Urine Clean Catch (Midstream)    Culture Results:   No growth          Culture - Blood (02.04.18 @ 05:55)    Specimen Source: .Blood Blood-Peripheral    Culture Results:   No growth at 5 days.    1Rapid Respiratory Viral Panel (02.11.18 @ 14:03)    Rapid RVP Result: NotDete: The [employer] RVP Rapid uses polymerase chain reaction (PCR) and melt  curve analysis to screen for adenovirus; coronavirus HKU1, NL63, 229E,  OC43; human metapneumovirus (hMPV); human enterovirus/rhinovirus  (Entero/RV); influenza A; influenza A/H1;influenza A/H3; influenza  A/H1-2009; influenza B; parainfluenza viruses 1, 2, 3, 4; respiratory  syncytial virus; Bordetella pertussis; Mycoplasma pneumoniae; and  Chlamydophila pneumoniae.    Urinalysis (02.11.18 @ 17:00)    pH Urine: 6.0    Glucose Qualitative, Urine: 500 mg/dL    Blood, Urine: Trace    Color: Yellow    Urine Appearance: Slightly Turbid    Bilirubin: Negative    Ketone - Urine: Negative    Specific Gravity: 1.026    Protein, Urine: 100 mg/dL    Urobilinogen: 1.0 mg/dL    Nitrite: Negative    Leukocyte Esterase Concentration: Small    < from: CT Head No Cont (02.04.18 @ 00:21) >    EXAM:  CT BRAIN                            PROCEDURE DATE:  02/04/2018            INTERPRETATION:  CLINICAL INFORMATION: Head trauma. Left leg weakness.    TECHNIQUE: Noncontrast axial CT images were acquired through the head.   Two-dimensional sagittal and coronal reformats were generated.    COMPARISON STUDY: CT head dated April 3, 2017    FINDINGS:     There is no CT evidence of acute intracranial hemorrhage or extra-axial   collection.    There is no CT evidence of an acute demarcated territorial infarct.    There are confluent periventricular white matter hypodensities compatible   with chronic microvascular-type change.     There is no vasogenic edema or mass effect.    The ventricles and sulci are normal in size and configuration forthe   patient's age.    The basal cisterns are patent.    There is mucosal disease in the left maxillary sinus. The mastoid air   cells and middle ear cavities are grossly clear.    The soft tissues of the scalp and face are unremarkable.    The bones of the calvarium, skull base, and face are unremarkable.    IMPRESSION:     No CT evidence of acute intracranial hemorrhage or mass effect.    No CT evidence of acute demarcated territorial infarct.     If the patient's symptoms persist, consider short interval follow-up head   CT or brain MRI if there are no MRI contraindications.        < end of copied text >

## 2018-02-13 DIAGNOSIS — K62.89 OTHER SPECIFIED DISEASES OF ANUS AND RECTUM: ICD-10-CM

## 2018-02-13 LAB
-  AMIKACIN: SIGNIFICANT CHANGE UP
-  AMPICILLIN: SIGNIFICANT CHANGE UP
-  AZTREONAM: SIGNIFICANT CHANGE UP
-  CEFEPIME: SIGNIFICANT CHANGE UP
-  CEFTAZIDIME: SIGNIFICANT CHANGE UP
-  CIPROFLOXACIN: SIGNIFICANT CHANGE UP
-  CIPROFLOXACIN: SIGNIFICANT CHANGE UP
-  GENTAMICIN: SIGNIFICANT CHANGE UP
-  IMIPENEM: SIGNIFICANT CHANGE UP
-  LEVOFLOXACIN: SIGNIFICANT CHANGE UP
-  MEROPENEM: SIGNIFICANT CHANGE UP
-  NITROFURANTOIN: SIGNIFICANT CHANGE UP
-  PIPERACILLIN/TAZOBACTAM: SIGNIFICANT CHANGE UP
-  TETRACYCLINE: SIGNIFICANT CHANGE UP
-  TOBRAMYCIN: SIGNIFICANT CHANGE UP
-  VANCOMYCIN: SIGNIFICANT CHANGE UP
ANION GAP SERPL CALC-SCNC: 13 MMOL/L — SIGNIFICANT CHANGE UP (ref 5–17)
BUN SERPL-MCNC: 22 MG/DL — SIGNIFICANT CHANGE UP (ref 7–23)
CALCIUM SERPL-MCNC: 9 MG/DL — SIGNIFICANT CHANGE UP (ref 8.4–10.5)
CHLORIDE SERPL-SCNC: 97 MMOL/L — SIGNIFICANT CHANGE UP (ref 96–108)
CO2 SERPL-SCNC: 24 MMOL/L — SIGNIFICANT CHANGE UP (ref 22–31)
CREAT SERPL-MCNC: 0.77 MG/DL — SIGNIFICANT CHANGE UP (ref 0.5–1.3)
CULTURE RESULTS: SIGNIFICANT CHANGE UP
GLUCOSE BLDC GLUCOMTR-MCNC: 180 MG/DL — HIGH (ref 70–99)
GLUCOSE BLDC GLUCOMTR-MCNC: 206 MG/DL — HIGH (ref 70–99)
GLUCOSE BLDC GLUCOMTR-MCNC: 253 MG/DL — HIGH (ref 70–99)
GLUCOSE BLDC GLUCOMTR-MCNC: 286 MG/DL — HIGH (ref 70–99)
GLUCOSE SERPL-MCNC: 200 MG/DL — HIGH (ref 70–99)
HCT VFR BLD CALC: 33.9 % — LOW (ref 34.5–45)
HGB BLD-MCNC: 11.3 G/DL — LOW (ref 11.5–15.5)
INR BLD: 1.64 RATIO — HIGH (ref 0.88–1.16)
MAGNESIUM SERPL-MCNC: 2.2 MG/DL — SIGNIFICANT CHANGE UP (ref 1.6–2.6)
MCHC RBC-ENTMCNC: 30.6 PG — SIGNIFICANT CHANGE UP (ref 27–34)
MCHC RBC-ENTMCNC: 33.4 GM/DL — SIGNIFICANT CHANGE UP (ref 32–36)
MCV RBC AUTO: 91.7 FL — SIGNIFICANT CHANGE UP (ref 80–100)
METHOD TYPE: SIGNIFICANT CHANGE UP
METHOD TYPE: SIGNIFICANT CHANGE UP
ORGANISM # SPEC MICROSCOPIC CNT: SIGNIFICANT CHANGE UP
PLATELET # BLD AUTO: 309 K/UL — SIGNIFICANT CHANGE UP (ref 150–400)
POTASSIUM SERPL-MCNC: 4.1 MMOL/L — SIGNIFICANT CHANGE UP (ref 3.5–5.3)
POTASSIUM SERPL-SCNC: 4.1 MMOL/L — SIGNIFICANT CHANGE UP (ref 3.5–5.3)
PROTHROM AB SERPL-ACNC: 17.9 SEC — HIGH (ref 9.8–12.7)
RBC # BLD: 3.7 M/UL — LOW (ref 3.8–5.2)
RBC # FLD: 13.2 % — SIGNIFICANT CHANGE UP (ref 10.3–14.5)
SODIUM SERPL-SCNC: 134 MMOL/L — LOW (ref 135–145)
SPECIMEN SOURCE: SIGNIFICANT CHANGE UP
WBC # BLD: 10.6 K/UL — HIGH (ref 3.8–10.5)
WBC # FLD AUTO: 10.6 K/UL — HIGH (ref 3.8–10.5)

## 2018-02-13 PROCEDURE — 99232 SBSQ HOSP IP/OBS MODERATE 35: CPT

## 2018-02-13 RX ORDER — IBUPROFEN 200 MG
400 TABLET ORAL EVERY 8 HOURS
Qty: 0 | Refills: 0 | Status: DISCONTINUED | OUTPATIENT
Start: 2018-02-13 | End: 2018-02-20

## 2018-02-13 RX ORDER — LIDOCAINE 4 G/100G
1 CREAM TOPICAL DAILY
Qty: 0 | Refills: 0 | Status: DISCONTINUED | OUTPATIENT
Start: 2018-02-13 | End: 2018-02-20

## 2018-02-13 RX ORDER — WARFARIN SODIUM 2.5 MG/1
5 TABLET ORAL ONCE
Qty: 0 | Refills: 0 | Status: COMPLETED | OUTPATIENT
Start: 2018-02-13 | End: 2018-02-13

## 2018-02-13 RX ORDER — ACETAMINOPHEN 500 MG
650 TABLET ORAL ONCE
Qty: 0 | Refills: 0 | Status: COMPLETED | OUTPATIENT
Start: 2018-02-13 | End: 2018-02-13

## 2018-02-13 RX ADMIN — Medication 1000 UNIT(S): at 12:43

## 2018-02-13 RX ADMIN — UMECLIDINIUM BROMIDE AND VILANTEROL TRIFENATATE 1 PUFF(S): 62.5; 25 POWDER RESPIRATORY (INHALATION) at 10:57

## 2018-02-13 RX ADMIN — Medication 2: at 08:07

## 2018-02-13 RX ADMIN — Medication 3 MILLILITER(S): at 18:20

## 2018-02-13 RX ADMIN — Medication 100 MILLIGRAM(S): at 21:39

## 2018-02-13 RX ADMIN — Medication 1: at 12:06

## 2018-02-13 RX ADMIN — Medication 200 MILLIGRAM(S): at 18:20

## 2018-02-13 RX ADMIN — WARFARIN SODIUM 5 MILLIGRAM(S): 2.5 TABLET ORAL at 21:39

## 2018-02-13 RX ADMIN — Medication 3 MILLILITER(S): at 05:08

## 2018-02-13 RX ADMIN — Medication 100 MILLIGRAM(S): at 01:30

## 2018-02-13 RX ADMIN — Medication 3 MILLILITER(S): at 23:53

## 2018-02-13 RX ADMIN — LIDOCAINE 1 PATCH: 4 CREAM TOPICAL at 14:53

## 2018-02-13 RX ADMIN — Medication 400 MILLIGRAM(S): at 23:52

## 2018-02-13 RX ADMIN — Medication 1: at 21:48

## 2018-02-13 RX ADMIN — Medication 3: at 18:16

## 2018-02-13 RX ADMIN — Medication 100 MILLIGRAM(S): at 10:57

## 2018-02-13 RX ADMIN — Medication 3 MILLILITER(S): at 12:47

## 2018-02-13 RX ADMIN — Medication 200 MILLIGRAM(S): at 05:08

## 2018-02-13 NOTE — ADVANCED PRACTICE NURSE CONSULT - REASON FOR CONSULT
Requested by nursing staff to assess skin status on left heel.  Patient is a 83 y/o F--patient seen with adult daughter and son, son in law in attendance--admitted on 2/4/2018 with  a history of lung CA with past LEFT lower lobectomy for lung CA presumed to be in remission, COPD, malignant melanoma, breast CA presumed to be disease free, type 2 DM but not on current treatment, past RIGHT DVT on Coumadin with recent discharge from [place] in January 2018 for a LEFT leg DVT with patient resumed on Coumadin per patient's preference, with patient apparently initially declining rehab referral from last admission.  Patient brought in by daughter following patient calling for help when in a plank position at home after patient was attempting to break a fall.  Patient with fever, chills, rigors at home.  Patient with increased urination.  No HA, no focal weakness.  No LOC or head trauma.  Daughter was concerned if patient had an ictal episode with patient with (?) trismus.  No faecal or urinary incontinence.  No back pain, no tearing back pain.  No hip or extremity pain.  No abdominal pain, no red blood per rectum or melena.  Remaining review of systems not contributory.  Patient is currently on 4 Noel and was seen by the wound care team.

## 2018-02-13 NOTE — PROGRESS NOTE ADULT - PROBLEM SELECTOR PLAN 1
so far no focal infectious etiology isolated as a reason for the fever  blood cx neg, rvp neg urine cx neg   echo no vegetations noted  urinary retention now with mcadams  CT with contrast does not show pathology that would be cause of fever  DVT can give fever, but seems that would be resolving by now.  has small b/l pleural effusions doubt significant  encourage oob  follow temp curve  would encourage oob  sed rate mildly elevated  now afebrile > 24 hours

## 2018-02-13 NOTE — ADVANCED PRACTICE NURSE CONSULT - ASSESSMENT
Patient is on a [employer] P 500 low airloss surface and is being assisted with turning and positioning.  She appears weak and is asking if anyone has figured out what is wrong with her.  She has pain from her neck down.  Patient's left foot is warm to the touch had no pain during assessment of foot.  A small area of hyperpigmented tissue is noted on the left heel measuring approximately 1.0 cm X 1.5 cm X 0 cm.  Texture of the skin is unremarkable and no drainage nor odor is noted.  The periwound skin is intact.  Unable to discern if this is a pressure ulcer or a tissue alteration due to the DVT in the leg.  Will recommend applying Cavilon to cover and protect the site and usage of Zflo heel off-loading boots while in bed.

## 2018-02-13 NOTE — PROGRESS NOTE ADULT - SUBJECTIVE AND OBJECTIVE BOX
Wound SURGERY CONSULT NOTE    FROM:   FOR:   Reason for Consult:    HPI:   81 y/o  Diabetic female--patient with a history of lung CA with past LEFT lower lobectomy for lung CA presumed to be in remission, COPD, malignant melanoma, breast CA with past tylectomy presumed to be disease free, type 2 DM but not on current treatment, past RIGHT DVT on Coumadin with recent discharge from [place] in 2018 for a LEFT leg DVT with patient resumed on Coumadin per patient's preference, with patient apparently initially declining rehab referral from last admission, with patient brought in by daughter following patient calling for help following daughter noted patient to be in a plank position at home after patient was attempting to break a fall.  Patient with fever, chills, rigors at home.  Patient with increased urination.  No HA, no focal weakness.  No LOC or head trauma.  Daughter was concerned if patient had an ictal episode with patient with (?) trismus.  No faecal or urinary incontinence.  No back pain, no tearing back pain.  No hip or extremity pain.  No abdominal pain, no red blood per rectum or melena.  Remaining review of systems not contributory. (2018 03:03)      PAST MEDICAL & SURGICAL HISTORY:  Breast cancer  Lung cancer, left  Diabetes mellitus: diet controlled diabetes  Afib  S/P colectomy  Status post left hip replacement  S/P lobectomy of lung: s/p LLL Lobectomy 2014 for lung CA    history of cigarette smoking offered      General	 frustration with not feeling well    Skin/Breast:  	  Ophthalmologic:   	  ENMT:	    Respiratory and Thorax:   	  Cardiovascular:	    Gastrointestinal:	    Genitourinary:	    Musculoskeletal:	  discomfort left leg, recent fall    Neurological:	    Psychiatric:	    Hematology/Lymphatics:	    Endocrine    MEDICATIONS  (STANDING):  ALBUTerol/ipratropium for Nebulization 3 milliLiter(s) Nebulizer every 6 hours  cholecalciferol 1000 Unit(s) Oral daily  ciprofloxacin   IVPB 400 milliGRAM(s) IV Intermittent every 12 hours  dextrose 5%. 1000 milliLiter(s) (50 mL/Hr) IV Continuous <Continuous>  insulin lispro (HumaLOG) corrective regimen sliding scale   SubCutaneous at bedtime  insulin lispro (HumaLOG) corrective regimen sliding scale   SubCutaneous three times a day before meals  lidocaine   Patch 1 Patch Transdermal daily  metroNIDAZOLE  IVPB 500 milliGRAM(s) IV Intermittent every 8 hours  sodium chloride 0.9%. 1000 milliLiter(s) (50 mL/Hr) IV Continuous <Continuous>  umeclidinium 62.5 MICROgram(s)/vilanterol 25 MICROgram(s) Inhaler 1 Puff(s) Inhalation daily  warfarin 5 milliGRAM(s) Oral once    MEDICATIONS  (PRN):  acetaminophen   Tablet 650 milliGRAM(s) Oral every 6 hours PRN For Temp greater than 38 C (100.4 F)  ibuprofen  Tablet 400 milliGRAM(s) Oral every 8 hours PRN pain      Allergies    codeine (Fever; Rash)  contrast media (iodine-based) (Other)    Intolerances        SOCIAL HISTORY: H/o smoking cigarettes     FAMILY HISTORY:  Family history of lung cancer (Sibling)  Family history of secondary cancer of bone and bone marrow  Family history of dementia      Vital Signs Last 24 Hrs  T(C): 37.4 (2018 13:27), Max: 37.6 (2018 21:01)  T(F): 99.3 (2018 13:27), Max: 99.7 (2018 21:01)  HR: 95 (:) (85 - 95)  BP: 138/65 (2018 13:) (122/68 - 138/65)  BP(mean): --  RR: 18 (2018 13:27) (17 - 18)  SpO2: 98% (2018 13:27) (96% - 98%)    PHYSICAL EXAM:      Constitutional: at bedrest, using nasal O2    Eyes: no jaundice    ENMT:    Neck: no rigidity    Breasts: h/o breast surgery    Back: no wounds    Respiratory: no wheezing- using nasal O2    Cardiovascular:    Gastrointestinal:    Genitourinary: Galan    Rectal:    Extremities: small probable DTI of left heel, left foot is warm, limited movement of left leg, no foot drop  , left leg is minimally swollen, non tender    Vascular:    Neurological: awake    Skin:    Lymph Nodes:    Musculoskeletal:    Psychiatric    LABS:                        11.3   10.6  )-----------( 309      ( 2018 10:40 )             33.9     02-13    134<L>  |  97  |  22  ----------------------------<  200<H>  4.1   |  24  |  0.77    Ca    9.0      2018 07:43  Mg     2.2         TPro  6.5  /  Alb  2.5<L>  /  TBili  0.2  /  DBili  x   /  AST  24  /  ALT  33  /  AlkPhos  64      PT/INR - ( 2018 10:40 )   PT: 17.9 sec;   INR: 1.64 ratio         PTT - ( 2018 15:20 )  PTT:31.5 sec  Urinalysis Basic - ( 2018 17:00 )    Color: Yellow / Appearance: Slightly Turbid / S.026 / pH: x  Gluc: x / Ketone: Negative  / Bili: Negative / Urobili: 1.0 mg/dL   Blood: x / Protein: 100 mg/dL / Nitrite: Negative   Leuk Esterase: Small / RBC: 8 /HPF / WBC 60 /HPF   Sq Epi: x / Non Sq Epi: 23 /HPF / Bacteria: Few        Albumin, Serum: 2.5 g/dL ( @ 07:49)  Albumin, Serum: 2.9 g/dL ( @ 14:17)  Albumin, Serum: 2.6 g/dL ( @ 07:39)      HgA1c  - @ 06:34  6.1  - @ 08:43  6.3      Status d/w patient Z float boot applie to left foot  Suggest baseline Anuj  OP f/u info given

## 2018-02-13 NOTE — PROGRESS NOTE ADULT - SUBJECTIVE AND OBJECTIVE BOX
Denies any fevers, chills, cough, new abd pain, diarrhea, monoarticular pain, rectal pain during defecation, dysuria/ urinary urgency    Vital Signs Last 24 Hrs  T(C): 37.2 (2018 04:10), Max: 37.6 (2018 21:01)  T(F): 98.9 (2018 04:10), Max: 99.7 (2018 21:01)  HR: 85 (2018 04:10) (85 - 88)  BP: 122/68 (2018 04:10) (122/68 - 122/69)  BP(mean): --  RR: 18 (2018 04:10) (17 - 18)  SpO2: 98% (2018 04:10) (96% - 98%)    GENERAL: NAD, AAOx3  HEAD:  Atraumatic, Normocephalic  EYES: EOMI  NECK: Supple, No JVD, No LAD  CHEST/LUNG: Clear to auscultation bilaterally  HEART: Regular rate and rhythm; nl S1/S2; No murmurs, rubs, or gallops  ABDOMEN: Soft, Nontender, Nondistended; Bowel sounds present  EXTREMITIES:  2+ Peripheral Pulses, 2+ b/l LE edema  SKIN: No rashes or lesions    LABS:                        11.3   10.6  )-----------( 309      ( 2018 10:40 )             33.9     02-13    134<L>  |  97  |  22  ----------------------------<  200<H>  4.1   |  24  |  0.77    Ca    9.0      2018 07:43  Mg     2.2     02-13    TPro  6.5  /  Alb  2.5<L>  /  TBili  0.2  /  DBili  x   /  AST  24  /  ALT  33  /  AlkPhos  64  02-12    PT/INR - ( 2018 10:40 )   PT: 17.9 sec;   INR: 1.64 ratio         PTT - ( 2018 15:20 )  PTT:31.5 sec  CAPILLARY BLOOD GLUCOSE      POCT Blood Glucose.: 180 mg/dL (2018 11:52)  POCT Blood Glucose.: 206 mg/dL (2018 08:02)  POCT Blood Glucose.: 180 mg/dL (2018 21:52)  POCT Blood Glucose.: 211 mg/dL (2018 17:03)        Urinalysis Basic - ( 2018 17:00 )    Color: Yellow / Appearance: Slightly Turbid / S.026 / pH: x  Gluc: x / Ketone: Negative  / Bili: Negative / Urobili: 1.0 mg/dL   Blood: x / Protein: 100 mg/dL / Nitrite: Negative   Leuk Esterase: Small / RBC: 8 /HPF / WBC 60 /HPF   Sq Epi: x / Non Sq Epi: 23 /HPF / Bacteria: Few

## 2018-02-13 NOTE — ADVANCED PRACTICE NURSE CONSULT - RECOMMEDATIONS
1.  Cavilon to left heel daily and prn  2.  Zflo heel off-loading boots while in bed  3.  Continue to assist with turning and positioning  Remain available as requested by staff

## 2018-02-13 NOTE — PROGRESS NOTE ADULT - SUBJECTIVE AND OBJECTIVE BOX
Neurology  Progress Note  Subjective: Pt lying in bed, complains of neck, shoulder pain  CT C spine performed, reviewed with neuroradiology no significant findings.     Medications:  acetaminophen   Tablet 650 milliGRAM(s) Oral every 6 hours PRN  ALBUTerol/ipratropium for Nebulization 3 milliLiter(s) Nebulizer every 6 hours  cholecalciferol 1000 Unit(s) Oral daily  ciprofloxacin   IVPB 400 milliGRAM(s) IV Intermittent every 12 hours  dextrose 5%. 1000 milliLiter(s) IV Continuous <Continuous>  insulin lispro (HumaLOG) corrective regimen sliding scale   SubCutaneous at bedtime  insulin lispro (HumaLOG) corrective regimen sliding scale   SubCutaneous three times a day before meals  metroNIDAZOLE  IVPB 500 milliGRAM(s) IV Intermittent every 8 hours  sodium chloride 0.9%. 1000 milliLiter(s) IV Continuous <Continuous>  umeclidinium 62.5 MICROgram(s)/vilanterol 25 MICROgram(s) Inhaler 1 Puff(s) Inhalation daily  warfarin 5 milliGRAM(s) Oral once    Labs:  CBC Full  -  ( 2018 10:40 )  WBC Count : 10.6 K/uL  Hemoglobin : 11.3 g/dL  Hematocrit : 33.9 %  Platelet Count - Automated : 309 K/uL  Mean Cell Volume : 91.7 fl  Mean Cell Hemoglobin : 30.6 pg  Mean Cell Hemoglobin Concentration : 33.4 gm/dL  Auto Neutrophil # : x  Auto Lymphocyte # : x  Auto Monocyte # : x  Auto Eosinophil # : x  Auto Basophil # : x  Auto Neutrophil % : x  Auto Lymphocyte % : x  Auto Monocyte % : x  Auto Eosinophil % : x  Auto Basophil % : x    02-13    134<L>  |  97  |  22  ----------------------------<  200<H>  4.1   |  24  |  0.77    Ca    9.0      2018 07:43  Mg     2.2     -    TPro  6.5  /  Alb  2.5<L>  /  TBili  0.2  /  DBili  x   /  AST  24  /  ALT  33  /  AlkPhos  64  02-12    POCT Blood Glucose.: 206 mg/dL (2018 08:02)  POCT Blood Glucose.: 180 mg/dL (2018 21:52)  POCT Blood Glucose.: 211 mg/dL (2018 17:03)  POCT Blood Glucose.: 190 mg/dL (2018 11:46)    LIVER FUNCTIONS - ( 2018 07:49 )  Alb: 2.5 g/dL / Pro: 6.5 g/dL / ALK PHOS: 64 U/L / ALT: 33 U/L / AST: 24 U/L / GGT: x         PT/INR - ( 2018 10:40 )   PT: 17.9 sec;   INR: 1.64 ratio    PTT - ( 2018 15:20 )  PTT:31.5 sec  Urinalysis Basic - ( 2018 17:00 )    Color: Yellow / Appearance: Slightly Turbid / S.026 / pH: x  Gluc: x / Ketone: Negative  / Bili: Negative / Urobili: 1.0 mg/dL   Blood: x / Protein: 100 mg/dL / Nitrite: Negative   Leuk Esterase: Small / RBC: 8 /HPF / WBC 60 /HPF   Sq Epi: x / Non Sq Epi: 23 /HPF / Bacteria: Few    Vitals:  Vital Signs Last 24 Hrs  T(C): 37.2 (2018 04:10), Max: 37.6 (2018 21:01)  T(F): 98.9 (2018 04:10), Max: 99.7 (2018 21:01)  HR: 85 (2018 04:10) (85 - 88)  BP: 122/68 (2018 04:10) (122/68 - 122/69)  BP(mean): --  RR: 18 (2018 04:10) (17 - 18)  SpO2: 98% (2018 04:10) (96% - 98%)    NEUROLOGICAL EXAM:    Mental status: Awake, alert, and in no apparent distress. Oriented to self, hospital, 2018,  pt not happy to be here  Cranial Nerves: Pupils were equal, round, reactive to light. Extraocular movements were intact. Fundoscopic exam was deferred. Facial sensation was intact to light touch. There was no facial asymmetry. The palate was upgoing symmetrically and tongue was midline. Hearing acuity was intact to finger rub AU. Shoulder shrug was full bilaterally  Motor exam: Bulk and tone were normal.  grossly strong with limited cooperation  Reflexes: 2 in the bilateral upper extremities. 2 in the bilateral lower extremities. Toes were downgoing bilaterally.   Sensation: Intact to light touch, temperature.   Coordination: no gross dysmetria  Gait: deferred    < from: CT Head No Cont (18 @ 00:21) >    EXAM:  CT BRAIN                            PROCEDURE DATE:  2018      INTERPRETATION:  CLINICAL INFORMATION: Head trauma. Left leg weakness.    TECHNIQUE: Noncontrast axial CT images were acquired through the head.   Two-dimensional sagittal and coronal reformats were generated.    COMPARISON STUDY: CT head dated April 3, 2017    FINDINGS:     There is no CT evidence of acute intracranial hemorrhage or extra-axial   collection.    There is no CT evidence of an acute demarcated territorial infarct.    There are confluent periventricular white matter hypodensities compatible   with chronic microvascular-type change.     There is no vasogenic edema or mass effect.    The ventricles and sulci are normal in size and configuration forthe   patient's age.    The basal cisterns are patent.    There is mucosal disease in the left maxillary sinus. The mastoid air   cells and middle ear cavities are grossly clear.    The soft tissues of the scalp and face are unremarkable.    The bones of the calvarium, skull base, and face are unremarkable.    IMPRESSION:     No CT evidence of acute intracranial hemorrhage or mass effect.    No CT evidence of acute demarcated territorial infarct.     If the patient's symptoms persist, consider short interval follow-up head   CT or brain MRI if there are no MRI contraindications.    ALIRIO LINDSEY M.D., RADIOLOGY RESIDENT  This document has been electronically signed.  DOE LR M.D., ATTENDING RADIOLOGIST  This document has been electronically signed. 2018 12:57AM

## 2018-02-13 NOTE — PROGRESS NOTE ADULT - SUBJECTIVE AND OBJECTIVE BOX
[facility], Division of Infectious Diseases  MAYA Pyle A. Lee  [phone]    Name: MARU VILLASEÑOR  Age: 82y  Gender: Female  MRN: [id number]    Interval History--  Notes reviewed  pt only complaint in neck pain and headache.  no diarrhea, + mcadams catheter    Past Medical History--  Breast cancer  Lung cancer, left  Diabetes mellitus  Afib  S/P colectomy  Status post left hip replacement  S/P lobectomy of lung      For details regarding the patient's social history, family history, and other miscellaneous elements, please refer the initial infectious diseases consultation and/or the admitting history and physical examination for this admission.    Allergies    codeine (Fever; Rash)  contrast media (iodine-based) (Other)    Intolerances        Medications--  Antibiotics:  ciprofloxacin   IVPB 400 milliGRAM(s) IV Intermittent every 12 hours  metroNIDAZOLE  IVPB 500 milliGRAM(s) IV Intermittent every 8 hours    Immunologic:    Other:  acetaminophen   Tablet PRN  ALBUTerol/ipratropium for Nebulization  cholecalciferol  dextrose 5%.  insulin lispro (HumaLOG) corrective regimen sliding scale  insulin lispro (HumaLOG) corrective regimen sliding scale  sodium chloride 0.9%.  umeclidinium 62.5 MICROgram(s)/vilanterol 25 MICROgram(s) Inhaler      Review of Systems--  A 10-point review of systems was obtained.     Pertinent positives and negatives--  Constitutional: No fevers. No Chills. No Rigors.   Cardiovascular: No chest pain. No palpitations.  Respiratory: No shortness of breath. No cough.  Gastrointestinal: No nausea or vomiting. No diarrhea or constipation.   Psychiatric: + depression    Review of systems otherwise negative except as previously noted.    Physical Examination--  Vital Signs: T(F): 98.9 (02-13-18 @ 04:10), Max: 99.7 (02-12-18 @ 21:01)  HR: 85 (02-13-18 @ 04:10)  BP: 122/68 (02-13-18 @ 04:10)  RR: 18 (02-13-18 @ 04:10)  SpO2: 98% (02-13-18 @ 04:10)  Wt(kg): --  General: Nontoxic-appearing Female in no acute distress.  HEENT: AT/NC. Anicteric. Conjunctiva pink and moist. Oropharynx clear. Dentition fair.  Neck: Not rigid. but decreased range of motion tender  Nodes: None palpable.  Lungs: Clear bilaterally without rales, wheezing or rhonchi  Heart: Regular rate and rhythm. No Murmur. No rub. No gallop. No palpable thrill.  Abdomen: Bowel sounds present and normoactive. Soft. Nondistended. Nontender.   Back: No spinal tenderness. No costovertebral angle tenderness.   Extremities: No cyanosis or clubbing. No edema.    mcadams catheter  Skin: Warm. Dry. Good turgor. No rash. No vasculitic stigmata.  Psychiatric: Appropriate affect and mood for situation.         Laboratory Studies--  CBC                        11.0   13.49 )-----------( 292      ( 12 Feb 2018 07:38 )             33.5       Chemistries  02-13    134<L>  |  97  |  22  ----------------------------<  200<H>  4.1   |  24  |  0.77    Ca    9.0      13 Feb 2018 07:43  Mg     2.2     02-13    TPro  6.5  /  Alb  2.5<L>  /  TBili  0.2  /  DBili  x   /  AST  24  /  ALT  33  /  AlkPhos  64  02-12      Culture Data    Culture - Urine (collected 11 Feb 2018 18:27)  Source: .Urine Catheterized  Preliminary Report (12 Feb 2018 19:09):    >100,000 CFU/ml Enterococcus faecalis    10,000 - 49,000 CFU/mL Pseudomonas aeruginosa    Culture - Blood (collected 11 Feb 2018 17:21)  Source: .Blood Blood-Peripheral  Preliminary Report (12 Feb 2018 18:01):    No growth to date.    Culture - Blood (collected 11 Feb 2018 17:21)  Source: .Blood Blood-Peripheral  Preliminary Report (12 Feb 2018 18:01):    No growth to date.          Rapid Respiratory Viral Panel (02.11.18 @ 14:03)    Rapid RVP Result: NotDetec: The FilmArray RVP Rapid uses polymerase chain reaction (PCR) and melt  curve analysis to screen for adenovirus; coronavirus HKU1, NL63, 229E,  OC43; human metapneumovirus (hMPV); human enterovirus/rhinovirus  (Entero/RV); influenza A; influenza A/H1;influenza A/H3; influenza  A/H1-2009; influenza B; parainfluenza viruses 1, 2, 3, 4; respiratory  syncytial virus; Bordetella pertussis; Mycoplasma pneumoniae; and  Chlamydophila pneumoniae.        Culture - Urine (02.11.18 @ 18:27)    Specimen Source: .Urine Catheterized    Culture Results:   >100,000 CFU/ml Enterococcus faecalis  10,000 - 49,000 CFU/mL Pseudomonas aeruginosa      Urinalysis (02.11.18 @ 17:00)    Glucose Qualitative, Urine: 500 mg/dL    Blood, Urine: Trace    pH Urine: 6.0    Color: Yellow    Urine Appearance: Slightly Turbid    Bilirubin: Negative    Ketone - Urine: Negative    Specific Gravity: 1.026    Protein, Urine: 100 mg/dL    Urobilinogen: 1.0 mg/dL    Nitrite: Negative    Leukocyte Esterase Concentration: Small

## 2018-02-14 LAB
ANION GAP SERPL CALC-SCNC: 13 MMOL/L — SIGNIFICANT CHANGE UP (ref 5–17)
APPEARANCE UR: CLEAR — SIGNIFICANT CHANGE UP
APTT BLD: 35.4 SEC — SIGNIFICANT CHANGE UP (ref 27.5–37.4)
BILIRUB UR-MCNC: NEGATIVE — SIGNIFICANT CHANGE UP
BUN SERPL-MCNC: 20 MG/DL — SIGNIFICANT CHANGE UP (ref 7–23)
CALCIUM SERPL-MCNC: 8.8 MG/DL — SIGNIFICANT CHANGE UP (ref 8.4–10.5)
CHLORIDE SERPL-SCNC: 96 MMOL/L — SIGNIFICANT CHANGE UP (ref 96–108)
CO2 SERPL-SCNC: 24 MMOL/L — SIGNIFICANT CHANGE UP (ref 22–31)
COLOR SPEC: YELLOW — SIGNIFICANT CHANGE UP
CREAT SERPL-MCNC: 0.95 MG/DL — SIGNIFICANT CHANGE UP (ref 0.5–1.3)
DIFF PNL FLD: NEGATIVE — SIGNIFICANT CHANGE UP
GAS PNL BLDA: SIGNIFICANT CHANGE UP
GLUCOSE BLDC GLUCOMTR-MCNC: 177 MG/DL — HIGH (ref 70–99)
GLUCOSE BLDC GLUCOMTR-MCNC: 224 MG/DL — HIGH (ref 70–99)
GLUCOSE BLDC GLUCOMTR-MCNC: 237 MG/DL — HIGH (ref 70–99)
GLUCOSE BLDC GLUCOMTR-MCNC: 249 MG/DL — HIGH (ref 70–99)
GLUCOSE SERPL-MCNC: 215 MG/DL — HIGH (ref 70–99)
GLUCOSE UR QL: 250 MG/DL
HCT VFR BLD CALC: 34 % — LOW (ref 34.5–45)
HGB BLD-MCNC: 10.7 G/DL — LOW (ref 11.5–15.5)
INR BLD: 1.79 RATIO — HIGH (ref 0.88–1.16)
KETONES UR-MCNC: NEGATIVE — SIGNIFICANT CHANGE UP
LEUKOCYTE ESTERASE UR-ACNC: ABNORMAL
MCHC RBC-ENTMCNC: 29.1 PG — SIGNIFICANT CHANGE UP (ref 27–34)
MCHC RBC-ENTMCNC: 31.4 GM/DL — LOW (ref 32–36)
MCV RBC AUTO: 92.8 FL — SIGNIFICANT CHANGE UP (ref 80–100)
NITRITE UR-MCNC: NEGATIVE — SIGNIFICANT CHANGE UP
PH UR: 6.5 — SIGNIFICANT CHANGE UP (ref 5–8)
PLATELET # BLD AUTO: 278 K/UL — SIGNIFICANT CHANGE UP (ref 150–400)
POTASSIUM SERPL-MCNC: 3.8 MMOL/L — SIGNIFICANT CHANGE UP (ref 3.5–5.3)
POTASSIUM SERPL-SCNC: 3.8 MMOL/L — SIGNIFICANT CHANGE UP (ref 3.5–5.3)
PROT UR-MCNC: 30 MG/DL
PROTHROM AB SERPL-ACNC: 19.8 SEC — HIGH (ref 9.8–12.7)
RBC # BLD: 3.67 M/UL — LOW (ref 3.8–5.2)
RBC # FLD: 13.3 % — SIGNIFICANT CHANGE UP (ref 10.3–14.5)
RHEUMATOID FACT SERPL-ACNC: 17 IU/ML — HIGH (ref 0–13.9)
SODIUM SERPL-SCNC: 133 MMOL/L — LOW (ref 135–145)
SP GR SPEC: 1.01 — SIGNIFICANT CHANGE UP (ref 1.01–1.02)
UROBILINOGEN FLD QL: NEGATIVE — SIGNIFICANT CHANGE UP
WBC # BLD: 10.4 K/UL — SIGNIFICANT CHANGE UP (ref 3.8–10.5)
WBC # FLD AUTO: 10.4 K/UL — SIGNIFICANT CHANGE UP (ref 3.8–10.5)

## 2018-02-14 PROCEDURE — 72148 MRI LUMBAR SPINE W/O DYE: CPT | Mod: 26

## 2018-02-14 PROCEDURE — 72141 MRI NECK SPINE W/O DYE: CPT | Mod: 26

## 2018-02-14 RX ORDER — ENOXAPARIN SODIUM 100 MG/ML
80 INJECTION SUBCUTANEOUS EVERY 12 HOURS
Qty: 0 | Refills: 0 | Status: DISCONTINUED | OUTPATIENT
Start: 2018-02-14 | End: 2018-02-15

## 2018-02-14 RX ADMIN — Medication 400 MILLIGRAM(S): at 00:52

## 2018-02-14 RX ADMIN — LIDOCAINE 1 PATCH: 4 CREAM TOPICAL at 12:24

## 2018-02-14 RX ADMIN — Medication 2: at 16:51

## 2018-02-14 RX ADMIN — Medication 1: at 11:58

## 2018-02-14 RX ADMIN — Medication 3 MILLILITER(S): at 17:20

## 2018-02-14 RX ADMIN — Medication 200 MILLIGRAM(S): at 05:44

## 2018-02-14 RX ADMIN — LIDOCAINE 1 PATCH: 4 CREAM TOPICAL at 02:34

## 2018-02-14 RX ADMIN — Medication 3 MILLILITER(S): at 05:44

## 2018-02-14 RX ADMIN — Medication 1000 UNIT(S): at 12:24

## 2018-02-14 RX ADMIN — Medication 100 MILLIGRAM(S): at 10:57

## 2018-02-14 RX ADMIN — Medication 100 MILLIGRAM(S): at 17:18

## 2018-02-14 RX ADMIN — ENOXAPARIN SODIUM 80 MILLIGRAM(S): 100 INJECTION SUBCUTANEOUS at 17:19

## 2018-02-14 RX ADMIN — Medication 2: at 08:18

## 2018-02-14 RX ADMIN — Medication 100 MILLIGRAM(S): at 04:51

## 2018-02-14 RX ADMIN — Medication 3 MILLILITER(S): at 12:24

## 2018-02-14 RX ADMIN — Medication 200 MILLIGRAM(S): at 17:19

## 2018-02-14 RX ADMIN — UMECLIDINIUM BROMIDE AND VILANTEROL TRIFENATATE 1 PUFF(S): 62.5; 25 POWDER RESPIRATORY (INHALATION) at 15:24

## 2018-02-14 NOTE — PROGRESS NOTE ADULT - SUBJECTIVE AND OBJECTIVE BOX
Daughter reported delirium last night, max temp was 99.3    Vital Signs Last 24 Hrs  T(C): 36.3 (2018 11:46), Max: 37.3 (2018 22:33)  T(F): 97.3 (2018 11:46), Max: 99.1 (2018 22:33)  HR: 71 (2018 11:46) (71 - 93)  BP: 141/69 (2018 11:46) (131/67 - 157/70)  BP(mean): --  RR: 18 (2018 11:46) (18 - 19)  SpO2: 97% (2018 11:46) (96% - 98%)    GENERAL: NAD, MS seems back to baseline during the day  HEAD:  Atraumatic, Normocephalic  EYES: EOMI  NECK: Supple, No JVD, No LAD  CHEST/LUNG: Clear to auscultation bilaterally  HEART: Regular rate and rhythm; nl S1/S2; No murmurs, rubs, or gallops  ABDOMEN: Soft, Nontender, Nondistended; Bowel sounds present  EXTREMITIES:  2+ Peripheral Pulses, 2+ b/l LE edema    LABS:                        10.7   10.4  )-----------( 278      ( 2018 06:20 )             34.0     02-14    133<L>  |  96  |  20  ----------------------------<  215<H>  3.8   |  24  |  0.95    Ca    8.8      2018 06:20  Mg     2.2     02-13      PT/INR - ( 2018 10:40 )   PT: 17.9 sec;   INR: 1.64 ratio         PTT - ( 2018 15:20 )  PTT:31.5 sec  CAPILLARY BLOOD GLUCOSE      POCT Blood Glucose.: 177 mg/dL (2018 11:50)  POCT Blood Glucose.: 237 mg/dL (2018 08:06)  POCT Blood Glucose.: 253 mg/dL (2018 21:00)  POCT Blood Glucose.: 286 mg/dL (2018 17:05)        Urinalysis Basic - ( 2018 00:07 )    Color: Yellow / Appearance: Clear / S.013 / pH: x  Gluc: x / Ketone: Negative  / Bili: Negative / Urobili: Negative   Blood: x / Protein: 30 mg/dL / Nitrite: Negative   Leuk Esterase: Trace / RBC: 0-2 /HPF / WBC 5-10 /HPF   Sq Epi: x / Non Sq Epi: Occasional /HPF / Bacteria: x

## 2018-02-14 NOTE — PROGRESS NOTE ADULT - PROBLEM SELECTOR PLAN 6
has mcadams back in  will need outpt f/u with urology has mcadams back in  stopped duonebs because of anticholinergic component, will strongly consider switching her anoro to symbicort after speaking to her outside pulmonologist

## 2018-02-14 NOTE — PROGRESS NOTE ADULT - PROBLEM SELECTOR PLAN 1
[address] [facility]    [address] [place] [address]    Phone:  [phone]                                       After Visit Summary   12/12/2017    Bud Merritt    MRN: [id number]           After Visit Summary Signature Page     I have received my discharge instructions, and my questions have been answered. I have discussed any challenges I see with this plan with the nurse or doctor.    ..........................................................................................................................................  Patient/Patient Representative Signature      ..........................................................................................................................................  Patient Representative Print Name and Relationship to Patient    ..................................................               ................................................  Date                                            Time    ..........................................................................................................................................  Reviewed by Signature/Title    ...................................................              ..............................................  Date                                                            Time           so far no focal infectious etiology isolated as a reason for the fever  blood cx neg, rvp neg urine cx neg   echo no vegetations noted  urinary retention now with mcadams  CT with contrast does not show pathology that would be cause of fever  DVT can give fever, but seems that would be resolving by now.  has small b/l pleural effusions doubt significant  encourage oob  follow temp curve  would encourage oob  sed rate mildly elevated  unless stronger support for infection emerges would hope to limited antibiotic use, with change to PO antibiotics, fro perhaps 5-7 days in total

## 2018-02-14 NOTE — PROGRESS NOTE ADULT - PROBLEM SELECTOR PLAN 1
WBC and temp curves have improved  will continue cipro/flagyl x total 7 days to cover proctitis  CURTIS testing  if delirium recurs will consider LP WBC and temp curves have improved  will continue cipro/flagyl x total 7 days to cover proctitis  CURTIS testing  RF elevated, ordered repeat level plus anti-CCP, ESR, CRP, SPEP/UPEP/immunofixation  doubt pt has viral meningitis, much less bacterial meningitis

## 2018-02-15 LAB
ANION GAP SERPL CALC-SCNC: 14 MMOL/L — SIGNIFICANT CHANGE UP (ref 5–17)
APTT BLD: 35.5 SEC — SIGNIFICANT CHANGE UP (ref 27.5–37.4)
BUN SERPL-MCNC: 18 MG/DL — SIGNIFICANT CHANGE UP (ref 7–23)
CALCIUM SERPL-MCNC: 8.8 MG/DL — SIGNIFICANT CHANGE UP (ref 8.4–10.5)
CHLORIDE SERPL-SCNC: 98 MMOL/L — SIGNIFICANT CHANGE UP (ref 96–108)
CK SERPL-CCNC: 47 U/L — SIGNIFICANT CHANGE UP (ref 25–170)
CO2 SERPL-SCNC: 21 MMOL/L — LOW (ref 22–31)
CREAT SERPL-MCNC: 0.79 MG/DL — SIGNIFICANT CHANGE UP (ref 0.5–1.3)
CRP SERPL-MCNC: 24.7 MG/DL — HIGH (ref 0–0.4)
GLUCOSE BLDC GLUCOMTR-MCNC: 165 MG/DL — HIGH (ref 70–99)
GLUCOSE BLDC GLUCOMTR-MCNC: 214 MG/DL — HIGH (ref 70–99)
GLUCOSE BLDC GLUCOMTR-MCNC: 214 MG/DL — HIGH (ref 70–99)
GLUCOSE BLDC GLUCOMTR-MCNC: 233 MG/DL — HIGH (ref 70–99)
GLUCOSE SERPL-MCNC: 176 MG/DL — HIGH (ref 70–99)
HCT VFR BLD CALC: 34.9 % — SIGNIFICANT CHANGE UP (ref 34.5–45)
HGB BLD-MCNC: 11.3 G/DL — LOW (ref 11.5–15.5)
INR BLD: 1.97 RATIO — HIGH (ref 0.88–1.16)
MCHC RBC-ENTMCNC: 29.5 PG — SIGNIFICANT CHANGE UP (ref 27–34)
MCHC RBC-ENTMCNC: 32.4 GM/DL — SIGNIFICANT CHANGE UP (ref 32–36)
MCV RBC AUTO: 91.1 FL — SIGNIFICANT CHANGE UP (ref 80–100)
PLATELET # BLD AUTO: 285 K/UL — SIGNIFICANT CHANGE UP (ref 150–400)
POTASSIUM SERPL-MCNC: 4.8 MMOL/L — SIGNIFICANT CHANGE UP (ref 3.5–5.3)
POTASSIUM SERPL-SCNC: 4.8 MMOL/L — SIGNIFICANT CHANGE UP (ref 3.5–5.3)
PROT SERPL-MCNC: 6.2 G/DL — SIGNIFICANT CHANGE UP (ref 6–8.3)
PROT SERPL-MCNC: 6.2 G/DL — SIGNIFICANT CHANGE UP (ref 6–8.3)
PROTHROM AB SERPL-ACNC: 22.6 SEC — HIGH (ref 10–13.1)
RBC # BLD: 3.83 M/UL — SIGNIFICANT CHANGE UP (ref 3.8–5.2)
RBC # FLD: 14.9 % — HIGH (ref 10.3–14.5)
RHEUMATOID FACT SERPL-ACNC: 15 IU/ML — HIGH (ref 0–13.9)
SODIUM SERPL-SCNC: 133 MMOL/L — LOW (ref 135–145)
WBC # BLD: 13.56 K/UL — HIGH (ref 3.8–10.5)
WBC # FLD AUTO: 13.56 K/UL — HIGH (ref 3.8–10.5)

## 2018-02-15 PROCEDURE — 99223 1ST HOSP IP/OBS HIGH 75: CPT | Mod: GC

## 2018-02-15 PROCEDURE — 72157 MRI CHEST SPINE W/O & W/DYE: CPT | Mod: 26

## 2018-02-15 RX ORDER — WARFARIN SODIUM 2.5 MG/1
5 TABLET ORAL ONCE
Qty: 0 | Refills: 0 | Status: COMPLETED | OUTPATIENT
Start: 2018-02-15 | End: 2018-02-15

## 2018-02-15 RX ADMIN — LIDOCAINE 1 PATCH: 4 CREAM TOPICAL at 12:50

## 2018-02-15 RX ADMIN — Medication 400 MILLIGRAM(S): at 22:48

## 2018-02-15 RX ADMIN — LIDOCAINE 1 PATCH: 4 CREAM TOPICAL at 01:53

## 2018-02-15 RX ADMIN — Medication 100 MILLIGRAM(S): at 22:37

## 2018-02-15 RX ADMIN — WARFARIN SODIUM 5 MILLIGRAM(S): 2.5 TABLET ORAL at 22:38

## 2018-02-15 RX ADMIN — Medication 2: at 12:10

## 2018-02-15 RX ADMIN — Medication 100 MILLIGRAM(S): at 15:36

## 2018-02-15 RX ADMIN — Medication 1: at 08:33

## 2018-02-15 RX ADMIN — UMECLIDINIUM BROMIDE AND VILANTEROL TRIFENATATE 1 PUFF(S): 62.5; 25 POWDER RESPIRATORY (INHALATION) at 09:50

## 2018-02-15 RX ADMIN — Medication 200 MILLIGRAM(S): at 22:37

## 2018-02-15 RX ADMIN — Medication 100 MILLIGRAM(S): at 06:47

## 2018-02-15 RX ADMIN — Medication 2: at 16:51

## 2018-02-15 RX ADMIN — Medication 200 MILLIGRAM(S): at 08:41

## 2018-02-15 RX ADMIN — Medication 5 MILLIGRAM(S): at 18:54

## 2018-02-15 RX ADMIN — Medication 1000 UNIT(S): at 12:11

## 2018-02-15 NOTE — PROGRESS NOTE ADULT - SUBJECTIVE AND OBJECTIVE BOX
[facility], Division of Infectious Diseases  MAYA Pyle A. Lee  [phone]  Name: MARU VILLASEÑOR  Age: 82y  Gender: Female  MRN: [id number]    Interval History--  Notes reviewed  pain in neck persists    Past Medical History--  Breast cancer  Lung cancer, left  Diabetes mellitus  Afib  S/P colectomy  Status post left hip replacement  S/P lobectomy of lung      For details regarding the patient's social history, family history, and other miscellaneous elements, please refer the initial infectious diseases consultation and/or the admitting history and physical examination for this admission.    Allergies    codeine (Fever; Rash)  contrast media (iodine-based) (Other)    Intolerances        Medications--  Antibiotics:  ciprofloxacin   IVPB 400 milliGRAM(s) IV Intermittent every 12 hours  metroNIDAZOLE  IVPB 500 milliGRAM(s) IV Intermittent every 8 hours    Immunologic:    Other:  acetaminophen   Tablet PRN  cholecalciferol  dextrose 5%.  ibuprofen  Tablet PRN  insulin lispro (HumaLOG) corrective regimen sliding scale  insulin lispro (HumaLOG) corrective regimen sliding scale  lidocaine   Patch  sodium chloride 0.9%.  umeclidinium 62.5 MICROgram(s)/vilanterol 25 MICROgram(s) Inhaler  warfarin      Review of Systems--  A 10-point review of systems was obtained.     Pertinent positives and negatives--  Constitutional: No fevers. No Chills. No Rigors.   Cardiovascular: No chest pain. No palpitations.  Respiratory: No shortness of breath. No cough.  Gastrointestinal: No nausea or vomiting. No diarrhea or constipation.   Psychiatric: + depression    Review of systems otherwise negative except as previously noted.    Physical Examination--  Vital Signs: T(F): 97.9 (02-15-18 @ 04:11), Max: 99.9 (02-14-18 @ 21:14)  HR: 75 (02-15-18 @ 04:11)  BP: 124/78 (02-15-18 @ 04:11)  RR: 18 (02-15-18 @ 04:11)  SpO2: 98% (02-15-18 @ 04:11)  Wt(kg): --  General: Nontoxic-appearing Female in no acute distress.  HEENT: AT/NC.  Anicteric. Conjunctiva pink and moist. Oropharynx clear. Dentition fair.  Neck: Not rigid. No sense of mass.  Nodes: None palpable.  Lungs: Clear bilaterally without rales, wheezing or rhonchi  Heart: Regular rate and rhythm. No Murmur. No rub. No gallop. No palpable thrill.  Abdomen: Bowel sounds present and normoactive. Soft. Nondistended.   Extremities: No cyanosis or clubbing. ++edema.   Skin: Warm. Dry. Good turgor. No rash. No vasculitic stigmata.  Psychiatric: Appropriate affect and mood for situation.         Laboratory Studies--  CBC                        11.3   13.56 )-----------( 285      ( 15 Feb 2018 07:41 )             34.9       Chemistries  02-15    133<L>  |  98  |  18  ----------------------------<  176<H>  4.8   |  21<L>  |  0.79    Ca    8.8      15 Feb 2018 07:36        Culture Data    Culture - Urine (collected 11 Feb 2018 18:27)  Source: .Urine Catheterized  Final Report (13 Feb 2018 20:29):    >100,000 CFU/ml Enterococcus faecalis    10,000 - 49,000 CFU/mL Pseudomonas aeruginosa  Organism: Enterococcus faecalis  Pseudomonas aeruginosa (13 Feb 2018 20:29)  Organism: Pseudomonas aeruginosa (13 Feb 2018 20:29)  Organism: Enterococcus faecalis (13 Feb 2018 20:29)    Culture - Blood (collected 11 Feb 2018 17:21)  Source: .Blood Blood-Peripheral  Preliminary Report (12 Feb 2018 18:01):    No growth to date.    Culture - Blood (collected 11 Feb 2018 17:21)  Source: .Blood Blood-Peripheral  Preliminary Report (12 Feb 2018 18:01):    No growth to date.

## 2018-02-15 NOTE — PROGRESS NOTE ADULT - PROBLEM SELECTOR PLAN 1
so far no focal infectious etiology isolated as a reason for the fever  blood cx neg, rvp neg urine cx neg   echo no vegetations noted  urinary retention now with mcadams  CT with contrast does not show pathology that would be cause of fever  DVT can give fever, but seems that would be resolving by now.  has small b/l pleural effusions doubt significant  encourage oob  follow temp curve  would encourage oob  sed rate mildly elevated  unless stronger support for infection emerges would hope to limited antibiotic use, with change to PO antibiotics, fro perhaps 5-7 days in total

## 2018-02-15 NOTE — CONSULT NOTE ADULT - SUBJECTIVE AND OBJECTIVE BOX
MARU VILLASEÑOR  [id number]    HISTORY OF PRESENT ILLNESS:  Pt is an 82yoF hx of lung ca s/p lobectomy, COPD, melanoma, breast ca, R sided DVT, recently diagnosed L leg DVT on Coumadin now p/w fever and syncope at home.   Pt has been admitted since 2/4- has undergone extensive infectious workup with no clear cause of fevers found. CTAP was done which showed suspicion for proctitis and pt has been on Abx.   For about 1 week, pt has been c/o neck pain and diffuse body pain. MRI C spine showed degenerative disease.   Pt does not provide much history - obtained primarily from daughter Barrington over phone ([phone]). Kahlil bryant is at bedside.  Pt c/o neck pain and pain in her arms and legs. Per daughter, pt had no issue moving her neck from side to side about 1 week ago, but recently she has been reluctant to move her neck to the L side. Family believes this could be related to the fact that she has been in and out of the hospital for about 1 month now. Pt says she is "sick of being in the hospital for no reason"  Denies fevers/chills/Cp/SOB/abdominal discomfort.   Denies any personal or family hx of autoimmune disease       PAST MEDICAL & SURGICAL HISTORY:  Breast cancer  Lung cancer, left  Diabetes mellitus: diet controlled diabetes  Afib  S/P colectomy  Status post left hip replacement  S/P lobectomy of lung: s/p LLL Lobectomy 6/2014 for lung CA      Review of Systems:  Gen:  No fevers/chills, weight loss  HEENT: No blurry vision, no difficulty swallowing  CVS: No chest pain/palpitations  Resp: No SOB/wheezing  GI: No N/V/C/D/abdominal pain  MSK: +neck pain +body pains  Skin: No new rashes  Neuro: No headaches    MEDICATIONS  (STANDING):  cholecalciferol 1000 Unit(s) Oral daily  ciprofloxacin   IVPB 400 milliGRAM(s) IV Intermittent every 12 hours  dextrose 5%. 1000 milliLiter(s) (50 mL/Hr) IV Continuous <Continuous>  insulin lispro (HumaLOG) corrective regimen sliding scale   SubCutaneous at bedtime  insulin lispro (HumaLOG) corrective regimen sliding scale   SubCutaneous three times a day before meals  lidocaine   Patch 1 Patch Transdermal daily  metroNIDAZOLE  IVPB 500 milliGRAM(s) IV Intermittent every 8 hours  sodium chloride 0.9%. 1000 milliLiter(s) (50 mL/Hr) IV Continuous <Continuous>  umeclidinium 62.5 MICROgram(s)/vilanterol 25 MICROgram(s) Inhaler 1 Puff(s) Inhalation daily  warfarin 5 milliGRAM(s) Oral once    MEDICATIONS  (PRN):  acetaminophen   Tablet 650 milliGRAM(s) Oral every 6 hours PRN For Temp greater than 38 C (100.4 F)  ibuprofen  Tablet 400 milliGRAM(s) Oral every 8 hours PRN pain      Allergies    codeine (Fever; Rash)  contrast media (iodine-based) (Other)    Intolerances        PERTINENT MEDICATION HISTORY:    SOC Hx: no toxic habits    FAMILY HISTORY:  Family history of lung cancer (Sibling)  Family history of secondary cancer of bone and bone marrow  Family history of dementia      Vital Signs Last 24 Hrs  T(C): 36.8 (15 Feb 2018 12:28), Max: 37.7 (14 Feb 2018 21:14)  T(F): 98.3 (15 Feb 2018 12:28), Max: 99.9 (14 Feb 2018 21:14)  HR: 76 (15 Feb 2018 12:28) (75 - 84)  BP: 124/70 (15 Feb 2018 12:28) (113/67 - 137/71)  BP(mean): --  RR: 18 (15 Feb 2018 12:28) (18 - 18)  SpO2: 96% (15 Feb 2018 12:28) (96% - 98%)    Physical Exam:  General: No apparent distress  HEENT: EOMI, MMM  CVS: +S1/S2, RRR, no murmurs/rubs/gallops  Resp: CTA b/l. No crackles/wheezing  GI: Soft, NT/ND +BS  MSK: Pt has pain on palpation of her neck, reports difficulty moving to the L side however when moved passively, pt has no pain. No tenderness to palpation of shoulders or elbows b/l. B/l wrists with limited flexion/extension with pain on palpation of joint. No pain on palpation of MCPs PIPs DIPs b/l. Knee exam wnl b/l  Neuro: AAOx3  Skin: no visible rashes    LABS:                        11.3   13.56 )-----------( 285      ( 15 Feb 2018 07:41 )             34.9     02-15    133<L>  |  98  |  18  ----------------------------<  176<H>  4.8   |  21<L>  |  0.79    Ca    8.8      15 Feb 2018 07:36    Sedimentation Rate, Erythrocyte (02.08.18 @ 07:34)    Sedimentation Rate, Erythrocyte: 43 mm/hr    C-Reactive Protein, Serum (02.15.18 @ 07:36)    C-Reactive Protein, Serum: 24.70 mg/dL    Rheumatoid Factor Quant, Serum or Plasma in AM (02.15.18 @ 07:36)    Rheumatoid Factor Quant, Serum or Plasma: 15.0 IU/mL        RADIOLOGY & ADDITIONAL STUDIES:

## 2018-02-15 NOTE — PROGRESS NOTE ADULT - ATTENDING COMMENTS
will sign off   call with questions
Rodrigo Hurley MD  [phone]
will sign off   please call with questions

## 2018-02-15 NOTE — CONSULT NOTE ADULT - ASSESSMENT
82yoF hx of lung ca s/p lobectomy, COPD, melanoma, breast ca, R sided DVT, recently diagnosed L leg DVT on Coumadin now p/w fever and syncope at home now reporting diffuse body pains     *recs pending d/w attending 82yoF hx of lung ca s/p lobectomy, COPD, melanoma, breast ca, R sided DVT, recently diagnosed L leg DVT on Coumadin now p/w fever and syncope at home now reporting diffuse body pains     In setting of relatively acute onset of neck pain and stiffness with diffuse pains, can consider polymyalgia rheumatica   - Would rec trial of Prednisone 5mg BID for now to assess for improvement  - Check CCP  - PT/OT and oob    Will follow

## 2018-02-15 NOTE — PROGRESS NOTE ADULT - PROBLEM SELECTOR PLAN 1
will continue cipro/flagyl x total 7 days to cover proctitis  CURTIS testing  RF elevated, ordered repeat level plus anti-CCP, ESR, CRP, Sjogren's, scleroderma, lupus, SPEP/UPEP/immunofixation  rheum consult  doubt pt has viral meningitis, much less bacterial meningitis

## 2018-02-15 NOTE — PROGRESS NOTE ADULT - SUBJECTIVE AND OBJECTIVE BOX
Pain in neck, arms, legs, lower back    Vital Signs Last 24 Hrs  T(C): 36.6 (15 Feb 2018 04:11), Max: 37.7 (2018 21:14)  T(F): 97.9 (15 Feb 2018 04:11), Max: 99.9 (2018 21:14)  HR: 75 (15 Feb 2018 04:11) (71 - 84)  BP: 124/78 (15 Feb 2018 04:11) (113/67 - 141/69)  BP(mean): --  RR: 18 (15 Feb 2018 04:11) (18 - 19)  SpO2: 98% (15 Feb 2018 04:11) (97% - 98%)    GENERAL: NAD, at baseline MS  HEAD:  Atraumatic, Normocephalic  EYES: EOMI  NECK: Supple, No JVD, No LAD  CHEST/LUNG: Clear to auscultation bilaterally  HEART: Regular rate and rhythm; nl S1/S2; No murmurs, rubs, or gallops  ABDOMEN: Soft, Nontender, Nondistended; Bowel sounds present  EXTREMITIES:  2+ b/l LE edema; (+)mcadams draining clear yellow urine    LABS:                        11.3   13.56 )-----------( 285      ( 15 Feb 2018 07:41 )             34.9     02-15    133<L>  |  98  |  18  ----------------------------<  176<H>  4.8   |  21<L>  |  0.79    Ca    8.8      15 Feb 2018 07:36      PT/INR - ( 15 Feb 2018 07:41 )   PT: 22.6 sec;   INR: 1.97 ratio         PTT - ( 15 Feb 2018 07:41 )  PTT:35.5 sec  CAPILLARY BLOOD GLUCOSE      POCT Blood Glucose.: 165 mg/dL (15 Feb 2018 07:59)  POCT Blood Glucose.: 249 mg/dL (2018 21:13)  POCT Blood Glucose.: 224 mg/dL (2018 16:41)  POCT Blood Glucose.: 177 mg/dL (2018 11:50)    CARDIAC MARKERS ( 15 Feb 2018 07:36 )  x     / x     / 47 U/L / x     / x          Urinalysis Basic - ( 2018 00:07 )    Color: Yellow / Appearance: Clear / S.013 / pH: x  Gluc: x / Ketone: Negative  / Bili: Negative / Urobili: Negative   Blood: x / Protein: 30 mg/dL / Nitrite: Negative   Leuk Esterase: Trace / RBC: 0-2 /HPF / WBC 5-10 /HPF   Sq Epi: x / Non Sq Epi: Occasional /HPF / Bacteria: x

## 2018-02-15 NOTE — PROGRESS NOTE ADULT - PROBLEM SELECTOR PLAN 6
TOV today  stopped duonebs because of anticholinergic component, will strongly consider switching her anoro to symbicort after speaking to her outside pulmonologist, but she is adamant about staying on anoro

## 2018-02-16 LAB
% ALBUMIN: 37.9 % — SIGNIFICANT CHANGE UP
% ALPHA 1: 10.7 % — SIGNIFICANT CHANGE UP
% ALPHA 2: 19.5 % — SIGNIFICANT CHANGE UP
% BETA: 12.6 % — SIGNIFICANT CHANGE UP
% GAMMA: 19.3 % — SIGNIFICANT CHANGE UP
ALBUMIN SERPL ELPH-MCNC: 2.3 G/DL — LOW (ref 3.6–5.5)
ALBUMIN/GLOB SERPL ELPH: 0.6 RATIO — SIGNIFICANT CHANGE UP
ALPHA1 GLOB SERPL ELPH-MCNC: 0.7 G/DL — HIGH (ref 0.1–0.4)
ALPHA2 GLOB SERPL ELPH-MCNC: 1.2 G/DL — HIGH (ref 0.5–1)
ANA TITR SER: NEGATIVE — SIGNIFICANT CHANGE UP
ANION GAP SERPL CALC-SCNC: 12 MMOL/L — SIGNIFICANT CHANGE UP (ref 5–17)
B-GLOBULIN SERPL ELPH-MCNC: 0.8 G/DL — SIGNIFICANT CHANGE UP (ref 0.5–1)
BASOPHILS # BLD AUTO: 0.01 K/UL — SIGNIFICANT CHANGE UP (ref 0–0.2)
BASOPHILS NFR BLD AUTO: 0.1 % — SIGNIFICANT CHANGE UP (ref 0–2)
BUN SERPL-MCNC: 22 MG/DL — SIGNIFICANT CHANGE UP (ref 7–23)
CALCIUM SERPL-MCNC: 9.1 MG/DL — SIGNIFICANT CHANGE UP (ref 8.4–10.5)
CCP IGG SERPL-ACNC: <8 UNITS — SIGNIFICANT CHANGE UP (ref 0–19)
CHLORIDE SERPL-SCNC: 100 MMOL/L — SIGNIFICANT CHANGE UP (ref 96–108)
CO2 SERPL-SCNC: 24 MMOL/L — SIGNIFICANT CHANGE UP (ref 22–31)
CREAT SERPL-MCNC: 0.85 MG/DL — SIGNIFICANT CHANGE UP (ref 0.5–1.3)
CULTURE RESULTS: SIGNIFICANT CHANGE UP
CULTURE RESULTS: SIGNIFICANT CHANGE UP
DSDNA AB FLD-ACNC: <0.2 AI — SIGNIFICANT CHANGE UP
ENA SCL70 AB SER-ACNC: 0.6 AI — SIGNIFICANT CHANGE UP
ENA SS-A AB FLD IA-ACNC: <0.2 AI — SIGNIFICANT CHANGE UP
EOSINOPHIL # BLD AUTO: 0 K/UL — SIGNIFICANT CHANGE UP (ref 0–0.5)
EOSINOPHIL NFR BLD AUTO: 0 % — SIGNIFICANT CHANGE UP (ref 0–6)
GAMMA GLOBULIN: 1.2 G/DL — SIGNIFICANT CHANGE UP (ref 0.6–1.6)
GLUCOSE BLDC GLUCOMTR-MCNC: 178 MG/DL — HIGH (ref 70–99)
GLUCOSE BLDC GLUCOMTR-MCNC: 200 MG/DL — HIGH (ref 70–99)
GLUCOSE BLDC GLUCOMTR-MCNC: 208 MG/DL — HIGH (ref 70–99)
GLUCOSE BLDC GLUCOMTR-MCNC: 221 MG/DL — HIGH (ref 70–99)
GLUCOSE SERPL-MCNC: 240 MG/DL — HIGH (ref 70–99)
HCT VFR BLD CALC: 34.5 % — SIGNIFICANT CHANGE UP (ref 34.5–45)
HGB BLD-MCNC: 11.2 G/DL — LOW (ref 11.5–15.5)
IMM GRANULOCYTES NFR BLD AUTO: 0.1 % — SIGNIFICANT CHANGE UP (ref 0–1.5)
INR BLD: 1.74 RATIO — HIGH (ref 0.88–1.16)
INTERPRETATION SERPL IFE-IMP: SIGNIFICANT CHANGE UP
LYMPHOCYTES # BLD AUTO: 1.09 K/UL — SIGNIFICANT CHANGE UP (ref 1–3.3)
LYMPHOCYTES # BLD AUTO: 12 % — LOW (ref 13–44)
MCHC RBC-ENTMCNC: 29.6 PG — SIGNIFICANT CHANGE UP (ref 27–34)
MCHC RBC-ENTMCNC: 32.5 GM/DL — SIGNIFICANT CHANGE UP (ref 32–36)
MCV RBC AUTO: 91 FL — SIGNIFICANT CHANGE UP (ref 80–100)
MONOCYTES # BLD AUTO: 0.58 K/UL — SIGNIFICANT CHANGE UP (ref 0–0.9)
MONOCYTES NFR BLD AUTO: 6.4 % — SIGNIFICANT CHANGE UP (ref 2–14)
NEUTROPHILS # BLD AUTO: 7.42 K/UL — HIGH (ref 1.8–7.4)
NEUTROPHILS NFR BLD AUTO: 81.4 % — HIGH (ref 43–77)
PLATELET # BLD AUTO: 322 K/UL — SIGNIFICANT CHANGE UP (ref 150–400)
POTASSIUM SERPL-MCNC: 4.6 MMOL/L — SIGNIFICANT CHANGE UP (ref 3.5–5.3)
POTASSIUM SERPL-SCNC: 4.6 MMOL/L — SIGNIFICANT CHANGE UP (ref 3.5–5.3)
PROT PATTERN SERPL ELPH-IMP: SIGNIFICANT CHANGE UP
PROTHROM AB SERPL-ACNC: 19.9 SEC — HIGH (ref 10–13.1)
RBC # BLD: 3.79 M/UL — LOW (ref 3.8–5.2)
RBC # FLD: 15 % — HIGH (ref 10.3–14.5)
RF+CCP IGG SER-IMP: NEGATIVE — SIGNIFICANT CHANGE UP
SODIUM SERPL-SCNC: 136 MMOL/L — SIGNIFICANT CHANGE UP (ref 135–145)
SPECIMEN SOURCE: SIGNIFICANT CHANGE UP
SPECIMEN SOURCE: SIGNIFICANT CHANGE UP
WBC # BLD: 9.11 K/UL — SIGNIFICANT CHANGE UP (ref 3.8–10.5)
WBC # FLD AUTO: 9.11 K/UL — SIGNIFICANT CHANGE UP (ref 3.8–10.5)

## 2018-02-16 PROCEDURE — 99232 SBSQ HOSP IP/OBS MODERATE 35: CPT | Mod: GC

## 2018-02-16 RX ORDER — INSULIN LISPRO 100/ML
VIAL (ML) SUBCUTANEOUS
Qty: 0 | Refills: 0 | Status: DISCONTINUED | OUTPATIENT
Start: 2018-02-16 | End: 2018-02-20

## 2018-02-16 RX ORDER — INSULIN LISPRO 100/ML
VIAL (ML) SUBCUTANEOUS AT BEDTIME
Qty: 0 | Refills: 0 | Status: DISCONTINUED | OUTPATIENT
Start: 2018-02-16 | End: 2018-02-20

## 2018-02-16 RX ORDER — WARFARIN SODIUM 2.5 MG/1
5 TABLET ORAL ONCE
Qty: 0 | Refills: 0 | Status: COMPLETED | OUTPATIENT
Start: 2018-02-16 | End: 2018-02-16

## 2018-02-16 RX ADMIN — Medication 400 MILLIGRAM(S): at 00:00

## 2018-02-16 RX ADMIN — LIDOCAINE 1 PATCH: 4 CREAM TOPICAL at 01:00

## 2018-02-16 RX ADMIN — Medication 200 MILLIGRAM(S): at 21:34

## 2018-02-16 RX ADMIN — Medication 4: at 16:59

## 2018-02-16 RX ADMIN — Medication 5 MILLIGRAM(S): at 18:09

## 2018-02-16 RX ADMIN — Medication 100 MILLIGRAM(S): at 21:34

## 2018-02-16 RX ADMIN — LIDOCAINE 1 PATCH: 4 CREAM TOPICAL at 12:38

## 2018-02-16 RX ADMIN — Medication 1: at 12:38

## 2018-02-16 RX ADMIN — Medication 5 MILLIGRAM(S): at 06:05

## 2018-02-16 RX ADMIN — Medication 100 MILLIGRAM(S): at 06:05

## 2018-02-16 RX ADMIN — Medication 100 MILLIGRAM(S): at 15:29

## 2018-02-16 RX ADMIN — Medication 1000 UNIT(S): at 12:38

## 2018-02-16 RX ADMIN — WARFARIN SODIUM 5 MILLIGRAM(S): 2.5 TABLET ORAL at 21:35

## 2018-02-16 RX ADMIN — UMECLIDINIUM BROMIDE AND VILANTEROL TRIFENATATE 1 PUFF(S): 62.5; 25 POWDER RESPIRATORY (INHALATION) at 09:30

## 2018-02-16 RX ADMIN — Medication 2: at 08:47

## 2018-02-16 RX ADMIN — Medication 200 MILLIGRAM(S): at 08:47

## 2018-02-16 NOTE — CHART NOTE - NSCHARTNOTESELECT_GEN_ALL_CORE
Event Note
Fever/Event Note
Nutrition Services
Urinary Retention/Event Note

## 2018-02-16 NOTE — PROGRESS NOTE ADULT - SUBJECTIVE AND OBJECTIVE BOX
MARU VILLASEÑOR  [id number]    INTERVAL HPI/OVERNIGHT EVENTS:  No acute events overnight. Pt wants to go home.   Says she is not feeling much better and is not happy about being in the hospital.  Denies fevers/chills    MEDICATIONS  (STANDING):  cholecalciferol 1000 Unit(s) Oral daily  ciprofloxacin   IVPB 400 milliGRAM(s) IV Intermittent every 12 hours  dextrose 5%. 1000 milliLiter(s) (50 mL/Hr) IV Continuous <Continuous>  insulin lispro (HumaLOG) corrective regimen sliding scale   SubCutaneous three times a day before meals  insulin lispro (HumaLOG) corrective regimen sliding scale   SubCutaneous at bedtime  lidocaine   Patch 1 Patch Transdermal daily  metroNIDAZOLE  IVPB 500 milliGRAM(s) IV Intermittent every 8 hours  predniSONE   Tablet 5 milliGRAM(s) Oral two times a day  sodium chloride 0.9%. 1000 milliLiter(s) (50 mL/Hr) IV Continuous <Continuous>  umeclidinium 62.5 MICROgram(s)/vilanterol 25 MICROgram(s) Inhaler 1 Puff(s) Inhalation daily  warfarin 5 milliGRAM(s) Oral once    MEDICATIONS  (PRN):  acetaminophen   Tablet 650 milliGRAM(s) Oral every 6 hours PRN For Temp greater than 38 C (100.4 F)  ibuprofen  Tablet 400 milliGRAM(s) Oral every 8 hours PRN pain      Allergies    codeine (Fever; Rash)  contrast media (iodine-based) (Other)    Intolerances        Review of Systems:   General: No fevers/chills, no fatigue  CVS: No CP/palpitations  Resp: No SOB/wheezing  GI: No N/V/C/D/abdominal pain  MSK: +neck pain +leg pains  Skin: No new rashes  Neuro: No headaches      Vital Signs Last 24 Hrs  T(C): 36.7 (16 Feb 2018 12:07), Max: 37.7 (15 Feb 2018 18:50)  T(F): 98.1 (16 Feb 2018 12:07), Max: 99.8 (15 Feb 2018 18:50)  HR: 77 (16 Feb 2018 12:07) (75 - 83)  BP: 131/76 (16 Feb 2018 12:07) (123/68 - 131/76)  BP(mean): --  RR: 18 (16 Feb 2018 12:07) (17 - 18)  SpO2: 98% (16 Feb 2018 12:07) (96% - 98%)    Physical Exam:  General: NAD  MSK: +neck pain on palpation  Neuro: AAOx3    LABS:                        11.2   9.11  )-----------( 322      ( 16 Feb 2018 07:29 )             34.5     02-16    136  |  100  |  22  ----------------------------<  240<H>  4.6   |  24  |  0.85    Ca    9.1      16 Feb 2018 07:41    TPro  6.2  /  Alb  2.3<L>  /  TBili  x   /  DBili  x   /  AST  x   /  ALT  x   /  AlkPhos  x   02-15    PT/INR - ( 16 Feb 2018 07:29 )   PT: 19.9 sec;   INR: 1.74 ratio         PTT - ( 15 Feb 2018 07:41 )  PTT:35.5 sec        RADIOLOGY & ADDITIONAL TESTS:

## 2018-02-16 NOTE — PROVIDER CONTACT NOTE (OTHER) - BACKGROUND
82Y old female admitted with UTI
82Y old female admitted with UTI
Near Syncope in setting of Urosepsis - Azactam         Chronic and recurrent DVT,also patient spiking fever off antibiotics as per ID .patient WBC IS NORMAL
UTI
UTI
Admitted for UTI. PMHx of Lung Ca and Afib. Pt BP was 87/52 at 2200 and was lethargic. Pt had 103.2 rectal temp at 0410.
Admitted for UTI. PMHx of Lung Ca, AFib, DM. Pt was A&Ox3, forgetful at times since arrival to unit. Currently on 2l O2 NC.
Dx: UTI, syncope
Pt admitted for near syncope in the setting of urosepsis
admitted with near syncope ,chronic and recurrent DVT,presently admitted with dvt on coumadin

## 2018-02-16 NOTE — CONSULT NOTE ADULT - SUBJECTIVE AND OBJECTIVE BOX
Patient is a 82y old  Female who presents with a chief complaint of Patient noted by daughter to be on a plank position at home with patient attempting to break a fall, chills, fever. (04 Feb 2018 03:03)      HPI:  NIGHT HOSPITALIST:  Patient UNKNOWN to me previously, assigned to me at this point via the ER and by Dr. Morocho to admit this 83 y/o F--patient seen with adult daughter and son, son in law in attendance--patient with a history of lung CA with past LEFT lower lobectomy for lung CA presumed to be in remission, COPD, malignant melanoma, breast CA with past tylectomy presumed to be disease free, type 2 DM but not on current treatment, past RIGHT DVT on Coumadin with recent discharge from [place] in January 2018 for a LEFT leg DVT with patient resumed on Coumadin per patient's preference, with patient apparently initially declining rehab referral from last admission, with patient brought in by daughter following patient calling for help following daughter noted patient to be in a plank position at home after patient was attempting to break a fall.  Patient with fever, chills, rigors at home.  Patient with increased urination.  No HA, no focal weakness.  No LOC or head trauma.  Daughter was concerned if patient had an ictal episode with patient with (?) trismus.  No faecal or urinary incontinence.  No back pain, no tearing back pain.  No hip or extremity pain.  No abdominal pain, no red blood per rectum or melena.  Remaining review of systems not contributory. (04 Feb 2018 03:03)      PAST MEDICAL & SURGICAL HISTORY:  Breast cancer  Lung cancer, left  Diabetes mellitus: diet controlled diabetes  Afib  S/P colectomy  Status post left hip replacement  S/P lobectomy of lung: s/p LLL Lobectomy 6/2014 for lung CA      MEDICATIONS  (STANDING):  cholecalciferol 1000 Unit(s) Oral daily  ciprofloxacin   IVPB 400 milliGRAM(s) IV Intermittent every 12 hours  dextrose 5%. 1000 milliLiter(s) (50 mL/Hr) IV Continuous <Continuous>  insulin lispro (HumaLOG) corrective regimen sliding scale   SubCutaneous three times a day before meals  insulin lispro (HumaLOG) corrective regimen sliding scale   SubCutaneous at bedtime  lidocaine   Patch 1 Patch Transdermal daily  metroNIDAZOLE  IVPB 500 milliGRAM(s) IV Intermittent every 8 hours  predniSONE   Tablet 5 milliGRAM(s) Oral two times a day  sodium chloride 0.9%. 1000 milliLiter(s) (50 mL/Hr) IV Continuous <Continuous>  umeclidinium 62.5 MICROgram(s)/vilanterol 25 MICROgram(s) Inhaler 1 Puff(s) Inhalation daily  warfarin 5 milliGRAM(s) Oral once    MEDICATIONS  (PRN):  acetaminophen   Tablet 650 milliGRAM(s) Oral every 6 hours PRN For Temp greater than 38 C (100.4 F)  ibuprofen  Tablet 400 milliGRAM(s) Oral every 8 hours PRN pain      Allergies    codeine (Fever; Rash)  contrast media (iodine-based) (Other)    Intolerances        VITALS:    Vital Signs Last 24 Hrs  T(C): 36.7 (16 Feb 2018 12:07), Max: 37.7 (15 Feb 2018 18:50)  T(F): 98.1 (16 Feb 2018 12:07), Max: 99.8 (15 Feb 2018 18:50)  HR: 77 (16 Feb 2018 12:07) (75 - 83)  BP: 131/76 (16 Feb 2018 12:07) (123/68 - 131/76)  BP(mean): --  RR: 18 (16 Feb 2018 12:07) (17 - 18)  SpO2: 98% (16 Feb 2018 12:07) (96% - 98%)    LABS:                          11.2   9.11  )-----------( 322      ( 16 Feb 2018 07:29 )             34.5       02-16    136  |  100  |  22  ----------------------------<  240<H>  4.6   |  24  |  0.85    Ca    9.1      16 Feb 2018 07:41    TPro  6.2  /  Alb  2.3<L>  /  TBili  x   /  DBili  x   /  AST  x   /  ALT  x   /  AlkPhos  x   02-15      CAPILLARY BLOOD GLUCOSE      POCT Blood Glucose.: 200 mg/dL (16 Feb 2018 11:50)  POCT Blood Glucose.: 221 mg/dL (16 Feb 2018 08:37)  POCT Blood Glucose.: 214 mg/dL (15 Feb 2018 22:38)  POCT Blood Glucose.: 214 mg/dL (15 Feb 2018 16:30)      PT/INR - ( 16 Feb 2018 07:29 )   PT: 19.9 sec;   INR: 1.74 ratio         PTT - ( 15 Feb 2018 07:41 )  PTT:35.5 sec    LOWER EXTREMITY PHYSICAL EXAM:  Vasular: DP/PT 1/4, B/L, CFT wnl B/L, Temperature gradient wnl, B/L.   Neuro: Epicritic sensation intact.  Musculoskeletal/Ortho: No gross abnormalities  Skin: LEFT heel DTI with overlying blister.  No breaks in the skin.  No acute SOI.  Blister is filled with serous fluid.    RADIOLOGY & ADDITIONAL STUDIES:

## 2018-02-16 NOTE — PROGRESS NOTE ADULT - SUBJECTIVE AND OBJECTIVE BOX
Neck pain feels about the same   failed second TOV yesterday so mcadams reinserted    Vital Signs Last 24 Hrs  T(C): 36.7 (16 Feb 2018 04:08), Max: 37.7 (15 Feb 2018 18:50)  T(F): 98 (16 Feb 2018 04:08), Max: 99.8 (15 Feb 2018 18:50)  HR: 82 (16 Feb 2018 04:08) (75 - 83)  BP: 124/78 (16 Feb 2018 04:08) (123/68 - 126/72)  BP(mean): --  RR: 18 (16 Feb 2018 04:08) (17 - 18)  SpO2: 98% (16 Feb 2018 04:08) (96% - 98%)    GENERAL: NAD, at baseline MS  HEAD:  Atraumatic, Normocephalic  EYES: EOMI  NECK: Supple, No JVD, No LAD  CHEST/LUNG: Clear to auscultation bilaterally  HEART: Regular rate and rhythm; nl S1/S2; No murmurs, rubs, or gallops  ABDOMEN: Soft, Nontender, Nondistended; Bowel sounds present  EXTREMITIES:  2+ b/l LE edema; (+)mcadams draining clear yellow urine    LABS:                        11.3   13.56 )-----------( 285      ( 15 Feb 2018 07:41 )             34.9     02-16    136  |  100  |  22  ----------------------------<  240<H>  4.6   |  24  |  0.85    Ca    9.1      16 Feb 2018 07:41    TPro  6.2  /  Alb  2.3<L>  /  TBili  x   /  DBili  x   /  AST  x   /  ALT  x   /  AlkPhos  x   02-15    PT/INR - ( 16 Feb 2018 07:29 )   PT: 19.9 sec;   INR: 1.74 ratio         PTT - ( 15 Feb 2018 07:41 )  PTT:35.5 sec  CAPILLARY BLOOD GLUCOSE      POCT Blood Glucose.: 221 mg/dL (16 Feb 2018 08:37)  POCT Blood Glucose.: 214 mg/dL (15 Feb 2018 22:38)  POCT Blood Glucose.: 214 mg/dL (15 Feb 2018 16:30)  POCT Blood Glucose.: 233 mg/dL (15 Feb 2018 11:47)    CARDIAC MARKERS ( 15 Feb 2018 07:36 )  x     / x     / 47 U/L / x     / x

## 2018-02-16 NOTE — PROGRESS NOTE ADULT - SUBJECTIVE AND OBJECTIVE BOX
Neurology  Progress Note  Subjective: No new complaints, still with neck pain.  Underwent MRIs of the cervical and lumbar spine- degenerative changes, no mets.  Seen by rheum, started on prednisone    Medications:  acetaminophen   Tablet 650 milliGRAM(s) Oral every 6 hours PRN  cholecalciferol 1000 Unit(s) Oral daily  ciprofloxacin   IVPB 400 milliGRAM(s) IV Intermittent every 12 hours  dextrose 5%. 1000 milliLiter(s) IV Continuous <Continuous>  ibuprofen  Tablet 400 milliGRAM(s) Oral every 8 hours PRN  insulin lispro (HumaLOG) corrective regimen sliding scale   SubCutaneous at bedtime  insulin lispro (HumaLOG) corrective regimen sliding scale   SubCutaneous three times a day before meals  lidocaine   Patch 1 Patch Transdermal daily  metroNIDAZOLE  IVPB 500 milliGRAM(s) IV Intermittent every 8 hours  predniSONE   Tablet 5 milliGRAM(s) Oral two times a day  sodium chloride 0.9%. 1000 milliLiter(s) IV Continuous <Continuous>  umeclidinium 62.5 MICROgram(s)/vilanterol 25 MICROgram(s) Inhaler 1 Puff(s) Inhalation daily    Labs:  CBC Full  -  ( 15 Feb 2018 07:41 )  WBC Count : 13.56 K/uL  Hemoglobin : 11.3 g/dL  Hematocrit : 34.9 %  Platelet Count - Automated : 285 K/uL  Mean Cell Volume : 91.1 fl  Mean Cell Hemoglobin : 29.5 pg  Mean Cell Hemoglobin Concentration : 32.4 gm/dL  Auto Neutrophil # : x  Auto Lymphocyte # : x  Auto Monocyte # : x  Auto Eosinophil # : x  Auto Basophil # : x  Auto Neutrophil % : x  Auto Lymphocyte % : x  Auto Monocyte % : x  Auto Eosinophil % : x  Auto Basophil % : x    02-15    133<L>  |  98  |  18  ----------------------------<  176<H>  4.8   |  21<L>  |  0.79    Ca    8.8      15 Feb 2018 07:36    TPro  6.2  /  Alb  2.3<L>  /  TBili  x   /  DBili  x   /  AST  x   /  ALT  x   /  AlkPhos  x   02-15    POCT Blood Glucose.: 214 mg/dL (15 Feb 2018 22:38)  POCT Blood Glucose.: 214 mg/dL (15 Feb 2018 16:30)  POCT Blood Glucose.: 233 mg/dL (15 Feb 2018 11:47)    LIVER FUNCTIONS - ( 15 Feb 2018 07:41 )  Alb: 2.3 g/dL / Pro: 6.2 g/dL / ALK PHOS: x     / ALT: x     / AST: x     / GGT: x         PT/INR - ( 15 Feb 2018 07:41 )   PT: 22.6 sec;   INR: 1.97 ratio    PTT - ( 15 Feb 2018 07:41 )  PTT:35.5 sec  Vitals:  Vital Signs Last 24 Hrs  T(C): 36.7 (16 Feb 2018 04:08), Max: 37.7 (15 Feb 2018 18:50)  T(F): 98 (16 Feb 2018 04:08), Max: 99.8 (15 Feb 2018 18:50)  HR: 82 (16 Feb 2018 04:08) (75 - 83)  BP: 124/78 (16 Feb 2018 04:08) (123/68 - 126/72)  BP(mean): --  RR: 18 (16 Feb 2018 04:08) (17 - 18)  SpO2: 98% (16 Feb 2018 04:08) (96% - 98%)    NEUROLOGICAL EXAM:    Mental status: Awake, alert, and in no apparent distress. Oriented to self, hospital, 2018, irritable  Cranial Nerves: Pupils were equal, round, reactive to light. Extraocular movements were intact. Fundoscopic exam was deferred. Facial sensation was intact to light touch. There was no facial asymmetry. The palate was upgoing symmetrically and tongue was midline. Hearing acuity was intact to finger rub AU. Shoulder shrug was full bilaterally  Motor exam: Bulk and tone were normal. moving all extremities antigravity with resistance, grossly strong with limited cooperation  Reflexes: 2 in the bilateral upper extremities. 2 in the bilateral lower extremities. Toes were downgoing bilaterally.   Sensation: Intact to light touch, temperature.   Coordination: no gross dysmetria  Gait: deferred    < from: CT Head No Cont (02.04.18 @ 00:21) >    EXAM:  CT BRAIN                            PROCEDURE DATE:  02/04/2018      INTERPRETATION:  CLINICAL INFORMATION: Head trauma. Left leg weakness.    TECHNIQUE: Noncontrast axial CT images were acquired through the head.   Two-dimensional sagittal and coronal reformats were generated.    COMPARISON STUDY: CT head dated April 3, 2017    FINDINGS:     There is no CT evidence of acute intracranial hemorrhage or extra-axial   collection.    There is no CT evidence of an acute demarcated territorial infarct.    There are confluent periventricular white matter hypodensities compatible   with chronic microvascular-type change.     There is no vasogenic edema or mass effect.    The ventricles and sulci are normal in size and configuration forthe   patient's age.    The basal cisterns are patent.    There is mucosal disease in the left maxillary sinus. The mastoid air   cells and middle ear cavities are grossly clear.    The soft tissues of the scalp and face are unremarkable.    The bones of the calvarium, skull base, and face are unremarkable.    IMPRESSION:     No CT evidence of acute intracranial hemorrhage or mass effect.    No CT evidence of acute demarcated territorial infarct.     If the patient's symptoms persist, consider short interval follow-up head   CT or brain MRI if there are no MRI contraindications.    ALIRIO LINDSEY M.D., RADIOLOGY RESIDENT  This document has been electronically signed.  DOE LR M.D., ATTENDING RADIOLOGIST  This document has been electronically signed. Feb 4 2018 12:57AM      < from: MR Cervical Spine No Cont (02.14.18 @ 22:19) >  FINDINGS:  Vertebral bodies and Discs: No evidence of osseous metastatic disease at   the cervical spine level .  Alignment:  No subluxations.  C2-C3 level: Asymmetric bulge to the right indents the ventral thecal sac   and causes mild mass impression on the cord  C3-C4 level: Central broad-based disc protrusion with mass effect on the   ventral thecal sac without cord compression. Bilateral neural foraminal   narrowing on the basis of probable uncovertebral joint and facet joint   hypertrophic change .  C4-C5 level: Left greater than right uncovertebral joint degenerative  change narrows the neural foramina bilaterally  C5-C6 level: Bilateral neural foraminal narrowing bilaterally on the   basis of uncovertebral joint degenerative change.  C6-C7 level:  Normal.  C7-T1 level:  Normal.  Spinal cord:  Normal.  Spinal canal:  No other intradural or extradural defects are seen.  Miscellaneous:  None.      IMPRESSION: Degenerative changes with neural foraminal narrowing 3/4, 4/5   and 5/6. No cord compression    Lumbar spine:    FINDINGS:    VERTEBRAL BODIES AND DISCS: Normal.  ALIGNMENT:  No subluxations.  T12-L1: The conus is seen at this level. No canal compromise or foraminal   compromise  L1-L2 LEVEL: Symmetric bulge with triangularization of the thecal sac   with mild stenosis  L2-L3 LEVEL: Symmetric bulge with mass impression on the ventral thecal   sac and mild to moderate central stenosis  L3-L4 LEVEL: Bulge with mass impression on the ventral thecal sac and   mild to moderate central stenosis  L4-L5 LEVEL: Symmetric disc bulge with bilateral facet and ligamentous   hypertrophy with a moderate degree of central stenosis  L5-S1 LEVEL: Bulge ligamentous hypertrophy and facet joint hypertrophic   change contributes to a moderate to severe degree of central stenosis or   disc material in the lateral compartments bilaterally appears to impress   upon the exiting L5 nerve roots bilaterally  SPINAL CANAL:  No other intradural or extradural defects are seen.   CONUS MEDULLARIS:  Normal.  MISCELLANEOUS: Large left renal cyst     IMPRESSION: Multilevel degenerative changes. No evidence of osseous   metastatic disease. Most significant level of stenosis appears to be 5/ 1   in the lumbar spine region. Disc material is seen in the neural foramina   bilaterally at 51 appearing to encroach upon the exiting nerve roots   bilaterally    < end of copied text >

## 2018-02-16 NOTE — CONSULT NOTE ADULT - ASSESSMENT
LEFT heel DTI, blister.    Plan:  Patient seen and evaluated. All questions answered to patient's satisfaction.  Recommend Cavilon to protect skin  Offload with Zfloats.  Will cont to follow. If foot acutely worsens please call [phone]

## 2018-02-16 NOTE — PROGRESS NOTE ADULT - PROBLEM SELECTOR PLAN 6
TOV failed x 2  stopped duonebs because of anticholinergic component, will strongly consider switching her anoro to symbicort after speaking to her outside pulmonologist, but she is adamant about staying on anoro which she has been on x 3 years

## 2018-02-16 NOTE — CHART NOTE - NSCHARTNOTEFT_GEN_A_CORE
Blood sugars elevated   CAPILLARY BLOOD GLUCOSE  POCT Blood Glucose.: 200 mg/dL (16 Feb 2018 11:50)  POCT Blood Glucose.: 221 mg/dL (16 Feb 2018 08:37)  POCT Blood Glucose.: 214 mg/dL (15 Feb 2018 22:38)  POCT Blood Glucose.: 214 mg/dL (15 Feb 2018 16:30)    Low scale sliding scale changed to moderate scale  Will monitor sugars in the setting of steroid use    [id number]
Informed by RN that patients phosphate 1.4.     Patient febrile 102.6 (oral).  Seen and examined. Pt denies any muscle pains, spasm. she denies any abdominal pain, cramping. she is not feeling SOB or chest pain. Patient and family concerned over no abx treatment as per ID.      02-05    140  |  105  |  22  ----------------------------<  172<H>  4.2   |  20<L>  |  1.14    Ca    8.6      05 Feb 2018 06:43  Phos  1.4     02-05  Mg     1.8     02-05    TPro  7.6  /  Alb  3.6  /  TBili  0.9  /  DBili  x   /  AST  30  /  ALT  22  /  AlkPhos  66  02-03    Vital Signs Last 24 Hrs  T(C): 38.7 (05 Feb 2018 17:25), Max: 39.6 (05 Feb 2018 04:10)  T(F): 101.6 (05 Feb 2018 17:25), Max: 103.2 (05 Feb 2018 04:10)  HR: 78 (05 Feb 2018 15:51) (65 - 95)  BP: 155/58 (05 Feb 2018 15:51) (85/38 - 155/58)  BP(mean): --  RR: 18 (05 Feb 2018 15:51) (18 - 18)  SpO2: 99% (05 Feb 2018 15:51) (94% - 99%)    PT/INR - ( 05 Feb 2018 06:44 )   PT: 29.2 sec;   INR: 2.54 ratio    PTT - ( 05 Feb 2018 06:44 )  PTT:44.3 sec      MEDICATIONS  (STANDING):  ALBUTerol/ipratropium for Nebulization 3 milliLiter(s) Nebulizer every 6 hours  cholecalciferol 1000 Unit(s) Oral daily  dextrose 5%. 1000 milliLiter(s) (50 mL/Hr) IV Continuous <Continuous>  insulin lispro (HumaLOG) corrective regimen sliding scale   SubCutaneous three times a day before meals  sodium chloride 0.9%. 1000 milliLiter(s) (50 mL/Hr) IV Continuous <Continuous>  umeclidinium 62.5 MICROgram(s)/vilanterol 25 MICROgram(s) Inhaler 1 Puff(s) Inhalation daily  warfarin 5 milliGRAM(s) Oral once    Renal U/S- no evidence of renal collection  ECHO: mitral annual calcifications, mild-moderate mitral regurgitation, moderate diastolic dysfunction (stage II)      Plan:     -order sodium phosphate 30 millimole in D5W IVBP over 6 hrs.   -Repeat phosphate along with AM labs ordered.  -Tylenol for temperature  -Called Dr. Loving in ID- patient's BCX x 24 hrs, urine cx negative. pt RVP negative. D/C abx unless definitive dx determined for treatment. If pt develops diarrhea may send for Cdiff. May be unknown viral etiology??  -Continue to monitor temp and vitals.
MEDICINE NP    CHARLEENMARU  82y Female      > Event Summary: Follow -up TOV  Notified by RN, Patient with Bladder Scan PVR w/ >500ml and patient with urgency.    -Galan re-insertion ordered  -f/u with Primary Care Team in AM      JORDAN Smith-BC  Medicine Department  #[id number]
MEDICINE NP    Notified by RN patient with urinary retention 790ml .  83 y/o F--patient seen with adult daughter and son, son in law in attendance--patient with a history of lung CA with past LEFT lower lobectomy for lung CA presumed to be in remission, COPD, malignant melanoma, breast CA with past tylectomy presumed to be disease free, type 2 DM but not on current treatment, past RIGHT DVT on Coumadin with recent discharge from [place] in January 2018 for a LEFT leg DVT with patient resumed on Coumadin per patient's preference, with patient apparently initially declining rehab referral from last admission, with patient brought in by daughter following patient calling for help following daughter noted patient to be in a plank position at home after patient was attempting to break a fall.  Patient with fever, chills, rigors at home.  Patient with increased urination.  No HA, no focal weakness.  No LOC or head trauma. Seen and examined patient at bedside. Patient is alert, NAD. Denies abd pain/discomfort but reports discomfort on palpation at pubis symphysis. Galan placement for urinary retention.
Notified by RN - patient with minimal voiding overnight and spending long time on bed pan. Bladder scan was done showing >800 ml. Patient having difficulty voiding, denies pain/discomfort. Galan ordered for urinary retention, will send UA. Will f/u primary team in AM.    Wally Osullivan PA-C  Department of Medicine  #[id number]
Patient is a 82y old  Female who presents with a chief complaint of Patient noted by daughter to be on a plank position at home with patient attempting to break a fall, chills, fever now with a temperature of 102 rectally.      Vital Signs Last 24 Hrs  T(C): 39.2 (11 Feb 2018 12:40), Max: 39.2 (11 Feb 2018 12:40)  T(F): 102.5 (11 Feb 2018 12:40), Max: 102.5 (11 Feb 2018 12:40)  HR: 89 (11 Feb 2018 11:55) (78 - 89)  BP: 129/73 (11 Feb 2018 11:55) (121/69 - 129/73)  BP(mean): --  RR: 17 (11 Feb 2018 11:55) (17 - 20)  SpO2: 99% (11 Feb 2018 11:55) (94% - 99%)                        11.9   15.71 )-----------( 286      ( 11 Feb 2018 09:11 )             36.2     02-10    137  |  101  |  22  ----------------------------<  208<H>  3.7   |  23  |  0.85    Ca    8.8      10 Feb 2018 12:11      PT/INR - ( 11 Feb 2018 09:09 )   PT: 18.9 sec;   INR: 1.66 ratio             General: NAD, sitting in chair with daughter Olimpia at bedside  Neurology: A&Ox1-2 periods of forgetfulness  Respiratory: CTA B/L  CV: RRR, S1S2  Abdominal: Soft, NT, ND   MSK:  + peripheral pulses, FROM all 4 extremity    A/P  HPI:  83 y/o F with a history of lung CA with past LEFT lower lobectomy for lung CA presumed to be in remission, COPD, malignant melanoma, breast CA with past tylectomy presumed to be disease free, type 2 DM but not on current treatment, past RIGHT DVT on Coumadin with recent discharge from [place] in January 2018 for a LEFT leg DVT with patient resumed on Coumadin per patient's preference, with patient apparently initially declining rehab referral from last admission, with patient brought in by daughter following patient calling for help following daughter noted patient to be in a plank position at home after patient was attempting to break a fall.  Patient with fever, chills, rigors at home.  Patient with increased urination.  No HA, no focal weakness.  No LOC or head trauma.  No fecal or urinary incontinence.  No back pain, no tearing back pain.  No hip or extremity pain.  No abdominal pain, no red blood per rectum or melena now with a temperature of 102.    -Fever  -Tylenol 650mg PO x1  -BC x 2, UA, UC, CXR, RVP ordered  -ID to see patient  -Dr. Morocho notified  Will continue to monitor pt closely. Family at bedside, Daughter Olimpia and discussed plan.     SARAN Mendez  Spectra-link [id number]
Source: Patient [ X]    Family [ ]     other [ X] RN, Medical record     Pt seen for nutrition follow up. Pt seen in bed, reports feeling okay but is frustrated regarding her LOS. Pt reports a good PO intake, and denies food preferences as this time. Pt denies need for education review at this time. Pt has limited interest in RD interview, states she was in pain earlier and just wants to rest. Pt aware that RD remains available    Per chart Pt admitted for near fall. Per chart, Pt with history of lung cancer, breast cancer, T2DM, CHF, and DVT. Per chart, Pt with urosepsis and DVT in lower extremity.  fever resolves, Pt failed TOV.     Diet : Consistent CHO, DASH, soft     Patient reports no GI distress      PO intake:  25-75%     Source for PO intake [X ] Patient [ ] family [ X] chart [ ] staff [ ] other      Current Weight: 180lbs, ? accuracy of weight gain vs fluid retention   % Weight Change    Pertinent Medications: MEDICATIONS  (STANDING):  ALBUTerol/ipratropium for Nebulization 3 milliLiter(s) Nebulizer every 6 hours  cholecalciferol 1000 Unit(s) Oral daily  ciprofloxacin   IVPB 400 milliGRAM(s) IV Intermittent every 12 hours  dextrose 5%. 1000 milliLiter(s) (50 mL/Hr) IV Continuous <Continuous>  insulin lispro (HumaLOG) corrective regimen sliding scale   SubCutaneous at bedtime  insulin lispro (HumaLOG) corrective regimen sliding scale   SubCutaneous three times a day before meals  lidocaine   Patch 1 Patch Transdermal daily  metroNIDAZOLE  IVPB 500 milliGRAM(s) IV Intermittent every 8 hours  sodium chloride 0.9%. 1000 milliLiter(s) (50 mL/Hr) IV Continuous <Continuous>  umeclidinium 62.5 MICROgram(s)/vilanterol 25 MICROgram(s) Inhaler 1 Puff(s) Inhalation daily  warfarin 5 milliGRAM(s) Oral once    MEDICATIONS  (PRN):  acetaminophen   Tablet 650 milliGRAM(s) Oral every 6 hours PRN For Temp greater than 38 C (100.4 F)  ibuprofen  Tablet 400 milliGRAM(s) Oral every 8 hours PRN pain    Pertinent Labs:  02-13 Na134 mmol/L<L> Glu 200 mg/dL<H> K+ 4.1 mmol/L Cr  0.77 mg/dL BUN 22 mg/dL Bg levels: 2/13: 180-206, 2/12: 180-260    Skin: Left heel DTI, +2 bi foot edema     Estimated Needs:   [ X] no change since previous assessment  [ ] recalculated:       Previous Nutrition Diagnosis:     [X] Food & Nutrition Related Knowledge Deficit     Nutrition Diagnosis is [X ] ongoing         New Nutrition Diagnosis: [X ] not applicable    Recommend    1. Provide food preferences as requested by Pt/family within diet restrictions    2. Encourage PO intake during meal times   3. Pt denies education at this time       Monitoring and Evaluation:     [X ] PO intake [X ] Tolerance to diet prescription [X ] weights [ X] follow up per protocol    [X ] other: RD remains available Sarah Siegler RD. Pager #[phone]
Called by RN that, pt's BP was 85/38 at HR 71 @ 2200. Pt was evaluated. Denies CP/SOB/ Palpitations/ Diaphoresis, HA/Dizziness/ Blurry vision/ syncope, fever/ chills, Abdominal Pain/N/V/D/C, orthopnea/ PND, cough/ runny nose.     Vital Signs Last 24 Hrs  T(C): 37.7 (05 Feb 2018 02:52), Max: 39.2 (04 Feb 2018 15:38)  T(F): 99.9 (05 Feb 2018 02:52), Max: 102.5 (04 Feb 2018 15:38)  HR: 71 (04 Feb 2018 22:30) (60 - 88)  BP: 153/79 (05 Feb 2018 02:52) (85/38 - 153/79)  BP(mean): --  RR: 18 (04 Feb 2018 22:30) (16 - 18)  SpO2: 98% (04 Feb 2018 22:30) (94% - 100%)    PAST MEDICAL & SURGICAL HISTORY:  Breast cancer  Lung cancer, left  Diabetes mellitus: diet controlled diabetes  Afib  S/P colectomy  Status post left hip replacement  S/P lobectomy of lung: s/p LLL Lobectomy 6/2014 for lung CA      MEDICATIONS  (STANDING):  ALBUTerol/ipratropium for Nebulization 3 milliLiter(s) Nebulizer every 6 hours  aztreonam  IVPB 500 milliGRAM(s) IV Intermittent every 12 hours  aztreonam  IVPB      cholecalciferol 1000 Unit(s) Oral daily  dextrose 5%. 1000 milliLiter(s) (50 mL/Hr) IV Continuous <Continuous>  dextrose 50% Injectable 12.5 Gram(s) IV Push once  dextrose 50% Injectable 25 Gram(s) IV Push once  dextrose 50% Injectable 25 Gram(s) IV Push once  insulin lispro (HumaLOG) corrective regimen sliding scale   SubCutaneous two times a day before meals  sodium chloride 0.9%. 1000 milliLiter(s) (50 mL/Hr) IV Continuous <Continuous>  umeclidinium 62.5 MICROgram(s)/vilanterol 25 MICROgram(s) Inhaler 1 Puff(s) Inhalation daily    MEDICATIONS  (PRN):  acetaminophen   Tablet 650 milliGRAM(s) Oral every 6 hours PRN For Temp greater than 38 C (100.4 F)  dextrose Gel 1 Dose(s) Oral once PRN Blood Glucose LESS THAN 70 milliGRAM(s)/deciliter  glucagon  Injectable 1 milliGRAM(s) IntraMuscular once PRN Glucose LESS THAN 70 milligrams/deciliter                          12.0   10.0  )-----------( 134      ( 04 Feb 2018 23:13 )             36.6     02-04    141  |  107  |  24<H>  ----------------------------<  184<H>  4.5   |  23  |  1.25    Ca    8.3<L>      04 Feb 2018 23:13  Phos  2.0     02-04  Mg     1.9     02-04    TPro  7.6  /  Alb  3.6  /  TBili  0.9  /  DBili  x   /  AST  30  /  ALT  22  /  AlkPhos  66  02-03    PT/INR - ( 04 Feb 2018 06:34 )   PT: 28.2 sec;   INR: 2.45 ratio         PTT - ( 04 Feb 2018 06:34 )  PTT:47.6 sec    CARDIAC MARKERS ( 04 Feb 2018 23:13 )  x     / <0.01 ng/mL / x     / x     / 1.1 ng/mL  CARDIAC MARKERS ( 03 Feb 2018 23:23 )  x     / <0.01 ng/mL / x     / x     / x            A/P: 83 y/o F with PMH of lung CA with past LEFT lower lobectomy for lung CA presumed to be in remission, COPD, malignant melanoma, breast CA with past tylectomy presumed to be disease free, type 2 DM but not on current treatment, past RIGHT DVT on Coumadin with recent discharge from [place] in January 2018 for a LEFT leg DVT with patient resumed on Coumadin per patient's preference, with patient apparently initially declining rehab referral from last admission, with patient brought in by daughter following patient calling for help following daughter noted patient to be in a plank position at home after patient was attempting to break a fall.  Patient with fever, chills, rigors at home. Pt admitted with Urosepsis on Azactam.     # Urosepsis.   - WBC: 10.0. Lactate: 0.7.   - K/Mg/ PO: 4.5/ 1.9/2.0.   - CEX1: Neg. EKG: SR, no KARLOS, TWI.   - ABG: pH: 7.42  /  pCO2: 34    /  pO2: 160   / HCO3: 21    / Base Excess: -2.0  /  SaO2: 100     - s/p IVF 250cc/hr.   - c/w IVF @ 50cc/hr.   - c/w Azactam.   - Repeat BP: 102/62 @ HR 71.   - f/u Urine Cx, BCX.   - Will endorse to primary team in am.       SUSHIL. KEANU VELIZ   # [id number]  Medicine [facility]

## 2018-02-16 NOTE — PROGRESS NOTE ADULT - PROBLEM SELECTOR PLAN 1
will continue cipro/flagyl x total 7 days to cover proctitis  CURTIS testing  RF elevated, anti-CCP is negative, Sjogren's, scleroderma, dsDNA are testing; SPEP/UPEP/immunofixation c/w acute inflammation  on prednisone 5 mg bid for ?PMR?  rheum consult  doubt pt has viral meningitis, much less bacterial meningitis

## 2018-02-16 NOTE — PROVIDER CONTACT NOTE (OTHER) - DATE AND TIME:
13-Feb-2018 06:00
13-Feb-2018 22:00
04-Feb-2018 15:25
04-Feb-2018 22:00
05-Feb-2018 06:30
05-Feb-2018 16:20
06-Feb-2018 05:05
06-Feb-2018 16:00
07-Feb-2018 22:15
08-Feb-2018 02:05
15-Feb-2018 20:30

## 2018-02-16 NOTE — PROVIDER CONTACT NOTE (OTHER) - SITUATION
new DTI on left heel noted
patient c/o headache, periods of confusion
Pt bradycardic 40-50's on tele
Pt had PSVT for 3.5 secs  on cardiac monitor
Pt is lethargic with a BP of 87/52. Pt only aroused with constant stimulation and sternal rub.
Pt s/p mcadams, pending TOV   Bladder Scan revealed 588mL post void incontinent count
patient had fever last night ,on abs
temp 101.6

## 2018-02-16 NOTE — PROVIDER CONTACT NOTE (OTHER) - ASSESSMENT
Pt is alert and oriented x 3 forgetful. Pt has less urine output, and difficulty to pee. Pt took long time to sit in bedpan and won't let PCA and RN to take it off, pt denies any distress, but RN noted that pt straining too much for passing urine and output was 70ml. Pt refused to do bladder scan initially, but after multiple encouragement pt agreed
Pt is alert and oriented x3, on 2L O2 NC. Pt was NPO for 4 hours for CT abd/CT chest. CT done at 9pm and Benadryl 50 mg IV push given at 8pm( pt allergic to contrast). Pt did not take dinner, Family at bedside. VSS. Pt was sweating according to family before the shift. Pt had fever during day time. Pt is alert, obeys verbal commands, but falling sleep. NS at 50ml infusing .
new DTI on left heel noted
patient c/o headache, periods of confusion
patient has oral temp 102.1f
HR on tele as low as 46 sustaining. Pt asleep during event, upon assessment no distress noted. VSS, please see flowsheet.
Pt is asymptomatic, VSS. Pt has no c/o of CP, SOB, and/or palpitations.
Pt is lethargic, only aroused to constant stimuli and sternal rub. BP is 87/52, all other VSS.
patient alertx3 forgetful
pt denies any symptoms, no pressure, no pain to lower abdomen  upon assessment no tenderness or bulging noted

## 2018-02-17 DIAGNOSIS — Z02.9 ENCOUNTER FOR ADMINISTRATIVE EXAMINATIONS, UNSPECIFIED: ICD-10-CM

## 2018-02-17 LAB
GLUCOSE BLDC GLUCOMTR-MCNC: 160 MG/DL — HIGH (ref 70–99)
GLUCOSE BLDC GLUCOMTR-MCNC: 192 MG/DL — HIGH (ref 70–99)
GLUCOSE BLDC GLUCOMTR-MCNC: 299 MG/DL — HIGH (ref 70–99)
GLUCOSE BLDC GLUCOMTR-MCNC: 306 MG/DL — HIGH (ref 70–99)
HCT VFR BLD CALC: 33.6 % — LOW (ref 34.5–45)
HGB BLD-MCNC: 10.5 G/DL — LOW (ref 11.5–15.5)
INR BLD: 1.97 RATIO — HIGH (ref 0.88–1.16)
MCHC RBC-ENTMCNC: 28.5 PG — SIGNIFICANT CHANGE UP (ref 27–34)
MCHC RBC-ENTMCNC: 31.3 GM/DL — LOW (ref 32–36)
MCV RBC AUTO: 91.3 FL — SIGNIFICANT CHANGE UP (ref 80–100)
PLATELET # BLD AUTO: 374 K/UL — SIGNIFICANT CHANGE UP (ref 150–400)
PROTHROM AB SERPL-ACNC: 22.6 SEC — HIGH (ref 10–13.1)
RBC # BLD: 3.68 M/UL — LOW (ref 3.8–5.2)
RBC # FLD: 15.1 % — HIGH (ref 10.3–14.5)
WBC # BLD: 7.83 K/UL — SIGNIFICANT CHANGE UP (ref 3.8–10.5)
WBC # FLD AUTO: 7.83 K/UL — SIGNIFICANT CHANGE UP (ref 3.8–10.5)

## 2018-02-17 RX ORDER — WARFARIN SODIUM 2.5 MG/1
6 TABLET ORAL ONCE
Qty: 0 | Refills: 0 | Status: COMPLETED | OUTPATIENT
Start: 2018-02-17 | End: 2018-02-17

## 2018-02-17 RX ADMIN — Medication 400 MILLIGRAM(S): at 21:38

## 2018-02-17 RX ADMIN — Medication 100 MILLIGRAM(S): at 15:33

## 2018-02-17 RX ADMIN — Medication 100 MILLIGRAM(S): at 05:17

## 2018-02-17 RX ADMIN — Medication 5 MILLIGRAM(S): at 05:16

## 2018-02-17 RX ADMIN — Medication 2: at 08:25

## 2018-02-17 RX ADMIN — Medication 5 MILLIGRAM(S): at 17:06

## 2018-02-17 RX ADMIN — WARFARIN SODIUM 6 MILLIGRAM(S): 2.5 TABLET ORAL at 21:37

## 2018-02-17 RX ADMIN — Medication 400 MILLIGRAM(S): at 22:35

## 2018-02-17 RX ADMIN — Medication 200 MILLIGRAM(S): at 19:57

## 2018-02-17 RX ADMIN — Medication 1000 UNIT(S): at 12:09

## 2018-02-17 RX ADMIN — Medication 8: at 12:09

## 2018-02-17 RX ADMIN — Medication 200 MILLIGRAM(S): at 08:28

## 2018-02-17 RX ADMIN — Medication 2: at 17:05

## 2018-02-17 RX ADMIN — LIDOCAINE 1 PATCH: 4 CREAM TOPICAL at 12:09

## 2018-02-17 RX ADMIN — Medication 650 MILLIGRAM(S): at 15:01

## 2018-02-17 RX ADMIN — Medication 2: at 21:37

## 2018-02-17 RX ADMIN — UMECLIDINIUM BROMIDE AND VILANTEROL TRIFENATATE 1 PUFF(S): 62.5; 25 POWDER RESPIRATORY (INHALATION) at 08:28

## 2018-02-17 NOTE — OCCUPATIONAL THERAPY INITIAL EVALUATION ADULT - PERTINENT HX OF CURRENT PROBLEM, REHAB EVAL
81 y/o F, w/ a history of lung CA, past L lower lobectomy for lung CA presumed to be in remission, COPD, malignant melanoma, breast CA w/ past tylectomy presumed to be disease free, type 2 DM-not on current treatment, past R DVT on Coumadin w/ recent discharge from [place] in January 2018 for a Lleg DVT w/ pt resumed on Coumadin per patient's preference...see below

## 2018-02-17 NOTE — PROGRESS NOTE ADULT - PROBLEM SELECTOR PLAN 6
TOV failed x 2; daughter requesting 3rd trial Sunday after PT  stopped duonebs because of anticholinergic component, will strongly consider switching her anoro to symbicort after speaking to her outside pulmonologist, but she is adamant about staying on anoro which she has been on x 3 years

## 2018-02-17 NOTE — PROGRESS NOTE ADULT - PROBLEM SELECTOR PLAN 1
will continue cipro/flagyl x total 7 days to cover proctitis; today is last day  CURTIS, anti-CCP are negative  SPEP/UPEP/immunofixation c/w acute inflammation  on prednisone 5 mg bid for ?PMR?  rheum consult appreciated  doubt pt has viral meningitis, much less bacterial meningitis

## 2018-02-17 NOTE — OCCUPATIONAL THERAPY INITIAL EVALUATION ADULT - ADDITIONAL COMMENTS
Pt initially declined rehab referral from last admission. Pt brought in by daughter who found her in a plank position at home after pt was attempting to break a fall. Pt w/ fever, chills, rigors at home, increased urination. No LOC or head trauma. CT cervical spine 2/12: multilevel degenerative changes, w/ central canal &foraminal narrowing. CT chest/ abdomen/pelvis 2/6:Suspicious for proctitis.  Interval development of small b/l pleural effusions, R>L, associated w/ subsegmental atelectasis. Centrilobular emphysema. CT brain 2/4:(-). MR Spine Thoracic 2/15: (-). MR spine lumbar/cervical 2/14: mulitlevel degenerative changes. Stenosis at 5/1 lumbar spine, disc material in neural foramina b/l, encroach upon existing nerve roots b/l. CXR 2/11: b/l trace pleural effusions.

## 2018-02-17 NOTE — PROGRESS NOTE ADULT - SUBJECTIVE AND OBJECTIVE BOX
Says her overall aching pain is unchanged despite the prednisone    Vital Signs Last 24 Hrs  T(C): 36.3 (17 Feb 2018 04:02), Max: 37.1 (16 Feb 2018 18:07)  T(F): 97.3 (17 Feb 2018 04:02), Max: 98.8 (16 Feb 2018 18:07)  HR: 66 (17 Feb 2018 04:02) (66 - 77)  BP: 106/63 (17 Feb 2018 04:02) (106/63 - 131/76)  BP(mean): --  RR: 18 (17 Feb 2018 04:02) (18 - 18)  SpO2: 97% (17 Feb 2018 04:02) (97% - 99%)    GENERAL: NAD, at baseline MS  HEAD:  Atraumatic, Normocephalic  EYES: EOMI  NECK: Supple, No JVD, No LAD  CHEST/LUNG: Clear to auscultation bilaterally  HEART: Regular rate and rhythm; nl S1/S2; No murmurs, rubs, or gallops  ABDOMEN: Soft, Nontender, Nondistended; Bowel sounds present  EXTREMITIES:  2+ b/l LE edema; (+)mcadams draining clear yellow urine    LABS:                        11.2   9.11  )-----------( 322      ( 16 Feb 2018 07:29 )             34.5     02-16    136  |  100  |  22  ----------------------------<  240<H>  4.6   |  24  |  0.85    Ca    9.1      16 Feb 2018 07:41      PT/INR - ( 17 Feb 2018 08:56 )   PT: 22.6 sec;   INR: 1.97 ratio           CAPILLARY BLOOD GLUCOSE      POCT Blood Glucose.: 160 mg/dL (17 Feb 2018 07:30)  POCT Blood Glucose.: 178 mg/dL (16 Feb 2018 21:03)  POCT Blood Glucose.: 208 mg/dL (16 Feb 2018 16:30)  POCT Blood Glucose.: 200 mg/dL (16 Feb 2018 11:50)

## 2018-02-17 NOTE — OCCUPATIONAL THERAPY INITIAL EVALUATION ADULT - LIVES WITH, PROFILE
children/Pt lives in upstairs apt of children's house, has 1 flight of stairs to get into apt, and a tub shower. Pt previously independent in all ADLS/IADLs.

## 2018-02-18 LAB
GLUCOSE BLDC GLUCOMTR-MCNC: 211 MG/DL — HIGH (ref 70–99)
GLUCOSE BLDC GLUCOMTR-MCNC: 232 MG/DL — HIGH (ref 70–99)
GLUCOSE BLDC GLUCOMTR-MCNC: 243 MG/DL — HIGH (ref 70–99)
GLUCOSE BLDC GLUCOMTR-MCNC: 266 MG/DL — HIGH (ref 70–99)
INR BLD: 2.34 RATIO — HIGH (ref 0.88–1.16)
PROTHROM AB SERPL-ACNC: 26.9 SEC — HIGH (ref 10–13.1)

## 2018-02-18 RX ORDER — WARFARIN SODIUM 2.5 MG/1
6 TABLET ORAL ONCE
Qty: 0 | Refills: 0 | Status: COMPLETED | OUTPATIENT
Start: 2018-02-18 | End: 2018-02-18

## 2018-02-18 RX ADMIN — Medication 1000 UNIT(S): at 12:00

## 2018-02-18 RX ADMIN — Medication 400 MILLIGRAM(S): at 21:39

## 2018-02-18 RX ADMIN — Medication 400 MILLIGRAM(S): at 21:09

## 2018-02-18 RX ADMIN — Medication 4: at 08:10

## 2018-02-18 RX ADMIN — UMECLIDINIUM BROMIDE AND VILANTEROL TRIFENATATE 1 PUFF(S): 62.5; 25 POWDER RESPIRATORY (INHALATION) at 09:16

## 2018-02-18 RX ADMIN — Medication 2: at 22:26

## 2018-02-18 RX ADMIN — Medication 4: at 12:00

## 2018-02-18 RX ADMIN — Medication 5 MILLIGRAM(S): at 06:39

## 2018-02-18 RX ADMIN — Medication 4: at 17:32

## 2018-02-18 RX ADMIN — Medication 5 MILLIGRAM(S): at 17:31

## 2018-02-18 RX ADMIN — WARFARIN SODIUM 6 MILLIGRAM(S): 2.5 TABLET ORAL at 21:09

## 2018-02-18 RX ADMIN — LIDOCAINE 1 PATCH: 4 CREAM TOPICAL at 12:00

## 2018-02-18 NOTE — PROGRESS NOTE ADULT - PROBLEM SELECTOR PLAN 6
TOV failed x 2; will attempt 3rd trial Monday after PT  stopped duonebs because of anticholinergic component, will strongly consider switching her anoro to symbicort after speaking to her outside pulmonologist, but she is adamant about staying on anoro which she has been on x 3 years

## 2018-02-18 NOTE — PROGRESS NOTE ADULT - SUBJECTIVE AND OBJECTIVE BOX
No new events overnight    Vital Signs Last 24 Hrs  T(C): 36.3 (18 Feb 2018 04:22), Max: 36.3 (17 Feb 2018 20:59)  T(F): 97.4 (18 Feb 2018 04:22), Max: 97.4 (17 Feb 2018 20:59)  HR: 62 (18 Feb 2018 04:22) (62 - 72)  BP: 119/73 (18 Feb 2018 04:22) (105/64 - 124/71)  BP(mean): --  RR: 18 (18 Feb 2018 04:22) (18 - 18)  SpO2: 99% (18 Feb 2018 04:22) (98% - 99%)    GENERAL: NAD, at baseline MS  HEAD:  Atraumatic, Normocephalic  EYES: EOMI  NECK: Supple, No JVD, No LAD  CHEST/LUNG: Clear to auscultation bilaterally  HEART: Regular rate and rhythm; nl S1/S2; No murmurs, rubs, or gallops  ABDOMEN: Soft, Nontender, Nondistended; Bowel sounds present  EXTREMITIES:  2+ b/l LE edema; (+)mcadams draining clear yellow urine    LABS:                        10.5   7.83  )-----------( 374      ( 17 Feb 2018 08:54 )             33.6           PT/INR - ( 18 Feb 2018 06:36 )   PT: 26.9 sec;   INR: 2.34 ratio           CAPILLARY BLOOD GLUCOSE      POCT Blood Glucose.: 211 mg/dL (18 Feb 2018 07:35)  POCT Blood Glucose.: 299 mg/dL (17 Feb 2018 21:02)  POCT Blood Glucose.: 192 mg/dL (17 Feb 2018 16:36)  POCT Blood Glucose.: 306 mg/dL (17 Feb 2018 11:56)

## 2018-02-18 NOTE — PROGRESS NOTE ADULT - PROBLEM SELECTOR PLAN 1
finished cipro/flagyl x 7 days for asymptomatic proctitis  CURTIS, anti-CCP are negative  SPEP/UPEP/immunofixation c/w acute inflammation  on prednisone 5 mg bid for ?PMR?  rheum consult appreciated

## 2018-02-19 LAB
ANION GAP SERPL CALC-SCNC: 9 MMOL/L — SIGNIFICANT CHANGE UP (ref 5–17)
APTT BLD: 36.5 SEC — SIGNIFICANT CHANGE UP (ref 27.5–37.4)
BUN SERPL-MCNC: 26 MG/DL — HIGH (ref 7–23)
CALCIUM SERPL-MCNC: 9.3 MG/DL — SIGNIFICANT CHANGE UP (ref 8.4–10.5)
CHLORIDE SERPL-SCNC: 102 MMOL/L — SIGNIFICANT CHANGE UP (ref 96–108)
CO2 SERPL-SCNC: 27 MMOL/L — SIGNIFICANT CHANGE UP (ref 22–31)
CREAT SERPL-MCNC: 0.77 MG/DL — SIGNIFICANT CHANGE UP (ref 0.5–1.3)
GLUCOSE BLDC GLUCOMTR-MCNC: 168 MG/DL — HIGH (ref 70–99)
GLUCOSE BLDC GLUCOMTR-MCNC: 194 MG/DL — HIGH (ref 70–99)
GLUCOSE BLDC GLUCOMTR-MCNC: 235 MG/DL — HIGH (ref 70–99)
GLUCOSE BLDC GLUCOMTR-MCNC: 252 MG/DL — HIGH (ref 70–99)
GLUCOSE SERPL-MCNC: 203 MG/DL — HIGH (ref 70–99)
HCT VFR BLD CALC: 34.9 % — SIGNIFICANT CHANGE UP (ref 34.5–45)
HGB BLD-MCNC: 11.1 G/DL — LOW (ref 11.5–15.5)
INR BLD: 2.87 RATIO — HIGH (ref 0.88–1.16)
MCHC RBC-ENTMCNC: 29.1 PG — SIGNIFICANT CHANGE UP (ref 27–34)
MCHC RBC-ENTMCNC: 31.8 GM/DL — LOW (ref 32–36)
MCV RBC AUTO: 91.6 FL — SIGNIFICANT CHANGE UP (ref 80–100)
PLATELET # BLD AUTO: 459 K/UL — HIGH (ref 150–400)
POTASSIUM SERPL-MCNC: 4.5 MMOL/L — SIGNIFICANT CHANGE UP (ref 3.5–5.3)
POTASSIUM SERPL-SCNC: 4.5 MMOL/L — SIGNIFICANT CHANGE UP (ref 3.5–5.3)
PROTHROM AB SERPL-ACNC: 33.1 SEC — HIGH (ref 10–13.1)
RBC # BLD: 3.81 M/UL — SIGNIFICANT CHANGE UP (ref 3.8–5.2)
RBC # FLD: 14.9 % — HIGH (ref 10.3–14.5)
SODIUM SERPL-SCNC: 138 MMOL/L — SIGNIFICANT CHANGE UP (ref 135–145)
WBC # BLD: 7.1 K/UL — SIGNIFICANT CHANGE UP (ref 3.8–10.5)
WBC # FLD AUTO: 7.1 K/UL — SIGNIFICANT CHANGE UP (ref 3.8–10.5)

## 2018-02-19 RX ORDER — WARFARIN SODIUM 2.5 MG/1
5 TABLET ORAL ONCE
Qty: 0 | Refills: 0 | Status: COMPLETED | OUTPATIENT
Start: 2018-02-19 | End: 2018-02-19

## 2018-02-19 RX ADMIN — Medication 5 MILLIGRAM(S): at 06:56

## 2018-02-19 RX ADMIN — Medication 650 MILLIGRAM(S): at 23:28

## 2018-02-19 RX ADMIN — Medication 5 MILLIGRAM(S): at 17:57

## 2018-02-19 RX ADMIN — Medication 6: at 12:50

## 2018-02-19 RX ADMIN — Medication 2: at 17:01

## 2018-02-19 RX ADMIN — LIDOCAINE 1 PATCH: 4 CREAM TOPICAL at 11:57

## 2018-02-19 RX ADMIN — WARFARIN SODIUM 5 MILLIGRAM(S): 2.5 TABLET ORAL at 21:48

## 2018-02-19 RX ADMIN — Medication 2: at 08:14

## 2018-02-19 RX ADMIN — UMECLIDINIUM BROMIDE AND VILANTEROL TRIFENATATE 1 PUFF(S): 62.5; 25 POWDER RESPIRATORY (INHALATION) at 09:24

## 2018-02-19 RX ADMIN — Medication 1000 UNIT(S): at 11:57

## 2018-02-19 RX ADMIN — Medication 650 MILLIGRAM(S): at 06:56

## 2018-02-19 NOTE — PROGRESS NOTE ADULT - SUBJECTIVE AND OBJECTIVE BOX
SUBJECTIVE: wants to go to rehab.  c/o some mild posterior head pain.    Medications:  MEDICATIONS  (STANDING):  cholecalciferol 1000 Unit(s) Oral daily  dextrose 5%. 1000 milliLiter(s) (50 mL/Hr) IV Continuous <Continuous>  insulin lispro (HumaLOG) corrective regimen sliding scale   SubCutaneous three times a day before meals  insulin lispro (HumaLOG) corrective regimen sliding scale   SubCutaneous at bedtime  lidocaine   Patch 1 Patch Transdermal daily  predniSONE   Tablet 5 milliGRAM(s) Oral two times a day  sodium chloride 0.9%. 1000 milliLiter(s) (50 mL/Hr) IV Continuous <Continuous>  umeclidinium 62.5 MICROgram(s)/vilanterol 25 MICROgram(s) Inhaler 1 Puff(s) Inhalation daily    MEDICATIONS  (PRN):  acetaminophen   Tablet 650 milliGRAM(s) Oral every 6 hours PRN For Temp greater than 38 C (100.4 F)  ibuprofen  Tablet 400 milliGRAM(s) Oral every 8 hours PRN pain      Labs:  CBC Full  -  ( 19 Feb 2018 06:39 )  WBC Count : 7.10 K/uL  Hemoglobin : 11.1 g/dL  Hematocrit : 34.9 %  Platelet Count - Automated : 459 K/uL  Mean Cell Volume : 91.6 fl  Mean Cell Hemoglobin : 29.1 pg  Mean Cell Hemoglobin Concentration : 31.8 gm/dL  Auto Neutrophil # : x  Auto Lymphocyte # : x  Auto Monocyte # : x  Auto Eosinophil # : x  Auto Basophil # : x  Auto Neutrophil % : x  Auto Lymphocyte % : x  Auto Monocyte % : x  Auto Eosinophil % : x  Auto Basophil % : x    02-19    138  |  102  |  26<H>  ----------------------------<  203<H>  4.5   |  27  |  0.77    Ca    9.3      19 Feb 2018 07:41      CAPILLARY BLOOD GLUCOSE      POCT Blood Glucose.: 168 mg/dL (19 Feb 2018 07:38)  POCT Blood Glucose.: 266 mg/dL (18 Feb 2018 22:10)  POCT Blood Glucose.: 232 mg/dL (18 Feb 2018 16:40)  POCT Blood Glucose.: 243 mg/dL (18 Feb 2018 11:56)      PT/INR - ( 19 Feb 2018 06:39 )   PT: 33.1 sec;   INR: 2.87 ratio         PTT - ( 19 Feb 2018 06:39 )  PTT:36.5 sec      Vitals:  Vital Signs Last 24 Hrs  T(C): 37.5 (19 Feb 2018 04:28), Max: 37.5 (19 Feb 2018 04:28)  T(F): 99.5 (19 Feb 2018 04:28), Max: 99.5 (19 Feb 2018 04:28)  HR: 63 (19 Feb 2018 04:28) (63 - 67)  BP: 132/72 (19 Feb 2018 04:28) (110/64 - 132/72)  BP(mean): --  RR: 18 (19 Feb 2018 04:28) (18 - 18)  SpO2: 93% (19 Feb 2018 04:28) (93% - 98%)      NEUROLOGICAL EXAM:    Mental status: Awake, alert, and in no apparent distress. Oriented to self, hospital, 2018, irritable  Cranial Nerves: Pupils were equal, round, reactive to light. Extraocular movements were intact. Fundoscopic exam was deferred. Facial sensation was intact to light touch. There was no facial asymmetry. The palate was upgoing symmetrically and tongue was midline. Hearing acuity was intact to finger rub AU. Shoulder shrug was full bilaterally  Motor exam: Bulk and tone were normal. moving all extremities antigravity with resistance, grossly strong with limited cooperation  Reflexes: 2 in the bilateral upper extremities. 2 in the bilateral lower extremities. Toes were downgoing bilaterally.   Sensation: Intact to light touch, temperature.   Coordination: no gross dysmetria  Gait: deferred    < from: CT Head No Cont (02.04.18 @ 00:21) >    EXAM:  CT BRAIN                            PROCEDURE DATE:  02/04/2018      INTERPRETATION:  CLINICAL INFORMATION: Head trauma. Left leg weakness.    TECHNIQUE: Noncontrast axial CT images were acquired through the head.   Two-dimensional sagittal and coronal reformats were generated.    COMPARISON STUDY: CT head dated April 3, 2017    FINDINGS:     There is no CT evidence of acute intracranial hemorrhage or extra-axial   collection.    There is no CT evidence of an acute demarcated territorial infarct.    There are confluent periventricular white matter hypodensities compatible   with chronic microvascular-type change.     There is no vasogenic edema or mass effect.    The ventricles and sulci are normal in size and configuration forthe   patient's age.    The basal cisterns are patent.    There is mucosal disease in the left maxillary sinus. The mastoid air   cells and middle ear cavities are grossly clear.    The soft tissues of the scalp and face are unremarkable.    The bones of the calvarium, skull base, and face are unremarkable.    IMPRESSION:     No CT evidence of acute intracranial hemorrhage or mass effect.    No CT evidence of acute demarcated territorial infarct.     If the patient's symptoms persist, consider short interval follow-up head   CT or brain MRI if there are no MRI contraindications.    ALIRIO LINDSEY M.D., RADIOLOGY RESIDENT  This document has been electronically signed.  DOE LR M.D., ATTENDING RADIOLOGIST  This document has been electronically signed. Feb 4 2018 12:57AM      < from: MR Cervical Spine No Cont (02.14.18 @ 22:19) >  FINDINGS:  Vertebral bodies and Discs: No evidence of osseous metastatic disease at   the cervical spine level .  Alignment:  No subluxations.  C2-C3 level: Asymmetric bulge to the right indents the ventral thecal sac   and causes mild mass impression on the cord  C3-C4 level: Central broad-based disc protrusion with mass effect on the   ventral thecal sac without cord compression. Bilateral neural foraminal   narrowing on the basis of probable uncovertebral joint and facet joint   hypertrophic change .  C4-C5 level: Left greater than right uncovertebral joint degenerative  change narrows the neural foramina bilaterally  C5-C6 level: Bilateral neural foraminal narrowing bilaterally on the   basis of uncovertebral joint degenerative change.  C6-C7 level:  Normal.  C7-T1 level:  Normal.  Spinal cord:  Normal.  Spinal canal:  No other intradural or extradural defects are seen.  Miscellaneous:  None.      IMPRESSION: Degenerative changes with neural foraminal narrowing 3/4, 4/5   and 5/6. No cord compression    Lumbar spine:    FINDINGS:    VERTEBRAL BODIES AND DISCS: Normal.  ALIGNMENT:  No subluxations.  T12-L1: The conus is seen at this level. No canal compromise or foraminal   compromise  L1-L2 LEVEL: Symmetric bulge with triangularization of the thecal sac   with mild stenosis  L2-L3 LEVEL: Symmetric bulge with mass impression on the ventral thecal   sac and mild to moderate central stenosis  L3-L4 LEVEL: Bulge with mass impression on the ventral thecal sac and   mild to moderate central stenosis  L4-L5 LEVEL: Symmetric disc bulge with bilateral facet and ligamentous   hypertrophy with a moderate degree of central stenosis  L5-S1 LEVEL: Bulge ligamentous hypertrophy and facet joint hypertrophic   change contributes to a moderate to severe degree of central stenosis or   disc material in the lateral compartments bilaterally appears to impress   upon the exiting L5 nerve roots bilaterally  SPINAL CANAL:  No other intradural or extradural defects are seen.   CONUS MEDULLARIS:  Normal.  MISCELLANEOUS: Large left renal cyst     IMPRESSION: Multilevel degenerative changes. No evidence of osseous   metastatic disease. Most significant level of stenosis appears to be 5/ 1   in the lumbar spine region. Disc material is seen in the neural foramina   bilaterally at 51 appearing to encroach upon the exiting nerve roots   bilaterally    < end of copied text >

## 2018-02-19 NOTE — PROGRESS NOTE ADULT - SUBJECTIVE AND OBJECTIVE BOX
pt seen at bedside in NAD.  Left heel noted small hemorraghic bullae noted stable intact  minimal tender with palpation  recommend Cavilon daily to heel and continue to offload heel  will follow weekly if heel worsens please call [phone] ASAP

## 2018-02-20 ENCOUNTER — TRANSCRIPTION ENCOUNTER (OUTPATIENT)
Age: 83
End: 2018-02-20

## 2018-02-20 VITALS — WEIGHT: 178.13 LBS

## 2018-02-20 LAB
ANION GAP SERPL CALC-SCNC: 8 MMOL/L — SIGNIFICANT CHANGE UP (ref 5–17)
BUN SERPL-MCNC: 23 MG/DL — SIGNIFICANT CHANGE UP (ref 7–23)
CALCIUM SERPL-MCNC: 8.8 MG/DL — SIGNIFICANT CHANGE UP (ref 8.4–10.5)
CHLORIDE SERPL-SCNC: 101 MMOL/L — SIGNIFICANT CHANGE UP (ref 96–108)
CO2 SERPL-SCNC: 29 MMOL/L — SIGNIFICANT CHANGE UP (ref 22–31)
CREAT SERPL-MCNC: 0.74 MG/DL — SIGNIFICANT CHANGE UP (ref 0.5–1.3)
DSDNA AB SER-ACNC: 12 IU/ML — SIGNIFICANT CHANGE UP
GLUCOSE BLDC GLUCOMTR-MCNC: 155 MG/DL — HIGH (ref 70–99)
GLUCOSE BLDC GLUCOMTR-MCNC: 257 MG/DL — HIGH (ref 70–99)
GLUCOSE SERPL-MCNC: 185 MG/DL — HIGH (ref 70–99)
HCT VFR BLD CALC: 35.4 % — SIGNIFICANT CHANGE UP (ref 34.5–45)
HGB BLD-MCNC: 11.1 G/DL — LOW (ref 11.5–15.5)
INR BLD: 2.91 RATIO — HIGH (ref 0.88–1.16)
MCHC RBC-ENTMCNC: 28.9 PG — SIGNIFICANT CHANGE UP (ref 27–34)
MCHC RBC-ENTMCNC: 31.4 GM/DL — LOW (ref 32–36)
MCV RBC AUTO: 92.2 FL — SIGNIFICANT CHANGE UP (ref 80–100)
PLATELET # BLD AUTO: 454 K/UL — HIGH (ref 150–400)
POTASSIUM SERPL-MCNC: 4.6 MMOL/L — SIGNIFICANT CHANGE UP (ref 3.5–5.3)
POTASSIUM SERPL-SCNC: 4.6 MMOL/L — SIGNIFICANT CHANGE UP (ref 3.5–5.3)
PROTHROM AB SERPL-ACNC: 33.6 SEC — HIGH (ref 10–13.1)
RBC # BLD: 3.84 M/UL — SIGNIFICANT CHANGE UP (ref 3.8–5.2)
RBC # FLD: 14.8 % — HIGH (ref 10.3–14.5)
SODIUM SERPL-SCNC: 138 MMOL/L — SIGNIFICANT CHANGE UP (ref 135–145)
WBC # BLD: 6.95 K/UL — SIGNIFICANT CHANGE UP (ref 3.8–10.5)
WBC # FLD AUTO: 6.95 K/UL — SIGNIFICANT CHANGE UP (ref 3.8–10.5)

## 2018-02-20 PROCEDURE — 83735 ASSAY OF MAGNESIUM: CPT

## 2018-02-20 PROCEDURE — 86431 RHEUMATOID FACTOR QUANT: CPT

## 2018-02-20 PROCEDURE — 97110 THERAPEUTIC EXERCISES: CPT

## 2018-02-20 PROCEDURE — 99232 SBSQ HOSP IP/OBS MODERATE 35: CPT | Mod: GC

## 2018-02-20 PROCEDURE — 74177 CT ABD & PELVIS W/CONTRAST: CPT

## 2018-02-20 PROCEDURE — 84132 ASSAY OF SERUM POTASSIUM: CPT

## 2018-02-20 PROCEDURE — 84165 PROTEIN E-PHORESIS SERUM: CPT

## 2018-02-20 PROCEDURE — 82330 ASSAY OF CALCIUM: CPT

## 2018-02-20 PROCEDURE — 85652 RBC SED RATE AUTOMATED: CPT

## 2018-02-20 PROCEDURE — 94640 AIRWAY INHALATION TREATMENT: CPT

## 2018-02-20 PROCEDURE — 82435 ASSAY OF BLOOD CHLORIDE: CPT

## 2018-02-20 PROCEDURE — 83036 HEMOGLOBIN GLYCOSYLATED A1C: CPT

## 2018-02-20 PROCEDURE — 80048 BASIC METABOLIC PNL TOTAL CA: CPT

## 2018-02-20 PROCEDURE — 70450 CT HEAD/BRAIN W/O DYE: CPT

## 2018-02-20 PROCEDURE — 84155 ASSAY OF PROTEIN SERUM: CPT

## 2018-02-20 PROCEDURE — 93306 TTE W/DOPPLER COMPLETE: CPT

## 2018-02-20 PROCEDURE — 86200 CCP ANTIBODY: CPT

## 2018-02-20 PROCEDURE — 82553 CREATINE MB FRACTION: CPT

## 2018-02-20 PROCEDURE — 85730 THROMBOPLASTIN TIME PARTIAL: CPT

## 2018-02-20 PROCEDURE — 84156 ASSAY OF PROTEIN URINE: CPT

## 2018-02-20 PROCEDURE — 83605 ASSAY OF LACTIC ACID: CPT

## 2018-02-20 PROCEDURE — 87086 URINE CULTURE/COLONY COUNT: CPT

## 2018-02-20 PROCEDURE — 97530 THERAPEUTIC ACTIVITIES: CPT

## 2018-02-20 PROCEDURE — 76775 US EXAM ABDO BACK WALL LIM: CPT

## 2018-02-20 PROCEDURE — 87486 CHLMYD PNEUM DNA AMP PROBE: CPT

## 2018-02-20 PROCEDURE — 71045 X-RAY EXAM CHEST 1 VIEW: CPT

## 2018-02-20 PROCEDURE — 87040 BLOOD CULTURE FOR BACTERIA: CPT

## 2018-02-20 PROCEDURE — 93005 ELECTROCARDIOGRAM TRACING: CPT

## 2018-02-20 PROCEDURE — 85014 HEMATOCRIT: CPT

## 2018-02-20 PROCEDURE — 82550 ASSAY OF CK (CPK): CPT

## 2018-02-20 PROCEDURE — 82947 ASSAY GLUCOSE BLOOD QUANT: CPT

## 2018-02-20 PROCEDURE — 86038 ANTINUCLEAR ANTIBODIES: CPT

## 2018-02-20 PROCEDURE — 72125 CT NECK SPINE W/O DYE: CPT

## 2018-02-20 PROCEDURE — 86140 C-REACTIVE PROTEIN: CPT

## 2018-02-20 PROCEDURE — 81001 URINALYSIS AUTO W/SCOPE: CPT

## 2018-02-20 PROCEDURE — 87633 RESP VIRUS 12-25 TARGETS: CPT

## 2018-02-20 PROCEDURE — 86334 IMMUNOFIX E-PHORESIS SERUM: CPT

## 2018-02-20 PROCEDURE — 84484 ASSAY OF TROPONIN QUANT: CPT

## 2018-02-20 PROCEDURE — 97162 PT EVAL MOD COMPLEX 30 MIN: CPT

## 2018-02-20 PROCEDURE — 85027 COMPLETE CBC AUTOMATED: CPT

## 2018-02-20 PROCEDURE — 72157 MRI CHEST SPINE W/O & W/DYE: CPT

## 2018-02-20 PROCEDURE — 87798 DETECT AGENT NOS DNA AMP: CPT

## 2018-02-20 PROCEDURE — 80053 COMPREHEN METABOLIC PANEL: CPT

## 2018-02-20 PROCEDURE — 85610 PROTHROMBIN TIME: CPT

## 2018-02-20 PROCEDURE — 84295 ASSAY OF SERUM SODIUM: CPT

## 2018-02-20 PROCEDURE — 87581 M.PNEUMON DNA AMP PROBE: CPT

## 2018-02-20 PROCEDURE — 97116 GAIT TRAINING THERAPY: CPT

## 2018-02-20 PROCEDURE — 72141 MRI NECK SPINE W/O DYE: CPT

## 2018-02-20 PROCEDURE — 87186 SC STD MICRODIL/AGAR DIL: CPT

## 2018-02-20 PROCEDURE — 36600 WITHDRAWAL OF ARTERIAL BLOOD: CPT

## 2018-02-20 PROCEDURE — 95819 EEG AWAKE AND ASLEEP: CPT

## 2018-02-20 PROCEDURE — 86225 DNA ANTIBODY NATIVE: CPT

## 2018-02-20 PROCEDURE — 82962 GLUCOSE BLOOD TEST: CPT

## 2018-02-20 PROCEDURE — 86235 NUCLEAR ANTIGEN ANTIBODY: CPT

## 2018-02-20 PROCEDURE — 84443 ASSAY THYROID STIM HORMONE: CPT

## 2018-02-20 PROCEDURE — 84100 ASSAY OF PHOSPHORUS: CPT

## 2018-02-20 PROCEDURE — 99221 1ST HOSP IP/OBS SF/LOW 40: CPT

## 2018-02-20 PROCEDURE — 99285 EMERGENCY DEPT VISIT HI MDM: CPT | Mod: 25

## 2018-02-20 PROCEDURE — 82803 BLOOD GASES ANY COMBINATION: CPT

## 2018-02-20 PROCEDURE — 72148 MRI LUMBAR SPINE W/O DYE: CPT

## 2018-02-20 PROCEDURE — 71260 CT THORAX DX C+: CPT

## 2018-02-20 RX ORDER — CHOLECALCIFEROL (VITAMIN D3) 125 MCG
1000 CAPSULE ORAL
Qty: 30000 | Refills: 0 | OUTPATIENT
Start: 2018-02-20 | End: 2018-03-21

## 2018-02-20 RX ORDER — LIDOCAINE 4 G/100G
0 CREAM TOPICAL
Qty: 0 | Refills: 0 | COMMUNITY
Start: 2018-02-20

## 2018-02-20 RX ORDER — ACETAMINOPHEN 500 MG
2 TABLET ORAL
Qty: 0 | Refills: 0 | COMMUNITY
Start: 2018-02-20

## 2018-02-20 RX ORDER — CHOLECALCIFEROL (VITAMIN D3) 125 MCG
0 CAPSULE ORAL
Qty: 0 | Refills: 0 | COMMUNITY

## 2018-02-20 RX ORDER — ENOXAPARIN SODIUM 100 MG/ML
0.8 INJECTION SUBCUTANEOUS
Qty: 0 | Refills: 0 | COMMUNITY

## 2018-02-20 RX ORDER — WARFARIN SODIUM 2.5 MG/1
1 TABLET ORAL
Qty: 14 | Refills: 0 | OUTPATIENT
Start: 2018-02-20 | End: 2018-03-05

## 2018-02-20 RX ORDER — WARFARIN SODIUM 2.5 MG/1
1 TABLET ORAL
Qty: 10 | Refills: 0 | OUTPATIENT

## 2018-02-20 RX ADMIN — Medication 5 MILLIGRAM(S): at 06:46

## 2018-02-20 RX ADMIN — Medication 6: at 12:10

## 2018-02-20 RX ADMIN — LIDOCAINE 1 PATCH: 4 CREAM TOPICAL at 11:34

## 2018-02-20 RX ADMIN — UMECLIDINIUM BROMIDE AND VILANTEROL TRIFENATATE 1 PUFF(S): 62.5; 25 POWDER RESPIRATORY (INHALATION) at 08:39

## 2018-02-20 RX ADMIN — Medication 2: at 08:39

## 2018-02-20 RX ADMIN — Medication 1000 UNIT(S): at 11:34

## 2018-02-20 NOTE — PROGRESS NOTE ADULT - SUBJECTIVE AND OBJECTIVE BOX
MARU CHARLEEN  [id number]    INTERVAL HPI/OVERNIGHT EVENTS:  Pt reports no improvement from Prednisone. Still has pain in her neck- says she knows she will get better when she is d/c    MEDICATIONS  (STANDING):  cholecalciferol 1000 Unit(s) Oral daily  dextrose 5%. 1000 milliLiter(s) (50 mL/Hr) IV Continuous <Continuous>  insulin lispro (HumaLOG) corrective regimen sliding scale   SubCutaneous three times a day before meals  insulin lispro (HumaLOG) corrective regimen sliding scale   SubCutaneous at bedtime  lidocaine   Patch 1 Patch Transdermal daily  sodium chloride 0.9%. 1000 milliLiter(s) (50 mL/Hr) IV Continuous <Continuous>  umeclidinium 62.5 MICROgram(s)/vilanterol 25 MICROgram(s) Inhaler 1 Puff(s) Inhalation daily    MEDICATIONS  (PRN):  acetaminophen   Tablet 650 milliGRAM(s) Oral every 6 hours PRN For Temp greater than 38 C (100.4 F)  ibuprofen  Tablet 400 milliGRAM(s) Oral every 8 hours PRN pain      Allergies    codeine (Fever; Rash)  contrast media (iodine-based) (Other)    Intolerances        Review of Systems:   General: No fevers/chills, no fatigue  CVS: No CP/palpitations  Resp: No SOB/wheezing  GI: No N/V/C/D/abdominal pain  MSK: +neck pain  Skin: No new rashes  Neuro: No headaches      Vital Signs Last 24 Hrs  T(C): 36.4 (20 Feb 2018 11:21), Max: 36.8 (20 Feb 2018 04:25)  T(F): 97.6 (20 Feb 2018 11:21), Max: 98.3 (20 Feb 2018 10:30)  HR: 66 (20 Feb 2018 11:21) (56 - 76)  BP: 126/68 (20 Feb 2018 11:21) (106/61 - 126/68)  BP(mean): --  RR: 18 (20 Feb 2018 11:21) (18 - 18)  SpO2: 96% (20 Feb 2018 11:21) (94% - 100%)    Physical Exam:  General: NAD  Cardio: +S1/S2, RRR  Resp: CTA b/l  GI: +BS, soft, NT/ND  MSK: +neck pain  Neuro: AAOx3      LABS:                        11.1   6.95  )-----------( 454      ( 20 Feb 2018 07:23 )             35.4     02-20    138  |  101  |  23  ----------------------------<  185<H>  4.6   |  29  |  0.74    Ca    8.8      20 Feb 2018 07:29      PT/INR - ( 20 Feb 2018 07:24 )   PT: 33.6 sec;   INR: 2.91 ratio         PTT - ( 19 Feb 2018 06:39 )  PTT:36.5 sec        RADIOLOGY & ADDITIONAL TESTS:

## 2018-02-20 NOTE — PROGRESS NOTE ADULT - PROBLEM SELECTOR PROBLEM 1
Seizure
Fever
Seizure
Seizure
Fever
Fever
Seizure
Fever
Seizure
Fever

## 2018-02-20 NOTE — DISCHARGE NOTE ADULT - HOSPITAL COURSE
. 83 y/o Female with a history of lung CA with past LEFT lower lobectomy for lung CA presumed to be in remission, COPD, DM2, recently diagnosed with LLE DVT, admitted with fever of uncertain etiology  Uti abx   chf iv lasix   Discussed DCP and med rec with Dr Morocho    PT Med stable for MANAN   Follow up with PMD 81 y/o Female with a history of lung CA with past LEFT lower lobectomy for lung CA presumed to be in remission, COPD, DM2, recently diagnosed with LLE DVT, admitted with fever of uncertain etiology  Sepsis UTI completed course abx   Discussed DCP and med rec with Dr Morocho    PT Med stable for MANAN   Follow up with PMD 81 y/o Female with a history of lung CA with past LEFT lower lobectomy for lung CA presumed to be in remission, COPD, DM2, recently diagnosed with LLE DVT, admitted with SIRS present on admission, later found to be 2' UTI and completed course of antibiotics for this in hospital.  Also incidental finding of proctitis on CT without symptoms, for which she also compelted course of cipro/flagyl in the hospital.  Discussed DCP and med rec with Dr Morocho    PT Med stable for MANAN   Follow up with PMD

## 2018-02-20 NOTE — PROGRESS NOTE ADULT - PROBLEM SELECTOR PLAN 1
finished cipro/flagyl x 7 days for asymptomatic proctitis  CURTIS, anti-CCP are negative  SPEP/UPEP/immunofixation c/w acute inflammation  prednisone 5 mg bid x 2 weeks  outpt rheum followup

## 2018-02-20 NOTE — PROGRESS NOTE ADULT - PROBLEM SELECTOR PROBLEM 3
DVT (deep venous thrombosis)
Type 2 diabetes mellitus
DVT (deep venous thrombosis)
Type 2 diabetes mellitus
DVT (deep venous thrombosis)
Type 2 diabetes mellitus
DVT (deep venous thrombosis)

## 2018-02-20 NOTE — PROGRESS NOTE ADULT - PROVIDER SPECIALTY LIST ADULT
Infectious Disease
Internal Medicine
Neurology
Podiatry
Rheumatology
Rheumatology
Wound Care
Neurology
Neurology
Internal Medicine
Internal Medicine
Infectious Disease
Internal Medicine

## 2018-02-20 NOTE — PROGRESS NOTE ADULT - PROBLEM SELECTOR PROBLEM 5
Syncope, unspecified syncope type
Proctitis
Syncope, unspecified syncope type
Urine retention
Syncope, unspecified syncope type
Syncope, unspecified syncope type

## 2018-02-20 NOTE — CONSULT NOTE ADULT - SUBJECTIVE AND OBJECTIVE BOX
PAGER:  [phone]               [facility] [id number]              EMAIL [email or web address]   OFFICE [phone]                              ********VASCULAR MEDICINE & CARDIOLOGY CONSULT NOTE********                            CC:  DVT    INTERVAL HISTORY:  Patient has been hospitalized since begining of February.  she had a prior admission during which she was diagnosed with a DVT and was transitioned to coumadin with a heparin bridge.  She is currently on coumadin with a goal INR 2-3.  Sitting in chair without any complaints.  Wants to be discharged.  No CP or SOB.  Recent cross sectional imaging and tumor markers negative for malignancy.        HISTORY OF PRESENT ILLNESS:  HPI:   83 y/o F--patient seen with adult daughter and son, son in law in attendance--patient with a history of lung CA with past LEFT lower lobectomy for lung CA presumed to be in remission, COPD, malignant melanoma, breast CA with past tylectomy presumed to be disease free, type 2 DM but not on current treatment, past RIGHT DVT on Coumadin with recent discharge from [facility] in January 2018 for a LEFT leg DVT with patient resumed on Coumadin per patient's preference, with patient apparently initially declining rehab referral from last admission, with patient brought in by daughter following patient calling for help following daughter noted patient to be in a plank position at home after patient was attempting to break a fall.  Patient with fever, chills, rigors at home.  Patient with increased urination.  No HA, no focal weakness.  No LOC or head trauma.  Daughter was concerned if patient had an ictal episode with patient with (?) trismus.  No faecal or urinary incontinence.  No back pain, no tearing back pain.  No hip or extremity pain.  No abdominal pain, no red blood per rectum or melena.  Remaining review of systems not contributory. (04 Feb 2018 03:03)          Allergies    codeine (Fever; Rash)  contrast media (iodine-based) (Other)    Intolerances    	    MEDICATIONS:      umeclidinium 62.5 MICROgram(s)/vilanterol 25 MICROgram(s) Inhaler 1 Puff(s) Inhalation daily    acetaminophen   Tablet 650 milliGRAM(s) Oral every 6 hours PRN  ibuprofen  Tablet 400 milliGRAM(s) Oral every 8 hours PRN      insulin lispro (HumaLOG) corrective regimen sliding scale   SubCutaneous three times a day before meals  insulin lispro (HumaLOG) corrective regimen sliding scale   SubCutaneous at bedtime  predniSONE   Tablet 5 milliGRAM(s) Oral two times a day    cholecalciferol 1000 Unit(s) Oral daily  dextrose 5%. 1000 milliLiter(s) IV Continuous <Continuous>  lidocaine   Patch 1 Patch Transdermal daily  sodium chloride 0.9%. 1000 milliLiter(s) IV Continuous <Continuous>      PAST MEDICAL & SURGICAL HISTORY:  Breast cancer  Lung cancer, left  Diabetes mellitus: diet controlled diabetes  Afib  S/P colectomy  Status post left hip replacement  S/P lobectomy of lung: s/p LLL Lobectomy 6/2014 for lung CA      FAMILY HISTORY:  Family history of lung cancer (Sibling)  Family history of secondary cancer of bone and bone marrow  Family history of dementia      SOCIAL HISTORY:  unchanged    REVIEW OF SYSTEMS:  CONSTITUTIONAL: No fever, weight loss, or fatigue  EYES: No eye pain, visual disturbances, or discharge  ENMT:  No difficulty hearing, tinnitus, vertigo; No sinus or throat pain  NECK: No pain or stiffness  RESPIRATORY: No cough, wheezing, chills or hemoptysis; No Shortness of Breath  CARDIOVASCULAR: No chest pain, palpitations, passing out, dizziness, or leg swelling  GASTROINTESTINAL: No abdominal or epigastric pain. No nausea, vomiting, or hematemesis; No diarrhea or constipation. No melena or hematochezia.  GENITOURINARY: No dysuria, frequency, hematuria, or incontinence  NEUROLOGICAL: No headaches, memory loss, loss of strength, numbness, or tremors  SKIN: No itching, burning, rashes, or lesions   LYMPH Nodes: No enlarged glands  ENDOCRINE: No heat or cold intolerance; No hair loss  MUSCULOSKELETAL: No joint pain or swelling; No muscle, back, or extremity pain  PSYCHIATRIC: No depression, anxiety, mood swings, or difficulty sleeping  HEME/LYMPH: No easy bruising, or bleeding gums  ALLERY AND IMMUNOLOGIC: No hives or eczema	    [x ] All others negative	  [ ] Unable to obtain    PHYSICAL EXAM:  T(C): 36.4 (02-20-18 @ 11:21), Max: 36.8 (02-20-18 @ 04:25)  HR: 66 (02-20-18 @ 11:21) (56 - 76)  BP: 126/68 (02-20-18 @ 11:21) (106/61 - 126/68)  RR: 18 (02-20-18 @ 11:21) (18 - 18)  SpO2: 96% (02-20-18 @ 11:21) (94% - 100%)  Wt(kg): --  I&O's Summary    19 Feb 2018 07:01  -  20 Feb 2018 07:00  --------------------------------------------------------  IN: 870 mL / OUT: 3850 mL / NET: -2980 mL        Appearance: Normal	  HEENT:   Normal oral mucosa, PERRL, EOMI	  Lymphatic: No lymphadenopathy  Cardiovascular: Normal S1 S2, No JVD, No murmurs, No edema  Respiratory: Lungs clear to auscultation	  Psychiatry: A & O x 3, Mood & affect appropriate  Gastrointestinal:  Soft, Non-tender, + BS	  Skin: No rashes, No ecchymoses, No cyanosis	  Neurologic: Non-focal  Extremities: Normal range of motion, No clubbing, cyanosis or edema  Vascular: bilateral edema.  non-tender    LABS:	 	    CBC Full  -  ( 20 Feb 2018 07:23 )  WBC Count : 6.95 K/uL  Hemoglobin : 11.1 g/dL  Hematocrit : 35.4 %  Platelet Count - Automated : 454 K/uL  Mean Cell Volume : 92.2 fl  Mean Cell Hemoglobin : 28.9 pg  Mean Cell Hemoglobin Concentration : 31.4 gm/dL  Auto Neutrophil # : x  Auto Lymphocyte # : x  Auto Monocyte # : x  Auto Eosinophil # : x  Auto Basophil # : x  Auto Neutrophil % : x  Auto Lymphocyte % : x  Auto Monocyte % : x  Auto Eosinophil % : x  Auto Basophil % : x    02-20    138  |  101  |  23  ----------------------------<  185<H>  4.6   |  29  |  0.74  02-19    138  |  102  |  26<H>  ----------------------------<  203<H>  4.5   |  27  |  0.77    Ca    8.8      20 Feb 2018 07:29  Ca    9.3      19 Feb 2018 07:41

## 2018-02-20 NOTE — PROGRESS NOTE ADULT - PROBLEM SELECTOR PROBLEM 8
Hypophosphatemia

## 2018-02-20 NOTE — DISCHARGE NOTE ADULT - PLAN OF CARE
Sepsis uti completed course abx Stable cleared for dc HgA1C this admission.  Make sure you get your HgA1c checked every three months.  If you take oral diabetes medications, check your blood glucose two times a day.  If you take insulin, check your blood glucose before meals and at bedtime.  It's important not to skip any meals.  Keep a log of your blood glucose results and always take it with you to your doctor appointments.  Keep a list of your current medications including injectables and over the counter medications and bring this medication list with you to all your doctor appointments.  If you have not seen your opthalmologist this year call for appointment.  Check your feet daily for redness, sores, or openings. Do not self treat. If no improvement in two days call your primary care physician for an appointment.  Low blood sugar (hypoglycemia) is a blood sugar below 70mg/dl. Check your blood sugar if you feel signs/symptoms of hypoglycemia. If your blood sugar is below 70 take 15 grams of carbohydrates (ex 4 oz of apple juice, 3-4 glucosr tablets, or 4-6 oz of regular soda) wait 15 minutes and repeat blood sugar to make sure it comes up above 70.  If your blood sugar is above 70 and you are due for a meal, have a meal.  If you are not due for a meal have a snack.  This snack helps keeps your blood sugar at a safe range. You had a bladder &/or kidney infection  Call your doctor with pain or burning with urination, frequent urination, feeling to urinate suddenly, blood in urine, fever, back pain, nausea or vomiting  You need to take and finish your antibiotic as prescribed so that the infection does not reoccur  Drink adequate fluids - there is no harm to try cranberry juice  Females need to urinate right after sex Atrial fibrillation is the most common heart rhythm problem & has the risk of stroke & heart attack  It helps if you control your blood pressure, not drink more than 1-2 alcohol drinks per day, cut down on caffeine, getting treatment for over active thyroid gland, & getting exercise  Call your doctor if you feel your heart racing or beating unusually, chest tightness or pain, lightheaded, faint, shortness of breath especially with exercise  It is important to take your heart medication as prescribed  You may be on anticoagulation which is very important to take as directed - you may need blood work to monitor drug levels

## 2018-02-20 NOTE — DISCHARGE NOTE ADULT - CARE PLAN
Principal Discharge DX:	Fever  Goal:	Sepsis uti completed course abx  Assessment and plan of treatment:	Stable cleared for dc  Secondary Diagnosis:	Type 2 diabetes mellitus  Assessment and plan of treatment:	HgA1C this admission.  Make sure you get your HgA1c checked every three months.  If you take oral diabetes medications, check your blood glucose two times a day.  If you take insulin, check your blood glucose before meals and at bedtime.  It's important not to skip any meals.  Keep a log of your blood glucose results and always take it with you to your doctor appointments.  Keep a list of your current medications including injectables and over the counter medications and bring this medication list with you to all your doctor appointments.  If you have not seen your opthalmologist this year call for appointment.  Check your feet daily for redness, sores, or openings. Do not self treat. If no improvement in two days call your primary care physician for an appointment.  Low blood sugar (hypoglycemia) is a blood sugar below 70mg/dl. Check your blood sugar if you feel signs/symptoms of hypoglycemia. If your blood sugar is below 70 take 15 grams of carbohydrates (ex 4 oz of apple juice, 3-4 glucosr tablets, or 4-6 oz of regular soda) wait 15 minutes and repeat blood sugar to make sure it comes up above 70.  If your blood sugar is above 70 and you are due for a meal, have a meal.  If you are not due for a meal have a snack.  This snack helps keeps your blood sugar at a safe range.  Secondary Diagnosis:	Urinary tract infection without hematuria, site unspecified  Assessment and plan of treatment:	You had a bladder &/or kidney infection  Call your doctor with pain or burning with urination, frequent urination, feeling to urinate suddenly, blood in urine, fever, back pain, nausea or vomiting  You need to take and finish your antibiotic as prescribed so that the infection does not reoccur  Drink adequate fluids - there is no harm to try cranberry juice  Females need to urinate right after sex  Secondary Diagnosis:	Afib  Assessment and plan of treatment:	Atrial fibrillation is the most common heart rhythm problem & has the risk of stroke & heart attack  It helps if you control your blood pressure, not drink more than 1-2 alcohol drinks per day, cut down on caffeine, getting treatment for over active thyroid gland, & getting exercise  Call your doctor if you feel your heart racing or beating unusually, chest tightness or pain, lightheaded, faint, shortness of breath especially with exercise  It is important to take your heart medication as prescribed  You may be on anticoagulation which is very important to take as directed - you may need blood work to monitor drug levels

## 2018-02-20 NOTE — PROGRESS NOTE ADULT - PROBLEM SELECTOR PROBLEM 4
Syncope, unspecified syncope type
Type 2 diabetes mellitus
Neck pain
Syncope, unspecified syncope type
Type 2 diabetes mellitus

## 2018-02-20 NOTE — PROGRESS NOTE ADULT - ASSESSMENT
83 y/o Female with a history of lung CA with past LEFT lower lobectomy for lung CA presumed to be in remission, COPD, DM2, recently diagnosed with LLE DVT, admitted with fever of uncertain etiology
82F with h/o lung ca s/p lobectomy, melanoma s/p resection on right arm and lymph removal on that side, recent DVT on AC  presents fever and chills s/p fall
82F with h/o lung ca s/p lobectomy, melanoma s/p resection on right arm and lymph removal on that side, recent DVT on AC  presents fever and chills s/p fall  hospital course complicated by persistent fevers without focal source  urinary retention now with mcadams   neck pain   ct scan abd with proctitis although no symptoms-- pt getting cipro flagyl  Now afebrile  Quesiton whether in the setting of urinaryu retention/mcadams placement patient's bacteruria could have resulted in fever.  No clear/convincion evidence of infection on exam
82F with h/o lung ca s/p lobectomy, melanoma s/p resection on right arm and lymph removal on that side, recent DVT on AC  presents fever and chills s/p fall  hospital course complicated by persistent fevers without focal source  urinary retention now with mcadasm   neck pain   ct scan abd with proctitis although no symptoms-- pt getting cipro flagyl  afebrile > 24 hours
a/p - 83 yo F with lung, breast CA and melonoma who had LOC in 2017 that workup done in my office was nL.    In setting of fevers found in ER, at home had witness LOC.  She is now back to baseline. Exam nonfocal limited historian.    The issue is question of sz, the lack of postictal state, b/b incontinence or tongue biting make seizure less likely.    Even if did, was in the setting of fever, would be characterized as provoked and does in itself not require treatment.    CT head negative  EEG normal  Exam stable  Has been febrile in last 24 hours  Recent DVT on AC    Plan:  Continue supportive care per medicine  ID following as recent fever  Complains of neck and shoulder pain, CT C spine unremarkable- follow up official report  Rheum consult  On AC for DVT  Frequent reorientation  will follow, no further inpt neuro workup  D/w patient.
a/p - 83 yo F with lung, breast CA and melonoma who had LOC in 2017 that workup done in my office was nL.  In setting of fevers found in ER, at home had witness LOC.  She is now back to baseline. Exam nonfocal except ppor historian.  The issue is question of sz, the lack of postictal state, b/b incontinece or tongue biting make seizure less likely.  Even if did, was in the setting of fever, would be characterized as provoked and does in itself not require treatment.  CT head negative  d/w NP routine EEG is sufficient, unlikely to change managment  Appears eeg was done, if unremarkable, then no neuro c/i to d/c  will follow   d/w pt
81 y/o Female with a history of lung CA with past LEFT lower lobectomy for lung CA presumed to be in remission, COPD, DM2, recently diagnosed with LLE DVT, admitted with fever of uncertain etiology
82F with h/o lung ca s/p lobectomy, melanoma s/p resection on right arm and lymph removal on that side, recent DVT on AC  presents fever and chills s/p fall  hospital course complicated by persistent fevers without focal source  urinary retention now with mcadams
82F with h/o lung ca s/p lobectomy, melanoma s/p resection on right arm and lymph removal on that side, recent DVT on AC  presents fever and chills s/p fall  hospital course complicated by persistent fevers without focal source  urinary retention now with mcadams
82F with h/o lung ca s/p lobectomy, melanoma s/p resection on right arm and lymph removal on that side, recent DVT on AC  presents fever and chills s/p fall  hospital course complicated by persistent fevers without focal source  urinary retention now with mcadams   neck pain   ct scan abd with proctitis although no symptoms-- pt getting cipro flagyl
82F with h/o lung ca s/p lobectomy, melanoma s/p resection on right arm and lymph removal on that side, recent DVT on AC  presents fever and chills s/p fall  hospital course complicated by persistent fevers without focal source  urinary retention now with mcadams   neck pain   ct scan abd with proctitis although no symptoms-- pt getting cipro flagyl  Now afebrile  Quesiton whether in the setting of urinaryu retention/mcadams placement patient's bacteruria could have resulted in fever.  No clear/convincion evidence of infection on exam
82yoF hx of lung ca s/p lobectomy, COPD, melanoma, breast ca, R sided DVT, recently diagnosed L leg DVT on Coumadin now p/w fever and syncope at home now reporting diffuse body pains     In setting of relatively acute onset of neck pain and stiffness with diffuse pains, can consider polymyalgia rheumatica   CCP negative, CURTIS negative    - Would c/w Prednisone 5mg BID with PT/OT  - Would rec trial of Prednisone 5mg BID for now to assess for improvement  - PT/OT and oob
82yoF hx of lung ca s/p lobectomy, COPD, melanoma, breast ca, R sided DVT, recently diagnosed L leg DVT on Coumadin now p/w fever and syncope at home now reporting diffuse body pains     In setting of relatively acute onset of neck pain and stiffness with diffuse pains, can consider polymyalgia rheumatica   CCP negative, CURTIS negative  Pt has not had relief from Prednisone, making diagnosis of PMR less likely.  Getting d/c today to rehab- would encourage continued PT/OT    Please recall if needed
83 y/o Female with a history of lung CA with past LEFT lower lobectomy for lung CA presumed to be in remission, COPD, DM2, recently diagnosed with LLE DVT, admitted with fever of uncertain etiology
a/p - 81 yo F with lung, breast CA and melonoma who had LOC in 2017 that workup done in my office was nL.    In setting of fevers found in ER, at home had witness LOC.  She is now back to baseline. Exam nonfocal limited historian.    The issue is question of sz, the lack of postictal state, b/b incontinence or tongue biting make seizure less likely.    Even if did, was in the setting of fever, would be characterized as provoked and does in itself not require treatment.    CT head negative  EEG normal  Exam stable  Has been  afebrile in last 24 hours  Recent DVT on AC  MRI Cervical, thoracic and lumbar degenerative changes, no mets.  Started on prednisone for possible PMR    Plan:    Continue prednisone as per rheum  Continue supportive care per medicine  On AC for DVT  Frequent reorientation  PT.  for rehab soon  no further inpt neuro workup  D/w patient.
a/p - 81 yo F with lung, breast CA and melonoma who had LOC in 2017 that workup done in my office was nL.    In setting of fevers found in ER, at home had witness LOC.  She is now back to baseline. Exam nonfocal limited historian.    The issue is question of sz, the lack of postictal state, b/b incontinence or tongue biting make seizure less likely.    Even if did, was in the setting of fever, would be characterized as provoked and does in itself not require treatment.    CT head negative  EEG normal  Exam stable  Has been febrile in last 24 hours  Recent DVT on AC    Plan:  Continue supportive care per medicine  ID following as recent fever  On AC for DVT  Frequent reorientation  will follow, no further inpt neuro workup  D/w patient.
a/p - 81 yo F with lung, breast CA and melonoma who had LOC in 2017 that workup done in my office was nL.    In setting of fevers found in ER, at home had witness LOC.  She is now back to baseline. Exam nonfocal limited historian.    The issue is question of sz, the lack of postictal state, b/b incontinence or tongue biting make seizure less likely.    Even if did, was in the setting of fever, would be characterized as provoked and does in itself not require treatment.    CT head negative  EEG normal  Exam stable  fevers have been resolving, low grade 99.8 today, ID following no clear source- RVP negative  Recent DVT on AC    Plan:  Continue supportive care per medicine  Monitory fever curves  ID following  On AC for DVT  Frequent reorientation  Serial exams  will follow   D/w patient.
a/p - 83 yo F with lung, breast CA and melonoma who had LOC in 2017 that workup done in my office was nL.    In setting of fevers found in ER, at home had witness LOC.  She is now back to baseline. Exam nonfocal limited historian.    The issue is question of sz, the lack of postictal state, b/b incontinence or tongue biting make seizure less likely.    Even if did, was in the setting of fever, would be characterized as provoked and does in itself not require treatment.    CT head negative  EEG normal  Exam stable  Has been  afebrile in last 24 hours  Recent DVT on AC  MRI Cervical and lumbar degenerative changes, no mets.  Started on prednisone for possible PMR    Plan:    Continue prednisone as per rheum  Continue supportive care per medicine  On AC for DVT  Frequent reorientation  PT  will follow, no further inpt neuro workup  D/w patient.
82F with h/o lung ca s/p lobectomy, melanoma s/p resection on right arm and lymph removal on that side, recent DVT on AC  presents fever and chills s/p fall  hospital course complicated by persistent fevers without focal source  urinary retention now with mcadams removed for tov
83 y/o F with a history of lung CA with past LEFT lower lobectomy for lung CA presumed to be in remission, COPD, DM2, recently diagnosed with LLE DVT, admitted with fever of uncertain etiology
83 y/o Female with a history of lung CA with past LEFT lower lobectomy for lung CA presumed to be in remission, COPD, DM2, recently diagnosed with LLE DVT, admitted with fever of uncertain etiology
81 y/o Female with a history of lung CA with past LEFT lower lobectomy for lung CA presumed to be in remission, COPD, DM2, recently diagnosed with LLE DVT, admitted with fever of uncertain etiology
83 y/o Female with a history of lung CA with past LEFT lower lobectomy for lung CA presumed to be in remission, COPD, DM2, recently diagnosed with LLE DVT, admitted with fever of uncertain etiology
81 y/o Female with a history of lung CA with past LEFT lower lobectomy for lung CA presumed to be in remission, COPD, DM2, recently diagnosed with LLE DVT, admitted with fever of uncertain etiology

## 2018-02-20 NOTE — PROGRESS NOTE ADULT - PROBLEM SELECTOR PLAN 2
appreciate neuro  doubt acute seizure at home  EEG reported as negative  her shaking at home could have been 2' rigors associated with fevers
TSH testing  if fever recurs, will do CT chest/abd/pelvis with IV and PO contrast to rule out abscess/tumor  appreciate ID
continue anticoagulation
On Coumadin for chronic recurrent DVT.  Patient / family agree to continue with full AC and aware of risks/benefits.
TSH testing  if fever recurs, will do CT chest/abd/pelvis with IV and PO contrast to rule out abscess/tumor  appreciate ID
appreciate neuro  doubt acute seizure at home  EEG reported as negative
appreciate neuro  doubt acute seizure at home  EEG reported as negative
appreciate neuro  doubt acute seizure at home  EEG reported as negative  her shaking at home could have been 2' rigors associated with fevers
continue anticoagulation
if she has another temp spike, will repanculture  appreciate ID
continue anticoagulation
On Coumadin for chronic recurrent DVT.  Patient / family agree to continue with full AC and aware of risks/benefits.
appears to have resolved on its own  appreciate ID
appreciate neuro  doubt acute seizure at home  EEG reported as negative  her shaking at home could have been 2' rigors associated with fevers
appears to have resolved on its own  appreciate ID

## 2018-02-20 NOTE — PROGRESS NOTE ADULT - PROBLEM SELECTOR PLAN 7
no further lasix, euvolemic on exam
lasix ordered
no further lasix, euvolemic on exam

## 2018-02-20 NOTE — PROGRESS NOTE ADULT - PROBLEM SELECTOR PROBLEM 2
Seizure
Fever
DVT (deep venous thrombosis)
Fever
Fever
Seizure
DVT (deep venous thrombosis)
DVT (deep venous thrombosis)
Fever
Seizure
Fever

## 2018-02-20 NOTE — PROGRESS NOTE ADULT - PROBLEM SELECTOR PROBLEM 6
Urine retention

## 2018-02-20 NOTE — PROGRESS NOTE ADULT - SUBJECTIVE AND OBJECTIVE BOX
No new symptoms    Vital Signs Last 24 Hrs  T(C): 36.4 (20 Feb 2018 11:21), Max: 36.8 (20 Feb 2018 04:25)  T(F): 97.6 (20 Feb 2018 11:21), Max: 98.3 (20 Feb 2018 10:30)  HR: 66 (20 Feb 2018 11:21) (56 - 76)  BP: 126/68 (20 Feb 2018 11:21) (106/61 - 126/68)  BP(mean): --  RR: 18 (20 Feb 2018 11:21) (18 - 18)  SpO2: 96% (20 Feb 2018 11:21) (94% - 100%)    GENERAL: NAD, at baseline MS  HEAD:  Atraumatic, Normocephalic  EYES: EOMI  NECK: Supple, No JVD, No LAD  CHEST/LUNG: Clear to auscultation bilaterally  HEART: Regular rate and rhythm; nl S1/S2; No murmurs, rubs, or gallops  ABDOMEN: Soft, Nontender, Nondistended; Bowel sounds present  EXTREMITIES:  2+ b/l LE edema; (+)mcadams draining clear yellow urine    LABS:                        11.1   6.95  )-----------( 454      ( 20 Feb 2018 07:23 )             35.4     02-20    138  |  101  |  23  ----------------------------<  185<H>  4.6   |  29  |  0.74    Ca    8.8      20 Feb 2018 07:29      PT/INR - ( 20 Feb 2018 07:24 )   PT: 33.6 sec;   INR: 2.91 ratio         PTT - ( 19 Feb 2018 06:39 )  PTT:36.5 sec  CAPILLARY BLOOD GLUCOSE      POCT Blood Glucose.: 257 mg/dL (20 Feb 2018 12:04)  POCT Blood Glucose.: 155 mg/dL (20 Feb 2018 08:03)  POCT Blood Glucose.: 235 mg/dL (19 Feb 2018 21:23)  POCT Blood Glucose.: 194 mg/dL (19 Feb 2018 16:36)

## 2018-02-20 NOTE — DISCHARGE NOTE ADULT - CARE PROVIDER_API CALL
Darrick Morocho), Internal Medicine  [address]  [address]  [place] [address]  Phone: [phone]  Fax: [phone]    Andrzej Stroud (), Internal Medicine; Nuclear Cardiology  [address] [place] [address]  Phone: [phone]  Fax: [phone]

## 2018-02-20 NOTE — CONSULT NOTE ADULT - ASSESSMENT
82 F PMH Afib, lung cancer s/p R lobectomy, malignant melanoma, breast CA s/p lumpectomy (cancer free since 2015), DM, with recently diagnosed DVT.      Plan:  1.  Continue with anticoagulation with coumadin dosing  2.  Goal INR is 2-3  3.  Appreciate rheumatology and ID consultation  4.  Trial of void today   5.  OOB and ambulate with PT  6.  No further cardiac or vascular testing while inpatient.  Patient is asymptomatic and appears euvolemic on exam.         Thanks      Andrzej Stroud  Vascular Medicine and Cardiology  [phone]

## 2018-02-20 NOTE — PROGRESS NOTE ADULT - PROBLEM SELECTOR PLAN 8
repleted with IV Na PO4.  Resolved

## 2018-02-20 NOTE — PROGRESS NOTE ADULT - PROBLEM SELECTOR PLAN 5
her shaking could have been 2' syncope  TTE shows stage 2 diastolic chronic CHF, no severe AS or LVOT  tele unremarkable
TTE shows stage 2 diastolic chronic CHF, no severe AS or LVOT  tele unremarkable
her shaking could have been 2' syncope  TTE shows stage 2 diastolic chronic CHF, no severe AS or LVOT  tele unremarkable
by ct scan -- no symptoms  on cipro and flagyl complete course
by ct scan -- no symptoms  on cipro and flagyl complete course 7 days total  today is day 5
TTE shows stage 2 diastolic chronic CHF, no severe AS or LVOT  tele unremarkable
by ct scan -- no symptoms  on cipro and flagyl complete course
her shaking could have been 2' syncope  TTE shows stage 2 diastolic chronic CHF, no severe AS or LVOT  tele unremarkable
mcadams, perhaps with increased ambulation, will pass TOV in 2-3 days
her shaking could have been 2' syncope  TTE shows stage 2 diastolic chronic CHF, no severe AS or LVOT  tele unremarkable
TTE shows stage 2 diastolic chronic CHF, no severe AS or LVOT  tele unremarkable

## 2018-02-20 NOTE — DISCHARGE NOTE ADULT - MEDICATION SUMMARY - MEDICATIONS TO TAKE
I will START or STAY ON the medications listed below when I get home from the hospital:    predniSONE 5 mg oral tablet  -- 1 tab(s) by mouth 2 times a day  -- Indication: For PMR     acetaminophen 325 mg oral tablet  -- 2 tab(s) by mouth every 6 hours for pain   -- Indication: For Pain     Coumadin 2 mg oral tablet  -- 1 tab(s) by mouth once a day (at bedtime) WIth INR check for daily dosing   -- Do not take this drug if you are pregnant.  It is very important that you take or use this exactly as directed.  Do not skip doses or discontinue unless directed by your doctor.  Obtain medical advice before taking any non-prescription drugs as some may affect the action of this medication.    -- Indication: For DVT (deep venous thrombosis)    Anoro Ellipta 62.5 mcg-25 mcg/inh inhalation powder  -- 1 puff(s) inhaled once a day  -- Indication: For Copd     lidocaine 5% topical film  --  on skin 12hr on 12hrs off   -- Indication: For Pain     cholecalciferol oral tablet  -- 1000 unit(s) by mouth once a day  -- Indication: For Supplement

## 2018-02-20 NOTE — PROGRESS NOTE ADULT - PROBLEM SELECTOR PLAN 3
Follow FS BID.  HgbA1C.    consider basal insulin if indicated by FS
INR goal 2-3
management per primary team
Follow FS BID.  HgbA1C.    consider basal insulin if indicated by FS
INR goal 2-3
management per primary team
on coumadin
on coumadin
management per primary team
INR goal 2-3
ordered coumadin 6 mg today

## 2018-02-20 NOTE — PROGRESS NOTE ADULT - PROBLEM SELECTOR PLAN 4
blood pressure low yesterday; encourage PO intake  TTE pending  tele
HISS
HISS
Follow FS BID.   consider basal insulin if indicated by FS
check ct neck r/o fx, arthritis  f/u CT neck   rheum workup?  CURTIS, RF
check ct neck r/o fx, arthritis  f/u CT neck   rheum workup?  CURTIS, RF
Follow FS BID.   consider basal insulin if indicated by FS
HISS
blood pressure low yesterday; encourage PO intake  TTE shows stage 2 diastolic chronic CHF, no severe AS or LVOT  tele unremarkable
check ct neck r/o fx, arthritis
check ct neck r/o fx, arthritis  f/u CT neck   rheum workup?  CURTIS, RF
HISS
HISS

## 2018-02-20 NOTE — DISCHARGE NOTE ADULT - PATIENT PORTAL LINK FT
You can access the [facility] Portal, offered by [facility], by registering with the following website: [email or web address]

## 2018-02-20 NOTE — PROGRESS NOTE ADULT - PROBLEM SELECTOR PROBLEM 7
Chronic diastolic (congestive) heart failure

## 2018-02-20 NOTE — DISCHARGE NOTE ADULT - MEDICATION SUMMARY - MEDICATIONS TO CHANGE
I will SWITCH the dose or number of times a day I take the medications listed below when I get home from the hospital:    Coumadin 5 mg oral tablet  -- 1 tab(s) by mouth once a day as per INR  -- Do not take this drug if you are pregnant.  It is very important that you take or use this exactly as directed.  Do not skip doses or discontinue unless directed by your doctor.  Obtain medical advice before taking any non-prescription drugs as some may affect the action of this medication.

## 2018-04-13 ENCOUNTER — APPOINTMENT (OUTPATIENT)
Dept: CARDIOLOGY | Facility: CLINIC | Age: 83
End: 2018-04-13
Payer: MEDICARE

## 2018-04-13 VITALS — SYSTOLIC BLOOD PRESSURE: 123 MMHG | OXYGEN SATURATION: 92 % | HEART RATE: 87 BPM | DIASTOLIC BLOOD PRESSURE: 68 MMHG

## 2018-04-13 PROCEDURE — 99214 OFFICE O/P EST MOD 30 MIN: CPT

## 2018-04-19 NOTE — ED ADULT NURSE NOTE - DISCHARGE DATE/TIME
At patient's LOV 4/17/18 for PMR:  -Refilled prednisone 5 mg daily for 5-7 days during flareups of joint pain or swelling    This RX was sent to [facility] mail order pharmacy but they can only fill 3-month supply of medication.  Patient requesting transfer of RX to local [facility] pharmacy.    Script approved per [facility] protocol/ per VO Dr. Michael.  Eprescribed to preferred pharmacy.      LM on patient's VM to advise.  
Patient calling stating mail order would only fill 3 month supply of prednisone medication, patient was informed to ask MD to send prednisone script to [facility] instead, any questions can call pt back on cell #   
19-Jan-2018 21:54

## 2018-04-20 ENCOUNTER — RX RENEWAL (OUTPATIENT)
Age: 83
End: 2018-04-20

## 2018-10-31 ENCOUNTER — RX RENEWAL (OUTPATIENT)
Age: 83
End: 2018-10-31

## 2018-12-20 ENCOUNTER — INPATIENT (INPATIENT)
Facility: HOSPITAL | Age: 83
LOS: 7 days | Discharge: HOME | DRG: 189 | End: 2018-12-28
Attending: HOSPITALIST | Admitting: HOSPITALIST
Payer: MEDICARE

## 2018-12-20 VITALS
WEIGHT: 125 LBS | SYSTOLIC BLOOD PRESSURE: 129 MMHG | HEIGHT: 62 IN | OXYGEN SATURATION: 94 % | RESPIRATION RATE: 20 BRPM | DIASTOLIC BLOOD PRESSURE: 80 MMHG | HEART RATE: 80 BPM

## 2018-12-20 DIAGNOSIS — Z96.60 PRESENCE OF UNSPECIFIED ORTHOPEDIC JOINT IMPLANT: Chronic | ICD-10-CM

## 2018-12-20 DIAGNOSIS — J44.9 CHRONIC OBSTRUCTIVE PULMONARY DISEASE, UNSPECIFIED: ICD-10-CM

## 2018-12-20 DIAGNOSIS — Z29.9 ENCOUNTER FOR PROPHYLACTIC MEASURES, UNSPECIFIED: ICD-10-CM

## 2018-12-20 DIAGNOSIS — N17.9 ACUTE KIDNEY FAILURE, UNSPECIFIED: ICD-10-CM

## 2018-12-20 DIAGNOSIS — Z98.89 OTHER SPECIFIED POSTPROCEDURAL STATES: Chronic | ICD-10-CM

## 2018-12-20 DIAGNOSIS — G91.2 (IDIOPATHIC) NORMAL PRESSURE HYDROCEPHALUS: ICD-10-CM

## 2018-12-20 DIAGNOSIS — I82.409 ACUTE EMBOLISM AND THROMBOSIS OF UNSPECIFIED DEEP VEINS OF UNSPECIFIED LOWER EXTREMITY: ICD-10-CM

## 2018-12-20 DIAGNOSIS — R63.8 OTHER SYMPTOMS AND SIGNS CONCERNING FOOD AND FLUID INTAKE: ICD-10-CM

## 2018-12-20 DIAGNOSIS — M54.6 PAIN IN THORACIC SPINE: ICD-10-CM

## 2018-12-20 DIAGNOSIS — J96.91 RESPIRATORY FAILURE, UNSPECIFIED WITH HYPOXIA: ICD-10-CM

## 2018-12-20 LAB
ALBUMIN SERPL ELPH-MCNC: 4.1 G/DL — SIGNIFICANT CHANGE UP (ref 3.3–5)
ALP SERPL-CCNC: 76 U/L — SIGNIFICANT CHANGE UP (ref 40–120)
ALT FLD-CCNC: 7 U/L — LOW (ref 10–45)
ANION GAP SERPL CALC-SCNC: 13 MMOL/L — SIGNIFICANT CHANGE UP (ref 5–17)
APTT BLD: 36.9 SEC — HIGH (ref 27.5–36.3)
AST SERPL-CCNC: 12 U/L — SIGNIFICANT CHANGE UP (ref 10–40)
BASOPHILS # BLD AUTO: 0 K/UL — SIGNIFICANT CHANGE UP (ref 0–0.2)
BASOPHILS NFR BLD AUTO: 0.1 % — SIGNIFICANT CHANGE UP (ref 0–2)
BILIRUB SERPL-MCNC: 0.8 MG/DL — SIGNIFICANT CHANGE UP (ref 0.2–1.2)
BUN SERPL-MCNC: 26 MG/DL — HIGH (ref 7–23)
CALCIUM SERPL-MCNC: 9.7 MG/DL — SIGNIFICANT CHANGE UP (ref 8.4–10.5)
CHLORIDE SERPL-SCNC: 105 MMOL/L — SIGNIFICANT CHANGE UP (ref 96–108)
CO2 SERPL-SCNC: 24 MMOL/L — SIGNIFICANT CHANGE UP (ref 22–31)
CREAT SERPL-MCNC: 1.05 MG/DL — SIGNIFICANT CHANGE UP (ref 0.5–1.3)
EOSINOPHIL # BLD AUTO: 0 K/UL — SIGNIFICANT CHANGE UP (ref 0–0.5)
EOSINOPHIL NFR BLD AUTO: 0.1 % — SIGNIFICANT CHANGE UP (ref 0–6)
GLUCOSE SERPL-MCNC: 144 MG/DL — HIGH (ref 70–99)
HCOV OC43 RNA SPEC QL NAA+PROBE: DETECTED
HCT VFR BLD CALC: 41.2 % — SIGNIFICANT CHANGE UP (ref 34.5–45)
HGB BLD-MCNC: 14 G/DL — SIGNIFICANT CHANGE UP (ref 11.5–15.5)
INR BLD: 1.89 RATIO — HIGH (ref 0.88–1.16)
LYMPHOCYTES # BLD AUTO: 1.4 K/UL — SIGNIFICANT CHANGE UP (ref 1–3.3)
LYMPHOCYTES # BLD AUTO: 20.4 % — SIGNIFICANT CHANGE UP (ref 13–44)
MCHC RBC-ENTMCNC: 30.3 PG — SIGNIFICANT CHANGE UP (ref 27–34)
MCHC RBC-ENTMCNC: 34.1 GM/DL — SIGNIFICANT CHANGE UP (ref 32–36)
MCV RBC AUTO: 89.1 FL — SIGNIFICANT CHANGE UP (ref 80–100)
MONOCYTES # BLD AUTO: 0.8 K/UL — SIGNIFICANT CHANGE UP (ref 0–0.9)
MONOCYTES NFR BLD AUTO: 11.3 % — SIGNIFICANT CHANGE UP (ref 2–14)
NEUTROPHILS # BLD AUTO: 4.8 K/UL — SIGNIFICANT CHANGE UP (ref 1.8–7.4)
NEUTROPHILS NFR BLD AUTO: 68 % — SIGNIFICANT CHANGE UP (ref 43–77)
PLATELET # BLD AUTO: 166 K/UL — SIGNIFICANT CHANGE UP (ref 150–400)
POTASSIUM SERPL-MCNC: 3.5 MMOL/L — SIGNIFICANT CHANGE UP (ref 3.5–5.3)
POTASSIUM SERPL-SCNC: 3.5 MMOL/L — SIGNIFICANT CHANGE UP (ref 3.5–5.3)
PROT SERPL-MCNC: 7.5 G/DL — SIGNIFICANT CHANGE UP (ref 6–8.3)
PROTHROM AB SERPL-ACNC: 22.2 SEC — HIGH (ref 10–12.9)
RAPID RVP RESULT: DETECTED
RBC # BLD: 4.62 M/UL — SIGNIFICANT CHANGE UP (ref 3.8–5.2)
RBC # FLD: 12.9 % — SIGNIFICANT CHANGE UP (ref 10.3–14.5)
SODIUM SERPL-SCNC: 142 MMOL/L — SIGNIFICANT CHANGE UP (ref 135–145)
TROPONIN T, HIGH SENSITIVITY RESULT: 13 NG/L — SIGNIFICANT CHANGE UP (ref 0–51)
TROPONIN T, HIGH SENSITIVITY RESULT: 14 NG/L — SIGNIFICANT CHANGE UP (ref 0–51)
WBC # BLD: 7.1 K/UL — SIGNIFICANT CHANGE UP (ref 3.8–10.5)
WBC # FLD AUTO: 7.1 K/UL — SIGNIFICANT CHANGE UP (ref 3.8–10.5)

## 2018-12-20 PROCEDURE — 99285 EMERGENCY DEPT VISIT HI MDM: CPT | Mod: GC,25

## 2018-12-20 PROCEDURE — 73502 X-RAY EXAM HIP UNI 2-3 VIEWS: CPT | Mod: 26,LT

## 2018-12-20 PROCEDURE — 93010 ELECTROCARDIOGRAM REPORT: CPT

## 2018-12-20 PROCEDURE — 71250 CT THORAX DX C-: CPT | Mod: 26

## 2018-12-20 PROCEDURE — 70450 CT HEAD/BRAIN W/O DYE: CPT | Mod: 26

## 2018-12-20 PROCEDURE — 72128 CT CHEST SPINE W/O DYE: CPT | Mod: 26

## 2018-12-20 PROCEDURE — 99223 1ST HOSP IP/OBS HIGH 75: CPT

## 2018-12-20 PROCEDURE — 72131 CT LUMBAR SPINE W/O DYE: CPT | Mod: 26

## 2018-12-20 RX ORDER — SODIUM CHLORIDE 9 MG/ML
1000 INJECTION INTRAMUSCULAR; INTRAVENOUS; SUBCUTANEOUS
Qty: 0 | Refills: 0 | Status: DISCONTINUED | OUTPATIENT
Start: 2018-12-20 | End: 2018-12-22

## 2018-12-20 RX ORDER — CHOLECALCIFEROL (VITAMIN D3) 125 MCG
1000 CAPSULE ORAL DAILY
Qty: 0 | Refills: 0 | Status: DISCONTINUED | OUTPATIENT
Start: 2018-12-20 | End: 2018-12-28

## 2018-12-20 RX ORDER — ACETAMINOPHEN 500 MG
650 TABLET ORAL EVERY 6 HOURS
Qty: 0 | Refills: 0 | Status: DISCONTINUED | OUTPATIENT
Start: 2018-12-20 | End: 2018-12-28

## 2018-12-20 RX ORDER — WARFARIN SODIUM 2.5 MG/1
9 TABLET ORAL ONCE
Qty: 0 | Refills: 0 | Status: COMPLETED | OUTPATIENT
Start: 2018-12-20 | End: 2018-12-20

## 2018-12-20 RX ADMIN — WARFARIN SODIUM 9 MILLIGRAM(S): 2.5 TABLET ORAL at 23:29

## 2018-12-20 RX ADMIN — SODIUM CHLORIDE 80 MILLILITER(S): 9 INJECTION INTRAMUSCULAR; INTRAVENOUS; SUBCUTANEOUS at 23:32

## 2018-12-20 NOTE — ED PROVIDER NOTE - MEDICAL DECISION MAKING DETAILS
82yo hx lung ca s/p lumpectomy, coumadin for DVT presenting for frequent falls over last year. CT chest and head. xray pelvis, labs, admit.

## 2018-12-20 NOTE — ED PROVIDER NOTE - ATTENDING CONTRIBUTION TO CARE
Private Physician Sal Canchola  83y female Afib, Coumdin for DVT, DM,Lung ca no chemo/rt,Melanoma sp resection, Breast Ca, Pt comes to ed complains of increasing falls over past few month. Twice yesterday. Pt lives alone pain in back, No cp/sob/palps/abd pain/nvdc/FC/Dysuria. PE WDWN female awake alert normocephalic atraumatic neck supple cv no rubs, gallops or murmurs, neuro no focal defedts. Chest +mid-tspine ttp. No visible lesion,CV no rmg, Abd soft +bs no mass guarding  Guerrero Moss MD, Facep

## 2018-12-20 NOTE — H&P ADULT - PROBLEM SELECTOR PLAN 4
CT thoracic spine shows no acute fracture  Tylenol Q6hrs PRN pain  PT eval    Also has coccyx pain and low back pain (more nonspecific in lumbar spine)  Will obtain CT lumbar spine to r/o fracture  Tylenol as above.

## 2018-12-20 NOTE — ED ADULT NURSE NOTE - OBJECTIVE STATEMENT
83 y.o F arrived via EMS s/p 2 falls in her home yesterday. A&Ox3. accompanied by daughter. h/o "onset of Alzheimer's", lung CA s/p lobectomy and lymph nodes removed, malignant melanoma no current CA. pt states she is unsure what caused her to fall but denies CP or palpitations prior to falling. states she hit back of her head endorses pain to back of head and to her back, unable to specify location of back pain denies pain anywhere else. typically ambulated with walker. daughter state spt has had 4 falls in the last year other than the 2 yesterday. daughter states pt is in process of obtaining hearing aid but states her hearing may be "connected to imbalance" but is unsure. pt denies LOC. is on coumadin for h/o DVT. respirations nonlabored pt in no acute distress O2 sat 91% RA improved with 2L NC maintaining above 95%. abdomen soft neuro intact able to move all extremities. Denies CP, SOB< N/V/D, fevers, chills, dizziness, pain/burning upon urination. Safety and comfort provided/maintained. Daughter at bedside

## 2018-12-20 NOTE — H&P ADULT - PROBLEM SELECTOR PLAN 3
Continue Coumadin: Coumadin 9mg on Monday and Friday and 6mg every other day.  Will give 9mg tonight as she is subtherapeutic.  Daughter not sure when last DVT was but thinks around spring (definitely w/in last 1 year).

## 2018-12-20 NOTE — H&P ADULT - ASSESSMENT
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

## 2018-12-20 NOTE — H&P ADULT - PROBLEM SELECTOR PLAN 5
Daughter says pt does not drink enough. Suspect prerenal as exam supports dehydration with dry MM.   NS @80mls/hr Well controlled on Aero ellipto  Can take her own (has it with her) when verified by pharmacy.

## 2018-12-20 NOTE — H&P ADULT - PROBLEM SELECTOR PLAN 6
Coumadin Daughter says pt does not drink enough. Suspect prerenal as exam supports dehydration with dry MM.   NS @80mls/hr

## 2018-12-20 NOTE — H&P ADULT - PROBLEM SELECTOR PLAN 1
PT with more frequent falls, steady decline in gait now having to use walker- feels unsteady no dizziness, LOC, syncope or vertigo symptoms  Has urinary incontinence and slowly progressing memory deficits  CT head shows enlarged ventricles suggesting along with symptoms NPH  Pt sees [facility] and should be evaluated here for NPH.  Vitamin D3 for falls (home med)

## 2018-12-20 NOTE — ED PROVIDER NOTE - PHYSICAL EXAMINATION
Gen: No acute distress, alert, cooperative  Head: Normocephalic, Atraumatic  HEENT: PERRL, oral mucosa moist, normal conjunctiva. Mid thoracic spine TTP, non-tender elsewhere.   Lung: CTAB, no respiratory distress, no crackles or wheezes  CV: rrr, no murmur  Abd: soft, NTND, no rebound or guarding  MSK: NICKY LE edema  Neuro: No focal neurologic deficits  Skin: Warm and dry, no evidence of rash   Psych: normal affect, follows commands

## 2018-12-20 NOTE — ED PROVIDER NOTE - OBJECTIVE STATEMENT
82yo hx lung ca s/p lumpectomy, coumadin for DVT presenting for frequent falls over last year. Fell twice yesterday. Last fall fell backward onto back, butt, head. Complaining of mid back pain, no other complaints. Denies cp, sob, dysuria, n/v/c/d. Daughter states patient getting start of dementia, unsafe at home, and cant walk alone without falling. Currently lives by herself in apt above daughter.

## 2018-12-20 NOTE — H&P ADULT - PROBLEM SELECTOR PLAN 2
Check RVP, has nasal congestion  CT chest does not show any infiltrate, has lobe resection  Could be 2/2 pain in the thoracic spine impeding full breaths  Pain control- pt wants Tylenol to start and incentive spirometry

## 2018-12-20 NOTE — H&P ADULT - NEUROLOGICAL DETAILS
deep reflexes intact/deep reflexes hyperactive/responds to verbal commands/alert and oriented x 3/responds to pain/sensation intact/superficial reflexes intact

## 2018-12-20 NOTE — H&P ADULT - MENTAL STATUS
No prnator drift  Finger to nose and alternating hand movements in tact  No nystagmus  Tongue midline  Pupils equil  RUE and LUE strength 5/5   RLE and LLE strength 4/5 equal b/l

## 2018-12-20 NOTE — H&P ADULT - NSHPLABSRESULTS_GEN_ALL_CORE
14.0   7.1   )-----------( 166      ( 20 Dec 2018 14:09 )             41.2     20 Dec 2018 14:09    142    |  105    |  26     ----------------------------<  144    3.5     |  24     |  1.05     Ca    9.7        20 Dec 2018 14:09    TPro  7.5    /  Alb  4.1    /  TBili  0.8    /  DBili  x      /  AST  12     /  ALT  7      /  AlkPhos  76     20 Dec 2018 14:09    LIVER FUNCTIONS - ( 20 Dec 2018 14:09 )  Alb: 4.1 g/dL / Pro: 7.5 g/dL / ALK PHOS: 76 U/L / ALT: 7 U/L / AST: 12 U/L / GGT: x           PT/INR - ( 20 Dec 2018 14:09 )   PT: 22.2 sec;   INR: 1.89 ratio         PTT - ( 20 Dec 2018 14:09 )  PTT:36.9 sec  CAPILLARY BLOOD GLUCOSE            CT chest 12/20: IMPRESSION:     1.  Left lower lobe lobectomy.  2.  The lungs are clear.      CT head 12/20:    IMPRESSION:    Enlarged lateral and third ventricles, with slight sulcal effacement near   the vertex, this can be seen with normal pressure hydrocephalus,   microvascular disease with remote and age-indeterminate lacunar infarcts,   there is no acute hemorrhage or midline shift.    Xray hip 12/20:  The patient is status post total left hip arthroplasty. Hardware appears   intact. No acute    Prostatic fracture is identified. There is chronic appearing ossification   in the region of the distal left iliopsoas tendon. The remaining pelvic   bones demonstrate no evidence of fracture. The right hip joint space is   fairly well preserved. There is no avascular necrosis identified. There   is mild bilateral sacroiliac and pubic symphysis arthrosis. Bones are   demineralized.    If occult fracture is a persistent clinical concern an MRI can be   performed for more sensitive evaluation.    Vascular calcification noted. Overlying the right inferior pubic ramus.   There is severe disc degeneration in the lower lumbar spine.    CT thoracic spine:   The vertebral bodies are normal in height and density. Small right-sided   osteophytes are identified from T7 through T 11. No acute fractures or   dislocations are identified. There is mild dextroscoliosis. Spinal soft   tissues are unremarkable.      The dental note is made of splenic artery calcification.     IMPRESSION: Mild degenerative changes of the thoracic spine. No acute   fractures or dislocations.

## 2018-12-20 NOTE — H&P ADULT - NSHPPHYSICALEXAM_GEN_ALL_CORE
Vital Signs Last 24 Hrs  T(C): 37.2 (20 Dec 2018 16:30), Max: 37.2 (20 Dec 2018 16:30)  T(F): 99 (20 Dec 2018 16:30), Max: 99 (20 Dec 2018 16:30)  HR: 72 (20 Dec 2018 16:30) (72 - 81)  BP: 154/79 (20 Dec 2018 16:30) (129/80 - 154/79)  BP(mean): --  RR: 18 (20 Dec 2018 16:30) (18 - 20)  SpO2: 96% (20 Dec 2018 16:30) (91% - 96%)

## 2018-12-21 LAB
APPEARANCE UR: CLEAR — SIGNIFICANT CHANGE UP
BACTERIA # UR AUTO: ABNORMAL
BILIRUB UR-MCNC: NEGATIVE — SIGNIFICANT CHANGE UP
COLOR SPEC: SIGNIFICANT CHANGE UP
DIFF PNL FLD: NEGATIVE — SIGNIFICANT CHANGE UP
EPI CELLS # UR: 1 /HPF — SIGNIFICANT CHANGE UP
GLUCOSE UR QL: NEGATIVE — SIGNIFICANT CHANGE UP
HYALINE CASTS # UR AUTO: 1 /LPF — SIGNIFICANT CHANGE UP (ref 0–2)
INR BLD: 1.83 RATIO — HIGH (ref 0.88–1.16)
KETONES UR-MCNC: NEGATIVE — SIGNIFICANT CHANGE UP
LEUKOCYTE ESTERASE UR-ACNC: ABNORMAL
NITRITE UR-MCNC: NEGATIVE — SIGNIFICANT CHANGE UP
PH UR: 6 — SIGNIFICANT CHANGE UP (ref 5–8)
PROT UR-MCNC: ABNORMAL
PROTHROM AB SERPL-ACNC: 21.3 SEC — HIGH (ref 10–12.9)
RBC CASTS # UR COMP ASSIST: 5 /HPF — HIGH (ref 0–4)
SP GR SPEC: 1.01 — SIGNIFICANT CHANGE UP (ref 1.01–1.02)
UROBILINOGEN FLD QL: NEGATIVE — SIGNIFICANT CHANGE UP
WBC UR QL: 58 /HPF — HIGH (ref 0–5)

## 2018-12-21 RX ORDER — WARFARIN SODIUM 2.5 MG/1
9 TABLET ORAL ONCE
Qty: 0 | Refills: 0 | Status: COMPLETED | OUTPATIENT
Start: 2018-12-21 | End: 2018-12-21

## 2018-12-21 RX ADMIN — Medication 1000 UNIT(S): at 11:34

## 2018-12-21 RX ADMIN — WARFARIN SODIUM 9 MILLIGRAM(S): 2.5 TABLET ORAL at 21:24

## 2018-12-21 NOTE — PROGRESS NOTE ADULT - PROBLEM SELECTOR PLAN 1
PT with more frequent falls, steady decline in gait now having to use walker- feels unsteady no dizziness, LOC, syncope or vertigo symptoms  Has urinary incontinence and slowly progressing memory deficits  CT head shows enlarged ventricles suggesting along with symptoms NPH  Pt sees Dr. Gongora (). consult placed with the office  c/w Vitamin D3 (home med)

## 2018-12-21 NOTE — PROGRESS NOTE ADULT - SUBJECTIVE AND OBJECTIVE BOX
Patient is a 83y old  Female who presents with a chief complaint of NPH (20 Dec 2018 16:55)      SUBJECTIVE / OVERNIGHT EVENTS:  feel okay.  no complaints.  "I fell two times at home".  the daughter on the phone.  denied LOC. no cp, no sob.  no n/v/d.       Vital Signs Last 24 Hrs  T(C): 36.9 (21 Dec 2018 11:45), Max: 36.9 (21 Dec 2018 11:45)  T(F): 98.4 (21 Dec 2018 11:45), Max: 98.4 (21 Dec 2018 11:45)  HR: 76 (21 Dec 2018 11:45) (67 - 76)  BP: 136/76 (21 Dec 2018 11:45) (133/70 - 139/67)  BP(mean): --  RR: 18 (21 Dec 2018 11:45) (18 - 18)  SpO2: 98% (21 Dec 2018 11:45) (93% - 98%)  I&O's Summary    20 Dec 2018 07:01  -  21 Dec 2018 07:00  --------------------------------------------------------  IN: 990 mL / OUT: 0 mL / NET: 990 mL    21 Dec 2018 07:01  -  21 Dec 2018 21:42  --------------------------------------------------------  IN: 300 mL / OUT: 0 mL / NET: 300 mL        PHYSICAL EXAM:  GENERAL: NAD, Comfortable, lying in bed  HEAD:  Atraumatic, Normocephalic  EYES: EOMI, PERRLA, conjunctiva and sclera clear  NECK: Supple, No JVD  CHEST/LUNG: Clear to auscultation bilaterally; No wheeze  HEART: Regular rate and rhythm; No murmurs, rubs, or gallops  ABDOMEN: Soft, Nontender, Nondistended; Bowel sounds present  Neuro: AAO x 3, no focal deficit, 5/5 b/l extremities  EXTREMITIES:  2+ Peripheral Pulses, No clubbing, cyanosis, or edema  SKIN: No rashes or lesions    LABS:                        14.0   7.1   )-----------( 166      ( 20 Dec 2018 14:09 )             41.2     12    142  |  105  |  26<H>  ----------------------------<  144<H>  3.5   |  24  |  1.05    Ca    9.7      20 Dec 2018 14:09    TPro  7.5  /  Alb  4.1  /  TBili  0.8  /  DBili  x   /  AST  12  /  ALT  7<L>  /  AlkPhos  76  12-20    PT/INR - ( 21 Dec 2018 13:46 )   PT: 21.3 sec;   INR: 1.83 ratio         PTT - ( 20 Dec 2018 14:09 )  PTT:36.9 sec  CAPILLARY BLOOD GLUCOSE            Urinalysis Basic - ( 21 Dec 2018 07:26 )    Color: Light Yellow / Appearance: Clear / S.013 / pH: x  Gluc: x / Ketone: Negative  / Bili: Negative / Urobili: Negative   Blood: x / Protein: Trace / Nitrite: Negative   Leuk Esterase: Large / RBC: 5 /hpf / WBC 58 /hpf   Sq Epi: x / Non Sq Epi: 1 /hpf / Bacteria: Moderate        RADIOLOGY & ADDITIONAL TESTS:    Imaging Personally Reviewed:  [x] YES  [ ] NO    Consultant(s) Notes Reviewed:  [x] YES  [ ] NO      MEDICATIONS  (STANDING):  cholecalciferol 1000 Unit(s) Oral daily  sodium chloride 0.9%. 1000 milliLiter(s) (80 mL/Hr) IV Continuous <Continuous>    MEDICATIONS  (PRN):  acetaminophen   Tablet .. 650 milliGRAM(s) Oral every 6 hours PRN Moderate Pain (4 - 6)      Care Discussed with Consultants/Other Providers [x] YES  [ ] NO    HEALTH ISSUES - PROBLEM Dx:  COPD (chronic obstructive pulmonary disease): COPD (chronic obstructive pulmonary disease)  Nutrition, metabolism, and development symptoms: Nutrition, metabolism, and development symptoms  Need for prophylactic measure: Need for prophylactic measure  KRYSTLE (acute kidney injury): KRYSTLE (acute kidney injury)  Acute midline thoracic back pain: Acute midline thoracic back pain  DVT (deep venous thrombosis): DVT (deep venous thrombosis)  Respiratory failure with hypoxia: Respiratory failure with hypoxia  Normal pressure hydrocephalus: Normal pressure hydrocephalus

## 2018-12-22 LAB
APPEARANCE UR: ABNORMAL
BILIRUB UR-MCNC: NEGATIVE — SIGNIFICANT CHANGE UP
COLOR SPEC: YELLOW — SIGNIFICANT CHANGE UP
CULTURE RESULTS: SIGNIFICANT CHANGE UP
DIFF PNL FLD: ABNORMAL
GLUCOSE UR QL: NEGATIVE — SIGNIFICANT CHANGE UP
INR BLD: 2.11 RATIO — HIGH (ref 0.88–1.16)
KETONES UR-MCNC: NEGATIVE — SIGNIFICANT CHANGE UP
LEUKOCYTE ESTERASE UR-ACNC: ABNORMAL
NITRITE UR-MCNC: NEGATIVE — SIGNIFICANT CHANGE UP
PH UR: 6 — SIGNIFICANT CHANGE UP (ref 5–8)
PROT UR-MCNC: ABNORMAL
PROTHROM AB SERPL-ACNC: 24.6 SEC — HIGH (ref 10–13.1)
SP GR SPEC: 1.01 — SIGNIFICANT CHANGE UP (ref 1.01–1.02)
SPECIMEN SOURCE: SIGNIFICANT CHANGE UP
UROBILINOGEN FLD QL: NEGATIVE — SIGNIFICANT CHANGE UP

## 2018-12-22 RX ORDER — CEFTRIAXONE 500 MG/1
INJECTION, POWDER, FOR SOLUTION INTRAMUSCULAR; INTRAVENOUS
Qty: 0 | Refills: 0 | Status: DISCONTINUED | OUTPATIENT
Start: 2018-12-22 | End: 2018-12-24

## 2018-12-22 RX ORDER — CEFTRIAXONE 500 MG/1
1 INJECTION, POWDER, FOR SOLUTION INTRAMUSCULAR; INTRAVENOUS ONCE
Qty: 0 | Refills: 0 | Status: COMPLETED | OUTPATIENT
Start: 2018-12-22 | End: 2018-12-22

## 2018-12-22 RX ORDER — WARFARIN SODIUM 2.5 MG/1
9 TABLET ORAL ONCE
Qty: 0 | Refills: 0 | Status: COMPLETED | OUTPATIENT
Start: 2018-12-22 | End: 2018-12-22

## 2018-12-22 RX ORDER — CEFTRIAXONE 500 MG/1
1 INJECTION, POWDER, FOR SOLUTION INTRAMUSCULAR; INTRAVENOUS EVERY 24 HOURS
Qty: 0 | Refills: 0 | Status: DISCONTINUED | OUTPATIENT
Start: 2018-12-23 | End: 2018-12-24

## 2018-12-22 RX ADMIN — SODIUM CHLORIDE 80 MILLILITER(S): 9 INJECTION INTRAMUSCULAR; INTRAVENOUS; SUBCUTANEOUS at 12:23

## 2018-12-22 RX ADMIN — Medication 1000 UNIT(S): at 12:22

## 2018-12-22 RX ADMIN — CEFTRIAXONE 100 GRAM(S): 500 INJECTION, POWDER, FOR SOLUTION INTRAMUSCULAR; INTRAVENOUS at 23:16

## 2018-12-22 RX ADMIN — WARFARIN SODIUM 9 MILLIGRAM(S): 2.5 TABLET ORAL at 21:15

## 2018-12-22 NOTE — PHYSICAL THERAPY INITIAL EVALUATION ADULT - PERTINENT HX OF CURRENT PROBLEM, REHAB EVAL
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 found to have NPH on head CT.

## 2018-12-22 NOTE — PHYSICAL THERAPY INITIAL EVALUATION ADULT - ADDITIONAL COMMENTS
Pt. lives in 2 family house upstairs and daughter lives down stairs. >10 steps to get in. No HHA services. Ambulates with standard cane out door. Ambulates indoor without AD. Has RW , shower chair and grab bars at home.

## 2018-12-22 NOTE — PHYSICAL THERAPY INITIAL EVALUATION ADULT - TRANSFER TRAINING, PT EVAL
GOAL: Pt will perform sit to/from stand transfers independently with standard cane    within 2-3 weeks.

## 2018-12-22 NOTE — PHYSICAL THERAPY INITIAL EVALUATION ADULT - GAIT TRAINING, PT EVAL
GOAL: Pt will ambulate 150' independently with standard cane in 2-3 wks with good balance and safety awareness..

## 2018-12-22 NOTE — CONSULT NOTE ADULT - SUBJECTIVE AND OBJECTIVE BOX
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   Pt followed in our office by Dr. Rosenstein, last seen in . Had MRI brain at that time with moderate atrophy with proportionate ventriculomegaly. Also CT head during February admission 2018 with ventriculomegaly ex vacuo. At baseline pt walks with walker. Lives in 2 family home with daughter. Pt had 2 falls in past 2 days, latter one yesterday out of bed when she went to grab something and did not use her cane or walker. Fell backwards hit head, denies any syncope. Says she lost her balance but not entirely clear why she fell. Fall was unwitnessed. Found on floor by son and daughter. PMD referred to ED. CT head no acute changes. Ventricular size stable. CT TLS spine no trauma, fracture. INR was 1.89, today 2.11. Daughter Barrington reports she has been having some worsening short term memory over the past year. Also getting hearing aides.    Pt found to have + RVP, corona virus.     Allergies  codeine (Fever; Rash)  contrast media (iodine-based) (Other)  Intolerances    MEDICATIONS  (STANDING):  cholecalciferol 1000 Unit(s) Oral daily  sodium chloride 0.9%. 1000 milliLiter(s) (80 mL/Hr) IV Continuous <Continuous>  MEDICATIONS  (PRN):  acetaminophen   Tablet .. 650 milliGRAM(s) Oral every 6 hours PRN Moderate Pain (4 - 6)      PAST MEDICAL & SURGICAL HISTORY:  Breast cancer  Lung cancer, left  Diabetes mellitus: diet controlled diabetes  Afib  S/P colectomy  Status post left hip replacement  S/P lobectomy of lung: s/p LLL Lobectomy 2014 for lung CA    Social: No toxic habits  FAMILY HISTORY:  Family history of lung cancer (Sibling)  Family history of secondary cancer of bone and bone marrow  Family history of dementia    Advanced care directives reviewed and in chart    Vital Signs Last 24 Hrs  T(C): 36.6 (22 Dec 2018 04:00), Max: 36.9 (21 Dec 2018 11:45)  T(F): 97.9 (22 Dec 2018 04:00), Max: 98.4 (21 Dec 2018 11:45)  HR: 65 (22 Dec 2018 04:00) (65 - 76)  BP: 146/82 (22 Dec 2018 04:00) (136/76 - 146/82)  BP(mean): --  RR: 18 (22 Dec 2018 04:00) (18 - 18)  SpO2: 96% (22 Dec 2018 04:00) (94% - 98%)    NEUROLOGICAL EXAM:    Mental status: Awake, alert, and in no apparent distress. Oriented to self, hospital. Speech clear and fluent, follows commands well.    Cranial Nerves: Pupils were equal, round, reactive to light. Extraocular movements were intact. B TT. Fundoscopic exam was deferred. Facial sensation was intact to light touch. There was no facial asymmetry. The palate was upgoing symmetrically and tongue was midline. Hearing acuity was intact to finger rub AU. Shoulder shrug was full bilaterally    Motor exam: Bulk and tone were normal. Strength was 5/5 in all four extremities. Fine finger movements were symmetric and normal. There was no pronator drift    Reflexes: 1 in the bilateral upper extremities. 1 in the bilateral lower extremities. Toes were downgoing bilaterally.     Sensation: Intact to light touch, temperature, proprioception.     Coordination: Finger-nose-finger without dysmetria.     Gait: deferred    < from: CT Head No Cont (18 @ 14:50) >    EXAM:  CT BRAIN                          PROCEDURE DATE:  2018      INTERPRETATION:  INDICATIONS:  fall on AC yesterday , frequent falls    TECHNIQUE:  Serial axial images were obtained from the skull base to the   vertex without intravenous contrast. Coronal and sagittal reformatted   images were obtained.    COMPARISON EXAMINATION: CT head 2018    FINDINGS:    In comparison to prior, redemonstration of  a prominent bifrontal   diameter of 4.9 cm and prominent third ventriclewith a transverse   measurement of 1.1 cm, fourth ventricle is unremarkable and midline.   There is redemonstration enlargement of the sulci in the lower bifrontal   temporal regions with minimal sulcal effacement at the vertex, this can   be seen with normal pressure hydrocephalus. There is nonspecific white   matter decreased attenuation, likely  microvascular disease, with remote   and age indeterminate lacunar infarction. There is no acute new   intraventricular or extra hemorrhage or midlineshift. The basal cisterns   are patent.    The pituitary is unremarkable, there is atherosclerotic vascular   calcification the carotid siphons.    There are absent orbital lenses with previous cataract surgery. There is   mucosal thickening with retention cysts or polyps in the maxillary,   ethmoid, sphenoid, and frontal sinuses. 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 Tympanic mastoid cavities demonstrate minimal   scattered  opacification. Calvarium remains intact and unchanged.    IMPRESSION:    Enlarged lateral and third ventricles, with slight sulcal effacement near   the vertex, this can be seen with normal pressure hydrocephalus,   microvascular disease with remote and age-indeterminate lacunar infarcts,   there is no acute hemorrhage or midline shift.    SON MORAN M.D., RADIOLOGY RESIDENT  This document has been electronically signed.  CHUCK MESA M.D., ATTENDING RADIOLOGIST  This document has been electronically signed. Dec 20 2018  3:28PM    < from: CT Thoracic Spine Reform No Cont (18 @ 16:47) >    EXAM:  CT REFORM SPINE T                          PROCEDURE DATE:  2018      INTERPRETATION:  Clinical indication: Point tenderness to thoracic spine    Serial thin sections on a multi slice scanner were obtained through the   thoracic spine from the C7 to the L1-2 level in a stacked axial fashion   reformatted at 1.25 mm sections with sagittal and coronal computer   generated reconstructed views.    Images were returned reconstructed from a chest CT.    The vertebral bodies are normal in height and density. Small right-sided   osteophytes are identified from T7 through T 11. No acute fractures or   dislocations are identified. There is mild dextroscoliosis. Spinal soft   tissues are unremarkable.      The dental note is made of splenic artery calcification.     IMPRESSION: Mild degenerative changes of the thoracic spine. No acute   fractures or dislocations.    JACQUIE DUONG M.D., ATTENDING RADIOLOGIST  This document has been electronically signed. Dec 20 2018  5:05PM  < from: CT Lumbar Spine No Cont (18 @ 17:41) >  EXAM:  CT LUMBAR SPINE                          PROCEDURE DATE:  2018      NTERPRETATION:  INDICATIONS:  Status post fall now with back pain and   point tenderness over coccyx.      TECHNIQUE:  Serial axial images were obtained usingmulti slice helical   technique. The inferior margin of the imaging volume is the mid to lower   sacrum. The coccyx is not included. Sagittal and coronal reformatted   images were performed.    COMPARISON EXAMINATION: 2018      FINDINGS:    VERTEBRAL BODIES AND DISCS:  Vertebral bodies are normal in height.   Multilevel marginal osteophyte formation is seen. Diffuse osteopenia.   Discogenic endplate changes at the L5-S1 level. No fracture.  ALIGNMENT:  Scoliosis convex to the left with the apex at the L2 level.  L1-L2 LEVEL:  Mild diffuse disc bulge/ridge.  L2-L3 LEVEL:  Mild disc bulge/ridge with facet arthrosis producing mild   spinal stenosis and mild bilateral foraminal stenosis.  L3-L4 LEVEL:  Mild disc bulge with facet arthrosis. Mild bilateral   foraminal narrowing.  L4-L5 LEVEL:  Diffuse disc bulge with facet arthrosis producing mild   spinal stenosis. Moderate left foraminal stenosis.  L5-S1 LEVEL:  Diffuse disc bulge/ridge with bilateral facet arthrosis   producing mild spinal stenosis. Left foraminal stenosis.  SPINAL CANAL:  No other intradural or extradural defects are seen.   MISCELLANEOUS:  None.    IMPRESSION:  No fracture. Clinical correlation will determine the need   for x-ray or CT of the lower sacrum/coccyx to evaluate this area.    Multilevel spondylosis with mild spinal stenosis and foraminal narrowing.    Scoliosis convex to the left.    FADUMO RAMIRES M.D., ATTENDING RADIOLOGIST  This document has been electronically signed. Dec 21 2018 10:37AM    Labs:  CBC Full  -  ( 20 Dec 2018 14:09 )  WBC Count : 7.1 K/uL  Hemoglobin : 14.0 g/dL  Hematocrit : 41.2 %  Platelet Count - Automated : 166 K/uL  Mean Cell Volume : 89.1 fl  Mean Cell Hemoglobin : 30.3 pg  Mean Cell Hemoglobin Concentration : 34.1 gm/dL  Auto Neutrophil # : 4.8 K/uL  Auto Lymphocyte # : 1.4 K/uL  Auto Monocyte # : 0.8 K/uL  Auto Eosinophil # : 0.0 K/uL  Auto Basophil # : 0.0 K/uL  Auto Neutrophil % : 68.0 %  Auto Lymphocyte % : 20.4 %  Auto Monocyte % : 11.3 %  Auto Eosinophil % : 0.1 %  Auto Basophil % : 0.1 %    1220    142  |  105  |  26<H>  ----------------------------<  144<H>  3.5   |  24  |  1.05    Ca    9.7      20 Dec 2018 14:09    TPro  7.5  /  Alb  4.1  /  TBili  0.8  /  DBili  x   /  AST  12  /  ALT  7<L>  /  AlkPhos  76  12-20    LIVER FUNCTIONS - ( 20 Dec 2018 14:09 )  Alb: 4.1 g/dL / Pro: 7.5 g/dL / ALK PHOS: 76 U/L / ALT: 7 U/L / AST: 12 U/L / GGT: x           PT/INR - ( 22 Dec 2018 08:43 )   PT: 24.6 sec;   INR: 2.11 ratio         PTT - ( 20 Dec 2018 14:09 )  PTT:36.9 sec  Urinalysis Basic - ( 21 Dec 2018 07:26 )    Color: Light Yellow / Appearance: Clear / S.013 / pH: x  Gluc: x / Ketone: Negative  / Bili: Negative / Urobili: Negative   Blood: x / Protein: Trace / Nitrite: Negative   Leuk Esterase: Large / RBC: 5 /hpf / WBC 58 /hpf   Sq Epi: x / Non Sq Epi: 1 /hpf / Bacteria: Moderate HPI:  83 year old woman with pmhx lung cancer s/p resection in remission, melanoma s/p resection, COPD, Afib, 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   Pt followed in our office by Dr. Rosenstein, last seen in . Had MRI brain at that time with moderate atrophy with proportionate ventriculomegaly. Also CT head during February admission 2018 with ventriculomegaly ex vacuo. At baseline pt walks with walker. Lives in 2 family home with daughter. Pt had 2 falls in past 2 days, latter one yesterday out of bed when she went to grab something and did not use her cane or walker. Fell backwards hit head, denies any syncope. Says she lost her balance but not entirely clear why she fell. Fall was unwitnessed. Found on floor by son and daughter. PMD referred to ED. CT head no acute changes. Ventricular size stable. CT TLS spine no trauma, fracture. INR was 1.89, today 2.11. Daughter Barrington reports she has been having some worsening short term memory over the past year. Also getting hearing aides.    Pt found to have + RVP, corona virus.     Allergies  codeine (Fever; Rash)  contrast media (iodine-based) (Other)  Intolerances    MEDICATIONS  (STANDING):  cholecalciferol 1000 Unit(s) Oral daily  sodium chloride 0.9%. 1000 milliLiter(s) (80 mL/Hr) IV Continuous <Continuous>  MEDICATIONS  (PRN):  acetaminophen   Tablet .. 650 milliGRAM(s) Oral every 6 hours PRN Moderate Pain (4 - 6)    Review of Systems: as per chart    PAST MEDICAL & SURGICAL HISTORY:  Breast cancer  Lung cancer, left  Diabetes mellitus: diet controlled diabetes  Afib  S/P colectomy  Status post left hip replacement  S/P lobectomy of lung: s/p LLL Lobectomy 2014 for lung CA    Social: No toxic habits  FAMILY HISTORY:  Family history of lung cancer (Sibling)  Family history of secondary cancer of bone and bone marrow  Family history of dementia    Advanced care directives reviewed and in chart    Vital Signs Last 24 Hrs  T(C): 36.6 (22 Dec 2018 04:00), Max: 36.9 (21 Dec 2018 11:45)  T(F): 97.9 (22 Dec 2018 04:00), Max: 98.4 (21 Dec 2018 11:45)  HR: 65 (22 Dec 2018 04:00) (65 - 76)  BP: 146/82 (22 Dec 2018 04:00) (136/76 - 146/82)  BP(mean): --  RR: 18 (22 Dec 2018 04:00) (18 - 18)  SpO2: 96% (22 Dec 2018 04:00) (94% - 98%)    NEUROLOGICAL EXAM:    Mental status: Awake, alert, and in no apparent distress. Oriented to self, hospital. Speech clear and fluent, follows commands well.    Cranial Nerves: Pupils were equal, round, reactive to light. Extraocular movements were intact. B TT. Fundoscopic exam was deferred. Facial sensation was intact to light touch. There was no facial asymmetry. The palate was upgoing symmetrically and tongue was midline. Hearing acuity was intact to finger rub AU. Shoulder shrug was full bilaterally    Motor exam: Bulk and tone were normal. Strength was 5/5 in all four extremities. Fine finger movements were symmetric and normal. There was no pronator drift    Reflexes: 1 in the bilateral upper extremities. 1 in the bilateral lower extremities. Toes were downgoing bilaterally.     Sensation: Intact to light touch, temperature, proprioception.     Coordination: Finger-nose-finger without dysmetria.     Gait: deferred    < from: CT Head No Cont (18 @ 14:50) >    EXAM:  CT BRAIN                          PROCEDURE DATE:  2018      INTERPRETATION:  INDICATIONS:  fall on AC yesterday , frequent falls    TECHNIQUE:  Serial axial images were obtained from the skull base to the   vertex without intravenous contrast. Coronal and sagittal reformatted   images were obtained.    COMPARISON EXAMINATION: CT head 2018    FINDINGS:    In comparison to prior, redemonstration of  a prominent bifrontal   diameter of 4.9 cm and prominent third ventriclewith a transverse   measurement of 1.1 cm, fourth ventricle is unremarkable and midline.   There is redemonstration enlargement of the sulci in the lower bifrontal   temporal regions with minimal sulcal effacement at the vertex, this can   be seen with normal pressure hydrocephalus. There is nonspecific white   matter decreased attenuation, likely  microvascular disease, with remote   and age indeterminate lacunar infarction. There is no acute new   intraventricular or extra hemorrhage or midlineshift. The basal cisterns   are patent.    The pituitary is unremarkable, there is atherosclerotic vascular   calcification the carotid siphons.    There are absent orbital lenses with previous cataract surgery. There is   mucosal thickening with retention cysts or polyps in the maxillary,   ethmoid, sphenoid, and frontal sinuses. 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 Tympanic mastoid cavities demonstrate minimal   scattered  opacification. Calvarium remains intact and unchanged.    IMPRESSION:    Enlarged lateral and third ventricles, with slight sulcal effacement near   the vertex, this can be seen with normal pressure hydrocephalus,   microvascular disease with remote and age-indeterminate lacunar infarcts,   there is no acute hemorrhage or midline shift.    SON MORAN M.D., RADIOLOGY RESIDENT  This document has been electronically signed.  CHUCK MESA M.D., ATTENDING RADIOLOGIST  This document has been electronically signed. Dec 20 2018  3:28PM    < from: CT Thoracic Spine Reform No Cont (18 @ 16:47) >    EXAM:  CT REFORM SPINE T                          PROCEDURE DATE:  2018      INTERPRETATION:  Clinical indication: Point tenderness to thoracic spine    Serial thin sections on a multi slice scanner were obtained through the   thoracic spine from the C7 to the L1-2 level in a stacked axial fashion   reformatted at 1.25 mm sections with sagittal and coronal computer   generated reconstructed views.    Images were returned reconstructed from a chest CT.    The vertebral bodies are normal in height and density. Small right-sided   osteophytes are identified from T7 through T 11. No acute fractures or   dislocations are identified. There is mild dextroscoliosis. Spinal soft   tissues are unremarkable.      The dental note is made of splenic artery calcification.     IMPRESSION: Mild degenerative changes of the thoracic spine. No acute   fractures or dislocations.    JACQUIE DUONG M.D., ATTENDING RADIOLOGIST  This document has been electronically signed. Dec 20 2018  5:05PM  < from: CT Lumbar Spine No Cont (18 @ 17:41) >  EXAM:  CT LUMBAR SPINE                          PROCEDURE DATE:  2018      NTERPRETATION:  INDICATIONS:  Status post fall now with back pain and   point tenderness over coccyx.      TECHNIQUE:  Serial axial images were obtained usingmulti slice helical   technique. The inferior margin of the imaging volume is the mid to lower   sacrum. The coccyx is not included. Sagittal and coronal reformatted   images were performed.    COMPARISON EXAMINATION: 2018      FINDINGS:    VERTEBRAL BODIES AND DISCS:  Vertebral bodies are normal in height.   Multilevel marginal osteophyte formation is seen. Diffuse osteopenia.   Discogenic endplate changes at the L5-S1 level. No fracture.  ALIGNMENT:  Scoliosis convex to the left with the apex at the L2 level.  L1-L2 LEVEL:  Mild diffuse disc bulge/ridge.  L2-L3 LEVEL:  Mild disc bulge/ridge with facet arthrosis producing mild   spinal stenosis and mild bilateral foraminal stenosis.  L3-L4 LEVEL:  Mild disc bulge with facet arthrosis. Mild bilateral   foraminal narrowing.  L4-L5 LEVEL:  Diffuse disc bulge with facet arthrosis producing mild   spinal stenosis. Moderate left foraminal stenosis.  L5-S1 LEVEL:  Diffuse disc bulge/ridge with bilateral facet arthrosis   producing mild spinal stenosis. Left foraminal stenosis.  SPINAL CANAL:  No other intradural or extradural defects are seen.   MISCELLANEOUS:  None.    IMPRESSION:  No fracture. Clinical correlation will determine the need   for x-ray or CT of the lower sacrum/coccyx to evaluate this area.    Multilevel spondylosis with mild spinal stenosis and foraminal narrowing.    Scoliosis convex to the left.    FADUMO RAMIRES M.D., ATTENDING RADIOLOGIST  This document has been electronically signed. Dec 21 2018 10:37AM    Labs:  CBC Full  -  ( 20 Dec 2018 14:09 )  WBC Count : 7.1 K/uL  Hemoglobin : 14.0 g/dL  Hematocrit : 41.2 %  Platelet Count - Automated : 166 K/uL  Mean Cell Volume : 89.1 fl  Mean Cell Hemoglobin : 30.3 pg  Mean Cell Hemoglobin Concentration : 34.1 gm/dL  Auto Neutrophil # : 4.8 K/uL  Auto Lymphocyte # : 1.4 K/uL  Auto Monocyte # : 0.8 K/uL  Auto Eosinophil # : 0.0 K/uL  Auto Basophil # : 0.0 K/uL  Auto Neutrophil % : 68.0 %  Auto Lymphocyte % : 20.4 %  Auto Monocyte % : 11.3 %  Auto Eosinophil % : 0.1 %  Auto Basophil % : 0.1 %    1220    142  |  105  |  26<H>  ----------------------------<  144<H>  3.5   |  24  |  1.05    Ca    9.7      20 Dec 2018 14:09    TPro  7.5  /  Alb  4.1  /  TBili  0.8  /  DBili  x   /  AST  12  /  ALT  7<L>  /  AlkPhos  76  12-20    LIVER FUNCTIONS - ( 20 Dec 2018 14:09 )  Alb: 4.1 g/dL / Pro: 7.5 g/dL / ALK PHOS: 76 U/L / ALT: 7 U/L / AST: 12 U/L / GGT: x           PT/INR - ( 22 Dec 2018 08:43 )   PT: 24.6 sec;   INR: 2.11 ratio         PTT - ( 20 Dec 2018 14:09 )  PTT:36.9 sec  Urinalysis Basic - ( 21 Dec 2018 07:26 )    Color: Light Yellow / Appearance: Clear / S.013 / pH: x  Gluc: x / Ketone: Negative  / Bili: Negative / Urobili: Negative   Blood: x / Protein: Trace / Nitrite: Negative   Leuk Esterase: Large / RBC: 5 /hpf / WBC 58 /hpf   Sq Epi: x / Non Sq Epi: 1 /hpf / Bacteria: Moderate

## 2018-12-22 NOTE — CHART NOTE - NSCHARTNOTEFT_GEN_A_CORE
Informed by day NP, Zeta to f/u repeat UA for possible UTI, as per Dr. Will to start Ceftriaxone if the repeat UA appears positive. Urine culture was already sent, pending results. Pt is a 83F PMHx of Lung cancer s/p resection (remission), melanoma s/p resection (remission), COPD, DVT x2 on coumadin who presents for frequent falls and worsening memory. CT head with showed enlarged ventricle suggesting along with symptoms NPH. Pt s/p Neuro consult who recommended to r/o UTI.     Urinalysis Basic - ( 22 Dec 2018 20:03 )    Color: Yellow / Appearance: Slightly Turbid / S.015 / pH: x  Gluc: x / Ketone: Negative  / Bili: Negative / Urobili: Negative   Blood: x / Protein: Trace / Nitrite: Negative   Leuk Esterase: Large / RBC: 7 /hpf /  /HPF   Sq Epi: x / Non Sq Epi: 1 / Bacteria: Many        Repeat UA appears again mildly positive  Will start Ceftriaxone as recommended  f/u Urine Culture  f/u with primary team      Emily Crowe NP  x [phone]

## 2018-12-22 NOTE — PHYSICAL THERAPY INITIAL EVALUATION ADULT - STRENGTHENING, PT EVAL
GOAL: Pt will improve b/l  (UE/LE) strength by at least 1/2 MMT grade within 2-3 weeks to assist with performing functional mobility and ADLs.

## 2018-12-22 NOTE — CONSULT NOTE ADULT - ASSESSMENT
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   Pt followed in our office by Dr. Rosenstein, last seen in 2017. Had MRI brain at that time with moderate atrophy with proportionate ventriculomegaly. Also CT head during February admission 2/2018 with ventriculomegaly ex vacuo. At baseline pt walks with walker. Lives in 2 family home with daughter. Pt had 2 falls in past 2 days, latter one yesterday out of bed when she went to grab something and did not use her cane or walker. Fell backwards hit head, denies any syncope. Says she lost her balance but not entirely clear why she fell. Fall was unwitnessed. Found on floor by son and daughter. PMD referred to ED. CT head no acute changes. Ventricular size stable. CT TLS spine no trauma, fracture. INR was 1.89, today 2.11. Daughter Barrington reports she has been having some worsening short term memory over the past year. Also getting hearing aides.    Pt found to have + RVP, corona virus.     A/P: Suspect falls secondary to underlining multifactorial gait disorder (cognitive impairment, neuropathy, osteoarthritis) decompensated in setting of +respiratory virus.   INR was slightly subtherapeutic 1.89 but doubt acute neurological event such as TIA and today therapeutic at 2.11.  Clinically this is not NPH and would not recommend further work up for this.   Discussed at length with daughter Barrington by phone who agrees.     Plan  1. Supportive care for +RVP  2. PT gait eval, favor rehab  3. Fall precautions  4. Frequent reorientation, at risk for hospital delirium  5. Continue AC for stroke prophylaxis  No further inpatient neuro testing at this time.   Will follow  Reviewed plan with medicine NP. 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   Pt followed in our office by Dr. Rosenstein, last seen in 2017. Had MRI brain at that time with moderate atrophy with proportionate ventriculomegaly. Also CT head during February admission 2/2018 with ventriculomegaly ex vacuo. At baseline pt walks with walker. Lives in 2 family home with daughter. Pt had 2 falls in past 2 days, latter one yesterday out of bed when she went to grab something and did not use her cane or walker. Fell backwards hit head, denies any syncope. Says she lost her balance but not entirely clear why she fell. Fall was unwitnessed. Found on floor by son and daughter. PMD referred to ED. CT head no acute changes. Ventricular size stable. CT TLS spine no trauma, fracture. INR was 1.89, today 2.11. Daughter Barrington reports she has been having some worsening short term memory over the past year. Also getting hearing aides.    Pt found to have + RVP, corona virus.     A/P: Suspect falls secondary to underlining multifactorial gait disorder (cognitive impairment, neuropathy, osteoarthritis) decompensated in setting of +respiratory virus, +UA.   INR was slightly subtherapeutic 1.89 but doubt acute neurological event such as TIA and today therapeutic at 2.11.  Clinically this is not NPH and would not recommend further work up for this.   Discussed at length with daughter Barrington by phone who agrees.     Plan  1. Supportive care for +RVP, r/o UTI- send Ucx  2. PT gait eval, favor rehab  3. Fall precautions  4. Frequent reorientation, at risk for hospital delirium  5. Continue AC for stroke prophylaxis  No further inpatient neuro testing at this time.   Will follow  Reviewed plan with medicine NP.

## 2018-12-22 NOTE — PROGRESS NOTE ADULT - PROBLEM SELECTOR PLAN 1
Pt with more frequent falls, steady decline in gait now having to use walker- feels unsteady no dizziness, LOC, syncope or vertigo symptoms  Has urinary incontinence and slowly progressing memory deficits  CT head shows enlarged ventricles suggesting along with symptoms NPH  Pt sees Dr. Gongora (). neuro eval appreciated.  doubt NPH. no need for further work up.   c/w Vitamin D3 (home med)  checking UA and urine culture. if positive, will treat with brief course of Ceftriaxone IV x 3-5 days. Pt with more frequent falls, steady decline in gait now having to use walker- feels unsteady no dizziness, LOC, syncope or vertigo symptoms  Has urinary incontinence and slowly progressing memory deficits  CT head shows enlarged ventricles suggesting along with symptoms NPH  Pt sees Dr. Gongora (). neuro eval appreciated.  doubt NPH. no need for further work up.   c/w Vitamin D3 (home med)  Initially UA mildly positive. no urinary symptoms. no fever. no leukocytosis.   she has urinary incontinence but this is not new. she had incontinence for years. no urgency, no frequency.  will repeat UA and urine culture. if positive, will treat with brief course of Ceftriaxone IV x 3-5 days. if not, than no treatment needed.

## 2018-12-22 NOTE — PHYSICAL THERAPY INITIAL EVALUATION ADULT - PLANNED THERAPY INTERVENTIONS, PT EVAL
balance training/strengthening/transfer training/GOAL: Pt will negotiate 10 steps  up and down using HR support, independent  within 2-3 weeks./bed mobility training/gait training

## 2018-12-22 NOTE — PROGRESS NOTE ADULT - SUBJECTIVE AND OBJECTIVE BOX
Patient is a 83y old  Female who presents with a chief complaint of r/o NPH (22 Dec 2018 11:01)      SUBJECTIVE / OVERNIGHT EVENTS:  feeling better. generalized pain, back pain.   the daughter Karoline at bedside.   no cp, no sob, no n/v/d. no abdominal pain.  no headache, no dizziness.   wants to go home, but the daughter wants rehab.     Vital Signs Last 24 Hrs  T(C): 37.2 (22 Dec 2018 14:53), Max: 37.2 (22 Dec 2018 14:53)  T(F): 98.9 (22 Dec 2018 14:53), Max: 98.9 (22 Dec 2018 14:53)  HR: 69 (22 Dec 2018 14:53) (65 - 69)  BP: 137/70 (22 Dec 2018 14:53) (137/69 - 146/82)  BP(mean): --  RR: 18 (22 Dec 2018 14:53) (18 - 18)  SpO2: 95% (22 Dec 2018 14:53) (94% - 96%)  I&O's Summary    21 Dec 2018 07:01  -  22 Dec 2018 07:00  --------------------------------------------------------  IN: 1560 mL / OUT: 0 mL / NET: 1560 mL    22 Dec 2018 07:01  -  22 Dec 2018 16:07  --------------------------------------------------------  IN: 1520 mL / OUT: 0 mL / NET: 1520 mL      PHYSICAL EXAM:  GENERAL: NAD, Comfortable, lying in bed, alert awake  HEAD:  Atraumatic, Normocephalic  EYES: EOMI, PERRLA, conjunctiva and sclera clear  NECK: Supple, No JVD  CHEST/LUNG: Clear to auscultation bilaterally; No wheeze  HEART: Regular rate and rhythm; No murmurs, rubs, or gallops  ABDOMEN: Soft, Nontender, Nondistended; Bowel sounds present  BACK: no spine tenderness. no bruise  Neuro: AAO x 3, no focal deficit, 5/5 b/l extremities  EXTREMITIES:  2+ Peripheral Pulses, No clubbing, cyanosis, or edema  SKIN: No rashes or lesions      LABS:          PT/INR - ( 22 Dec 2018 08:43 )   PT: 24.6 sec;   INR: 2.11 ratio           CAPILLARY BLOOD GLUCOSE            Urinalysis Basic - ( 21 Dec 2018 07:26 )    Color: Light Yellow / Appearance: Clear / S.013 / pH: x  Gluc: x / Ketone: Negative  / Bili: Negative / Urobili: Negative   Blood: x / Protein: Trace / Nitrite: Negative   Leuk Esterase: Large / RBC: 5 /hpf / WBC 58 /hpf   Sq Epi: x / Non Sq Epi: 1 /hpf / Bacteria: Moderate        RADIOLOGY & ADDITIONAL TESTS:    Imaging Personally Reviewed:  [x] YES  [ ] NO    Consultant(s) Notes Reviewed:  [x] YES  [ ] NO      MEDICATIONS  (STANDING):  cholecalciferol 1000 Unit(s) Oral daily  sodium chloride 0.9%. 1000 milliLiter(s) (80 mL/Hr) IV Continuous <Continuous>  warfarin 9 milliGRAM(s) Oral once    MEDICATIONS  (PRN):  acetaminophen   Tablet .. 650 milliGRAM(s) Oral every 6 hours PRN Moderate Pain (4 - 6)      Care Discussed with Consultants/Other Providers [x] YES  [ ] NO    HEALTH ISSUES - PROBLEM Dx:  COPD (chronic obstructive pulmonary disease): COPD (chronic obstructive pulmonary disease)  Nutrition, metabolism, and development symptoms: Nutrition, metabolism, and development symptoms  Need for prophylactic measure: Need for prophylactic measure  KRYSTLE (acute kidney injury): KRYSTLE (acute kidney injury)  Acute midline thoracic back pain: Acute midline thoracic back pain  DVT (deep venous thrombosis): DVT (deep venous thrombosis)  Respiratory failure with hypoxia: Respiratory failure with hypoxia  Normal pressure hydrocephalus: Normal pressure hydrocephalus Patient is a 83y old  Female who presents with a chief complaint of r/o NPH (22 Dec 2018 11:01)      SUBJECTIVE / OVERNIGHT EVENTS:  feeling better. generalized pain, back pain.   the daughter Karoline at bedside.   no cp, no sob, no n/v/d. no abdominal pain.  no headache, no dizziness.   wants to go home, but the daughter wants rehab.   she has urinary incontinence but this is not new. she had incontinence for years. no urgency, no frequency.    Vital Signs Last 24 Hrs  T(C): 37.2 (22 Dec 2018 14:53), Max: 37.2 (22 Dec 2018 14:53)  T(F): 98.9 (22 Dec 2018 14:53), Max: 98.9 (22 Dec 2018 14:53)  HR: 69 (22 Dec 2018 14:53) (65 - 69)  BP: 137/70 (22 Dec 2018 14:53) (137/69 - 146/82)  BP(mean): --  RR: 18 (22 Dec 2018 14:53) (18 - 18)  SpO2: 95% (22 Dec 2018 14:53) (94% - 96%)  I&O's Summary    21 Dec 2018 07:01  -  22 Dec 2018 07:00  --------------------------------------------------------  IN: 1560 mL / OUT: 0 mL / NET: 1560 mL    22 Dec 2018 07:01  -  22 Dec 2018 16:07  --------------------------------------------------------  IN: 1520 mL / OUT: 0 mL / NET: 1520 mL      PHYSICAL EXAM:  GENERAL: NAD, Comfortable, lying in bed, alert awake  HEAD:  Atraumatic, Normocephalic  EYES: EOMI, PERRLA, conjunctiva and sclera clear  NECK: Supple, No JVD  CHEST/LUNG: Clear to auscultation bilaterally; No wheeze  HEART: Regular rate and rhythm; No murmurs, rubs, or gallops  ABDOMEN: Soft, Nontender, Nondistended; Bowel sounds present  BACK: no spine tenderness. no bruise  Neuro: AAO x 3, no focal deficit, 5/5 b/l extremities  EXTREMITIES:  2+ Peripheral Pulses, No clubbing, cyanosis, or edema  SKIN: No rashes or lesions      LABS:          PT/INR - ( 22 Dec 2018 08:43 )   PT: 24.6 sec;   INR: 2.11 ratio           CAPILLARY BLOOD GLUCOSE            Urinalysis Basic - ( 21 Dec 2018 07:26 )    Color: Light Yellow / Appearance: Clear / S.013 / pH: x  Gluc: x / Ketone: Negative  / Bili: Negative / Urobili: Negative   Blood: x / Protein: Trace / Nitrite: Negative   Leuk Esterase: Large / RBC: 5 /hpf / WBC 58 /hpf   Sq Epi: x / Non Sq Epi: 1 /hpf / Bacteria: Moderate        RADIOLOGY & ADDITIONAL TESTS:    Imaging Personally Reviewed:  [x] YES  [ ] NO    Consultant(s) Notes Reviewed:  [x] YES  [ ] NO      MEDICATIONS  (STANDING):  cholecalciferol 1000 Unit(s) Oral daily  sodium chloride 0.9%. 1000 milliLiter(s) (80 mL/Hr) IV Continuous <Continuous>  warfarin 9 milliGRAM(s) Oral once    MEDICATIONS  (PRN):  acetaminophen   Tablet .. 650 milliGRAM(s) Oral every 6 hours PRN Moderate Pain (4 - 6)      Care Discussed with Consultants/Other Providers [x] YES  [ ] NO    HEALTH ISSUES - PROBLEM Dx:  COPD (chronic obstructive pulmonary disease): COPD (chronic obstructive pulmonary disease)  Nutrition, metabolism, and development symptoms: Nutrition, metabolism, and development symptoms  Need for prophylactic measure: Need for prophylactic measure  KRYSTLE (acute kidney injury): KRYSTLE (acute kidney injury)  Acute midline thoracic back pain: Acute midline thoracic back pain  DVT (deep venous thrombosis): DVT (deep venous thrombosis)  Respiratory failure with hypoxia: Respiratory failure with hypoxia  Normal pressure hydrocephalus: Normal pressure hydrocephalus

## 2018-12-23 DIAGNOSIS — N39.0 URINARY TRACT INFECTION, SITE NOT SPECIFIED: ICD-10-CM

## 2018-12-23 RX ORDER — WARFARIN SODIUM 2.5 MG/1
9 TABLET ORAL ONCE
Qty: 0 | Refills: 0 | Status: COMPLETED | OUTPATIENT
Start: 2018-12-23 | End: 2018-12-23

## 2018-12-23 RX ADMIN — CEFTRIAXONE 100 GRAM(S): 500 INJECTION, POWDER, FOR SOLUTION INTRAMUSCULAR; INTRAVENOUS at 20:55

## 2018-12-23 RX ADMIN — WARFARIN SODIUM 9 MILLIGRAM(S): 2.5 TABLET ORAL at 21:01

## 2018-12-23 RX ADMIN — Medication 1000 UNIT(S): at 11:52

## 2018-12-23 NOTE — PROGRESS NOTE ADULT - PROBLEM SELECTOR PLAN 1
Pt with more frequent falls, steady decline in gait now having to use walker- feels unsteady no dizziness, LOC, syncope or vertigo symptoms  Has urinary incontinence and slowly progressing memory deficits  CT head shows enlarged ventricles suggesting along with symptoms NPH  Pt sees Dr. Gongora (). neuro eval appreciated.  doubt NPH. no need for further work up.   c/w Vitamin D3 (home med)  Initially UA mildly positive. no urinary symptoms. no fever. no leukocytosis.   she has urinary incontinence but this is not new. she had incontinence for years. no urgency, no frequency.

## 2018-12-23 NOTE — PROGRESS NOTE ADULT - SUBJECTIVE AND OBJECTIVE BOX
Coccygeal tenderness, otherwise doing OK    Vital Signs Last 24 Hrs  T(C): 36.8 (23 Dec 2018 04:31), Max: 37.2 (22 Dec 2018 14:53)  T(F): 98.2 (23 Dec 2018 04:31), Max: 98.9 (22 Dec 2018 14:53)  HR: 68 (23 Dec 2018 09:12) (66 - 74)  BP: 144/74 (23 Dec 2018 09:12) (137/70 - 144/74)  BP(mean): --  RR: 18 (23 Dec 2018 09:12) (18 - 18)  SpO2: 98% (23 Dec 2018 09:12) (95% - 98%)    GENERAL: NAD, Comfortable, lying in bed, alert awake  HEAD:  Atraumatic, Normocephalic  EYES: EOMI, PERRLA, conjunctiva and sclera clear  NECK: Supple, No JVD  CHEST/LUNG: Clear to auscultation bilaterally; No wheeze  HEART: Regular rate and rhythm; No murmurs, rubs, or gallops  ABDOMEN: Soft, Nontender, Nondistended; Bowel sounds present  BACK: no spine tenderness. no bruise  Neuro: AAO x 3, no focal deficit, 5/5 b/l extremities  EXTREMITIES:  2+ Peripheral Pulses, No clubbing, cyanosis, or edema  SKIN: No rashes or lesions    LABS:          PT/INR - ( 22 Dec 2018 08:43 )   PT: 24.6 sec;   INR: 2.11 ratio           CAPILLARY BLOOD GLUCOSE            Urinalysis Basic - ( 22 Dec 2018 20:03 )    Color: Yellow / Appearance: Slightly Turbid / S.015 / pH: x  Gluc: x / Ketone: Negative  / Bili: Negative / Urobili: Negative   Blood: x / Protein: Trace / Nitrite: Negative   Leuk Esterase: Large / RBC: 7 /hpf /  /HPF   Sq Epi: x / Non Sq Epi: 1 / Bacteria: Many

## 2018-12-24 LAB
-  AMPICILLIN: SIGNIFICANT CHANGE UP
-  CIPROFLOXACIN: SIGNIFICANT CHANGE UP
-  LEVOFLOXACIN: SIGNIFICANT CHANGE UP
-  NITROFURANTOIN: SIGNIFICANT CHANGE UP
-  TETRACYCLINE: SIGNIFICANT CHANGE UP
-  VANCOMYCIN: SIGNIFICANT CHANGE UP
ANION GAP SERPL CALC-SCNC: 14 MMOL/L — SIGNIFICANT CHANGE UP (ref 5–17)
BUN SERPL-MCNC: 20 MG/DL — SIGNIFICANT CHANGE UP (ref 7–23)
CALCIUM SERPL-MCNC: 9.1 MG/DL — SIGNIFICANT CHANGE UP (ref 8.4–10.5)
CHLORIDE SERPL-SCNC: 107 MMOL/L — SIGNIFICANT CHANGE UP (ref 96–108)
CO2 SERPL-SCNC: 21 MMOL/L — LOW (ref 22–31)
CREAT SERPL-MCNC: 0.82 MG/DL — SIGNIFICANT CHANGE UP (ref 0.5–1.3)
CULTURE RESULTS: SIGNIFICANT CHANGE UP
GLUCOSE SERPL-MCNC: 146 MG/DL — HIGH (ref 70–99)
HCT VFR BLD CALC: 38.3 % — SIGNIFICANT CHANGE UP (ref 34.5–45)
HGB BLD-MCNC: 12.6 G/DL — SIGNIFICANT CHANGE UP (ref 11.5–15.5)
INR BLD: 2.83 RATIO — HIGH (ref 0.88–1.16)
MCHC RBC-ENTMCNC: 29.3 PG — SIGNIFICANT CHANGE UP (ref 27–34)
MCHC RBC-ENTMCNC: 32.9 GM/DL — SIGNIFICANT CHANGE UP (ref 32–36)
MCV RBC AUTO: 89.1 FL — SIGNIFICANT CHANGE UP (ref 80–100)
METHOD TYPE: SIGNIFICANT CHANGE UP
ORGANISM # SPEC MICROSCOPIC CNT: SIGNIFICANT CHANGE UP
ORGANISM # SPEC MICROSCOPIC CNT: SIGNIFICANT CHANGE UP
PLATELET # BLD AUTO: 166 K/UL — SIGNIFICANT CHANGE UP (ref 150–400)
POTASSIUM SERPL-MCNC: 3.6 MMOL/L — SIGNIFICANT CHANGE UP (ref 3.5–5.3)
POTASSIUM SERPL-SCNC: 3.6 MMOL/L — SIGNIFICANT CHANGE UP (ref 3.5–5.3)
PROTHROM AB SERPL-ACNC: 33.3 SEC — HIGH (ref 10–13.1)
RBC # BLD: 4.3 M/UL — SIGNIFICANT CHANGE UP (ref 3.8–5.2)
RBC # FLD: 14.2 % — SIGNIFICANT CHANGE UP (ref 10.3–14.5)
SODIUM SERPL-SCNC: 142 MMOL/L — SIGNIFICANT CHANGE UP (ref 135–145)
SPECIMEN SOURCE: SIGNIFICANT CHANGE UP
WBC # BLD: 5.68 K/UL — SIGNIFICANT CHANGE UP (ref 3.8–10.5)
WBC # FLD AUTO: 5.68 K/UL — SIGNIFICANT CHANGE UP (ref 3.8–10.5)

## 2018-12-24 RX ORDER — VANCOMYCIN HCL 1 G
1000 VIAL (EA) INTRAVENOUS EVERY 12 HOURS
Qty: 0 | Refills: 0 | Status: DISCONTINUED | OUTPATIENT
Start: 2018-12-24 | End: 2018-12-26

## 2018-12-24 RX ORDER — WARFARIN SODIUM 2.5 MG/1
8 TABLET ORAL ONCE
Qty: 0 | Refills: 0 | Status: COMPLETED | OUTPATIENT
Start: 2018-12-24 | End: 2018-12-24

## 2018-12-24 RX ADMIN — Medication 250 MILLIGRAM(S): at 14:26

## 2018-12-24 RX ADMIN — Medication 1000 UNIT(S): at 11:37

## 2018-12-24 RX ADMIN — WARFARIN SODIUM 8 MILLIGRAM(S): 2.5 TABLET ORAL at 21:47

## 2018-12-24 NOTE — PROGRESS NOTE ADULT - SUBJECTIVE AND OBJECTIVE BOX
Neurology follow up  Subjective:  No new complaints  on Abx for UTI    Medications:  acetaminophen   Tablet .. 650 milliGRAM(s) Oral every 6 hours PRN  cefTRIAXone   IVPB      cefTRIAXone   IVPB 1 Gram(s) IV Intermittent every 24 hours  cholecalciferol 1000 Unit(s) Oral daily    Labs:  CBC Full  -  ( 24 Dec 2018 07:51 )  WBC Count : 5.68 K/uL  Hemoglobin : 12.6 g/dL  Hematocrit : 38.3 %  Platelet Count - Automated : 166 K/uL  Mean Cell Volume : 89.1 fl  Mean Cell Hemoglobin : 29.3 pg  Mean Cell Hemoglobin Concentration : 32.9 gm/dL  Auto Neutrophil # : x  Auto Lymphocyte # : x  Auto Monocyte # : x  Auto Eosinophil # : x  Auto Basophil # : x  Auto Neutrophil % : x  Auto Lymphocyte % : x  Auto Monocyte % : x  Auto Eosinophil % : x  Auto Basophil % : x    12-24    142  |  107  |  20  ----------------------------<  146<H>  3.6   |  21<L>  |  0.82    Ca    9.1      24 Dec 2018 06:18    PT/INR - ( 24 Dec 2018 08:15 )   PT: 33.3 sec;   INR: 2.83 ratio    Urinalysis Basic - ( 22 Dec 2018 20:03 )    Color: Yellow / Appearance: Slightly Turbid / S.015 / pH: x  Gluc: x / Ketone: Negative  / Bili: Negative / Urobili: Negative   Blood: x / Protein: Trace / Nitrite: Negative   Leuk Esterase: Large / RBC: 7 /hpf /  /HPF   Sq Epi: x / Non Sq Epi: 1 / Bacteria: Many    Vitals:  Vital Signs Last 24 Hrs  T(C): 36.8 (24 Dec 2018 11:36), Max: 36.9 (23 Dec 2018 19:45)  T(F): 98.2 (24 Dec 2018 11:36), Max: 98.4 (23 Dec 2018 19:45)  HR: 67 (24 Dec 2018 11:36) (64 - 67)  BP: 130/77 (24 Dec 2018 11:36) (128/68 - 130/77)  BP(mean): --  RR: 18 (24 Dec 2018 11:36) (18 - 18)  SpO2: 96% (23 Dec 2018 19:45) (96% - 96%)    NEUROLOGICAL EXAM:    Mental status: Awake, alert, and in no apparent distress. Oriented to self, hospital. Speech clear and fluent, follows commands well.    Cranial Nerves: Pupils were equal, round, reactive to light. Extraocular movements were intact. B TT. Fundoscopic exam was deferred. Facial sensation was intact to light touch. There was no facial asymmetry. The palate was upgoing symmetrically and tongue was midline. Hearing acuity was intact to finger rub AU. Shoulder shrug was full bilaterally    Motor exam: Bulk and tone were normal. Strength was 5/5 in all four extremities. Fine finger movements were symmetric and normal. There was no pronator drift    Reflexes: 1 in the bilateral upper extremities. 1 in the bilateral lower extremities. Toes were downgoing bilaterally.     Sensation: Intact to light touch.     Coordination: Finger-nose-finger without dysmetria.     Gait: deferred    < from: CT Head No Cont (18 @ 14:50) >    EXAM:  CT BRAIN                          PROCEDURE DATE:  2018      INTERPRETATION:  INDICATIONS:  fall on AC yesterday , frequent falls    TECHNIQUE:  Serial axial images were obtained from the skull base to the   vertex without intravenous contrast. Coronal and sagittal reformatted   images were obtained.    COMPARISON EXAMINATION: CT head 2018    FINDINGS:    In comparison to prior, redemonstration of  a prominent bifrontal   diameter of 4.9 cm and prominent third ventriclewith a transverse   measurement of 1.1 cm, fourth ventricle is unremarkable and midline.   There is redemonstration enlargement of the sulci in the lower bifrontal   temporal regions with minimal sulcal effacement at the vertex, this can   be seen with normal pressure hydrocephalus. There is nonspecific white   matter decreased attenuation, likely  microvascular disease, with remote   and age indeterminate lacunar infarction. There is no acute new   intraventricular or extra hemorrhage or midlineshift. The basal cisterns   are patent.    The pituitary is unremarkable, there is atherosclerotic vascular   calcification the carotid siphons.    There are absent orbital lenses with previous cataract surgery. There is   mucosal thickening with retention cysts or polyps in the maxillary,   ethmoid, sphenoid, and frontal sinuses. 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 Tympanic mastoid cavities demonstrate minimal   scattered  opacification. Calvarium remains intact and unchanged.    IMPRESSION:    Enlarged lateral and third ventricles, with slight sulcal effacement near   the vertex, this can be seen with normal pressure hydrocephalus,   microvascular disease with remote and age-indeterminate lacunar infarcts,   there is no acute hemorrhage or midline shift.    SON MORAN M.D., RADIOLOGY RESIDENT  This document has been electronically signed.  CHUCK MESA M.D., ATTENDING RADIOLOGIST  This document has been electronically signed. Dec 20 2018  3:28PM    < from: CT Thoracic Spine Reform No Cont (18 @ 16:47) >    EXAM:  CT REFORM SPINE T                          PROCEDURE DATE:  2018      INTERPRETATION:  Clinical indication: Point tenderness to thoracic spine    Serial thin sections on a multi slice scanner were obtained through the   thoracic spine from the C7 to the L1-2 level in a stacked axial fashion   reformatted at 1.25 mm sections with sagittal and coronal computer   generated reconstructed views.    Images were returned reconstructed from a chest CT.    The vertebral bodies are normal in height and density. Small right-sided   osteophytes are identified from T7 through T 11. No acute fractures or   dislocations are identified. There is mild dextroscoliosis. Spinal soft   tissues are unremarkable.      The dental note is made of splenic artery calcification.     IMPRESSION: Mild degenerative changes of the thoracic spine. No acute   fractures or dislocations.    JACQUIE DUONG M.D., ATTENDING RADIOLOGIST  This document has been electronically signed. Dec 20 2018  5:05PM  < from: CT Lumbar Spine No Cont (18 @ 17:41) >  EXAM:  CT LUMBAR SPINE                          PROCEDURE DATE:  2018      NTERPRETATION:  INDICATIONS:  Status post fall now with back pain and   point tenderness over coccyx.      TECHNIQUE:  Serial axial images were obtained usingmulti slice helical   technique. The inferior margin of the imaging volume is the mid to lower   sacrum. The coccyx is not included. Sagittal and coronal reformatted   images were performed.    COMPARISON EXAMINATION: 2018      FINDINGS:    VERTEBRAL BODIES AND DISCS:  Vertebral bodies are normal in height.   Multilevel marginal osteophyte formation is seen. Diffuse osteopenia.   Discogenic endplate changes at the L5-S1 level. No fracture.  ALIGNMENT:  Scoliosis convex to the left with the apex at the L2 level.  L1-L2 LEVEL:  Mild diffuse disc bulge/ridge.  L2-L3 LEVEL:  Mild disc bulge/ridge with facet arthrosis producing mild   spinal stenosis and mild bilateral foraminal stenosis.  L3-L4 LEVEL:  Mild disc bulge with facet arthrosis. Mild bilateral   foraminal narrowing.  L4-L5 LEVEL:  Diffuse disc bulge with facet arthrosis producing mild   spinal stenosis. Moderate left foraminal stenosis.  L5-S1 LEVEL:  Diffuse disc bulge/ridge with bilateral facet arthrosis   producing mild spinal stenosis. Left foraminal stenosis.  SPINAL CANAL:  No other intradural or extradural defects are seen.   MISCELLANEOUS:  None.    IMPRESSION:  No fracture. Clinical correlation will determine the need   for x-ray or CT of the lower sacrum/coccyx to evaluate this area.    Multilevel spondylosis with mild spinal stenosis and foraminal narrowing.    Scoliosis convex to the left.    FADUMO RAMIRES M.D., ATTENDING RADIOLOGIST  This document has been electronically signed. Dec 21 2018 10:37AM

## 2018-12-24 NOTE — PROGRESS NOTE ADULT - SUBJECTIVE AND OBJECTIVE BOX
Sitting no two pillows in chair without any pain currently    Vital Signs Last 24 Hrs  T(C): 36.8 (24 Dec 2018 12:20), Max: 36.9 (23 Dec 2018 19:45)  T(F): 98.2 (24 Dec 2018 12:20), Max: 98.4 (23 Dec 2018 19:45)  HR: 73 (24 Dec 2018 12:20) (64 - 73)  BP: 119/66 (24 Dec 2018 12:20) (119/66 - 130/77)  BP(mean): --  RR: 18 (24 Dec 2018 12:20) (18 - 18)  SpO2: 95% (24 Dec 2018 12:20) (95% - 96%)    GENERAL: NAD, Comfortable  HEAD:  Atraumatic, Normocephalic  EYES: EOMI, PERRLA, conjunctiva and sclera clear  NECK: Supple, No JVD  CHEST/LUNG: Clear to auscultation bilaterally; No wheeze  HEART: Regular rate and rhythm; No murmurs, rubs, or gallops  ABDOMEN: Soft, Nontender, Nondistended; Bowel sounds present  BACK: no spine tenderness. no bruise  Neuro: AAO x 3, no focal deficits, 5/5 b/l extremities  EXTREMITIES:  2+ Peripheral Pulses, No clubbing, cyanosis, or edema  SKIN: No rashes or lesions    LABS:                        12.6   5.68  )-----------( 166      ( 24 Dec 2018 07:51 )             38.3     12-    142  |  107  |  20  ----------------------------<  146<H>  3.6   |  21<L>  |  0.82    Ca    9.1      24 Dec 2018 06:18      PT/INR - ( 24 Dec 2018 08:15 )   PT: 33.3 sec;   INR: 2.83 ratio           CAPILLARY BLOOD GLUCOSE            Urinalysis Basic - ( 22 Dec 2018 20:03 )    Color: Yellow / Appearance: Slightly Turbid / S.015 / pH: x  Gluc: x / Ketone: Negative  / Bili: Negative / Urobili: Negative   Blood: x / Protein: Trace / Nitrite: Negative   Leuk Esterase: Large / RBC: 7 /hpf /  /HPF   Sq Epi: x / Non Sq Epi: 1 / Bacteria: Many

## 2018-12-25 LAB
INR BLD: 3.02 RATIO — HIGH (ref 0.88–1.16)
MAGNESIUM SERPL-MCNC: 2 MG/DL — SIGNIFICANT CHANGE UP (ref 1.6–2.6)
PHOSPHATE SERPL-MCNC: 2.9 MG/DL — SIGNIFICANT CHANGE UP (ref 2.5–4.5)
PROTHROM AB SERPL-ACNC: 35 SEC — HIGH (ref 10–13.1)

## 2018-12-25 RX ADMIN — Medication 250 MILLIGRAM(S): at 01:12

## 2018-12-25 RX ADMIN — Medication 1000 UNIT(S): at 12:45

## 2018-12-25 RX ADMIN — Medication 250 MILLIGRAM(S): at 15:46

## 2018-12-25 NOTE — PROGRESS NOTE ADULT - SUBJECTIVE AND OBJECTIVE BOX
No acute worsening of pain  breathing comfortably    Vital Signs Last 24 Hrs  T(C): 36.8 (25 Dec 2018 06:50), Max: 36.8 (24 Dec 2018 12:20)  T(F): 98.2 (25 Dec 2018 06:50), Max: 98.2 (24 Dec 2018 12:20)  HR: 62 (25 Dec 2018 06:50) (62 - 73)  BP: 128/79 (25 Dec 2018 06:50) (119/66 - 137/77)  BP(mean): --  RR: 18 (25 Dec 2018 06:50) (18 - 18)  SpO2: 99% (25 Dec 2018 06:50) (95% - 99%)    GENERAL: NAD, Comfortable  HEAD:  Atraumatic, Normocephalic  EYES: EOMI, PERRLA, conjunctiva and sclera clear  NECK: Supple, No JVD  CHEST/LUNG: Clear to auscultation bilaterally; No wheeze  HEART: Regular rate and rhythm; No murmurs, rubs, or gallops  ABDOMEN: Soft, Nontender, Nondistended; Bowel sounds present  BACK: no spine tenderness. no bruise  Neuro: AAO x 3, no focal deficits, 5/5 b/l extremities  EXTREMITIES:  2+ Peripheral Pulses, No clubbing, cyanosis, or edema  SKIN: No rashes or lesions    LABS:                        12.6   5.68  )-----------( 166      ( 24 Dec 2018 07:51 )             38.3     12-24    142  |  107  |  20  ----------------------------<  146<H>  3.6   |  21<L>  |  0.82    Ca    9.1      24 Dec 2018 06:18  Phos  2.9     12-25  Mg     2.0     12-25      PT/INR - ( 24 Dec 2018 08:15 )   PT: 33.3 sec;   INR: 2.83 ratio           CAPILLARY BLOOD GLUCOSE

## 2018-12-26 LAB
ANION GAP SERPL CALC-SCNC: 13 MMOL/L — SIGNIFICANT CHANGE UP (ref 5–17)
BUN SERPL-MCNC: 19 MG/DL — SIGNIFICANT CHANGE UP (ref 7–23)
CALCIUM SERPL-MCNC: 9.3 MG/DL — SIGNIFICANT CHANGE UP (ref 8.4–10.5)
CHLORIDE SERPL-SCNC: 107 MMOL/L — SIGNIFICANT CHANGE UP (ref 96–108)
CO2 SERPL-SCNC: 23 MMOL/L — SIGNIFICANT CHANGE UP (ref 22–31)
CREAT SERPL-MCNC: 0.77 MG/DL — SIGNIFICANT CHANGE UP (ref 0.5–1.3)
GLUCOSE SERPL-MCNC: 118 MG/DL — HIGH (ref 70–99)
HCT VFR BLD CALC: 38.1 % — SIGNIFICANT CHANGE UP (ref 34.5–45)
HGB BLD-MCNC: 12.4 G/DL — SIGNIFICANT CHANGE UP (ref 11.5–15.5)
INR BLD: 2.4 RATIO — HIGH (ref 0.88–1.16)
MCHC RBC-ENTMCNC: 29.7 PG — SIGNIFICANT CHANGE UP (ref 27–34)
MCHC RBC-ENTMCNC: 32.5 GM/DL — SIGNIFICANT CHANGE UP (ref 32–36)
MCV RBC AUTO: 91.1 FL — SIGNIFICANT CHANGE UP (ref 80–100)
PLATELET # BLD AUTO: 208 K/UL — SIGNIFICANT CHANGE UP (ref 150–400)
POTASSIUM SERPL-MCNC: 3.7 MMOL/L — SIGNIFICANT CHANGE UP (ref 3.5–5.3)
POTASSIUM SERPL-SCNC: 3.7 MMOL/L — SIGNIFICANT CHANGE UP (ref 3.5–5.3)
PROTHROM AB SERPL-ACNC: 28.3 SEC — HIGH (ref 10–12.9)
RBC # BLD: 4.18 M/UL — SIGNIFICANT CHANGE UP (ref 3.8–5.2)
RBC # FLD: 13.9 % — SIGNIFICANT CHANGE UP (ref 10.3–14.5)
SODIUM SERPL-SCNC: 143 MMOL/L — SIGNIFICANT CHANGE UP (ref 135–145)
WBC # BLD: 7.22 K/UL — SIGNIFICANT CHANGE UP (ref 3.8–10.5)
WBC # FLD AUTO: 7.22 K/UL — SIGNIFICANT CHANGE UP (ref 3.8–10.5)

## 2018-12-26 RX ORDER — WARFARIN SODIUM 2.5 MG/1
6 TABLET ORAL ONCE
Qty: 0 | Refills: 0 | Status: COMPLETED | OUTPATIENT
Start: 2018-12-26 | End: 2018-12-26

## 2018-12-26 RX ADMIN — WARFARIN SODIUM 6 MILLIGRAM(S): 2.5 TABLET ORAL at 21:56

## 2018-12-26 RX ADMIN — Medication 1000 UNIT(S): at 12:44

## 2018-12-26 NOTE — DIETITIAN INITIAL EVALUATION ADULT. - ENERGY NEEDS
Height: 5 feet 2 inches, Weight: 155.6 pounds  BMI: 28.5 kg/m2 IBW: 110 pounds (+/-10%), %IBW: 141%  Pertinent Info: Pt is a 84 yo female with PMH of lung cancer s/p resection (remission), melanoma s/p resection (remission), COPD, Afib, DVT x 2 on coumadin, pt with frequent falls and worsening memory. Pt admitted for normal pressure hyrocephalus. Pt with respiratory failure with hypoxia, DVT on coumadin, COPD, and KRYSTLE. Pt with UTI, currently refusing IV access needed for antibiotic vancomycin. Pt recommended for physical therapy rehab upon d/c. Pt at risk for hospital delirium. As per flowsheet no edema, and no pressure injuries noted at this time.

## 2018-12-26 NOTE — DIETITIAN INITIAL EVALUATION ADULT. - NS AS NUTRI INTERV ED CONTENT
Pt unaware of vitamin K - nutrient interaction however, pt refused nutrition education at this time stating that her daughter is aware of the pt's diet./Priority modifications/Nutrition relationship to health/disease/Purpose of the nutrition education

## 2018-12-26 NOTE — DIETITIAN INITIAL EVALUATION ADULT. - PHYSICAL APPEARANCE
Pt appeared to be well nourished however signs of mild temporal wasting possibly due to advanced age. Pt in pain from fall and Nutrition Focused Physical Assessment not appropriate at this time.

## 2018-12-26 NOTE — DIETITIAN INITIAL EVALUATION ADULT. - OTHER INFO
Pt seen by nutrition for length of stay. Pt seen by nutrition for length of stay. Pt reports fair appetite and po intake of 25-75% of meal tray depending on the meal she gets. Offered patient food preferences however pt states she chooses on her own and her children bring her food that she eats. Pt reports usual body weight as 147 pounds however, as per previous RD note (2/6/18) pt's weight noted as 158 pounds. Pt's weight noted as 155.6 pounds on 12/26. Pt denies nausea/vomiting and diarrhea/constipation. Last bowel movement reported on 12/23, pt states this is normal for her and denied receiving prunes. Pt denied any issues chewing/swallowing. NKFA. Pt seen by nutrition for length of stay. Pt reports fair appetite and po intake of 25-75% of meal tray depending on the meal she gets. Offered patient food preferences however pt states she chooses on her own and her children bring her food that she eats. Pt reports usual body weight as 147 pounds however, as per previous RD note (2/6/18) pt's weight noted as 158 pounds. Pt's weight noted as 155.6 pounds on 12/26. Pt denies nausea/vomiting and diarrhea/constipation. Last bowel movement reported on 12/23, pt states she has always had inconsistent bowel movements and denied prunes at this time. Pt denied any issues chewing/swallowing. NKFA. Pt seen by nutrition for length of stay. Pt reports fair appetite and po intake of 25-75% of meal tray depending on the meal she gets. Offered patient food preferences however pt states she chooses on her own and her children bring her food that she eats. Pt reports usual body weight as 147 pounds however, as per previous RD note (2/6/18) pt's weight noted as 158 pounds with edema. Pt's weight noted as 155.6 pounds on 12/26. Question accuracy of weight history due to fluid shifts, weight likely to be stable at this time. Pt denies nausea/vomiting and diarrhea/constipation. Last bowel movement reported on 12/23, pt states she has always had inconsistent bowel movements and denied prunes at this time. Pt denied any issues chewing/swallowing. NKFA.

## 2018-12-26 NOTE — CONSULT NOTE ADULT - SUBJECTIVE AND OBJECTIVE BOX
[facility], Division of Infectious Diseases  MAYA Pyle A. Lee    CHARLEEN, MARU  83y, Female  [id number]    HPI--  83F with numerous medical issues including L sided lung cancer s/p resection, melanoma s/p resection DM2, DVT, COPD, AF admitted with falls x2 on the day of admission. The patieint is a somewha vague and at timed cantankerous and reluctant historian. Patient denies feeling unwell at all prior to the falls at home. Denies any acute decline in performance status but states has heen using a cane, then a walker to help ambulate. No fevers, chills, or rigors. Denies any HA or visual changes. No ear pain or sinus congenstion. Intermittent cough x years, no change. Denies SOB. No CP. No N/V/D/C. No urinary symptoms. No back or flank pain. Has pain from "hitting my tailbone when I tell." No night sweats. Appetite ok. Grey stable. Patient spent several hours on the floor until her children came and had her brought ot the ER.    Here CT head with concern of NPH and admitted  ED for same. Patient also with abnormal U/A polymicrobial urine on Cx. RVP + for coronavirus.  Patient feeling better. S/{ Ceftriaxone, then given vancomycin when follow up cx grew Enterococcus.     PMH/PSH--  Breast cancer  Lung cancer, left  Diabetes mellitus  Afib  S/P colectomy  Status post left hip replacement  S/P lobectomy of lung      Allergies-- denies.       Medications--  Antibiotics: s/p ceftriaxone. Vancco stoppped by NP (after I reviewed case with her).   Immunologic:   Other: acetaminophen   Tablet .. PRN  cholecalciferol  warfarin      Social History--  EtOH: denies  Tobacco: disatnt prior  Drug Use: denies     Family/Marital History--  Family history of lung cancer (Sibling)  Family history of secondary cancer of bone and bone marrow  Family history of dementia    Remainder not relevant to clinical concern.    Travel/Environmental/Occupational History:  Lives in daughter's home separate apartment  Retired      Review of Systems:  A >=10-point review of systems was obtained.   Review of systems otherwise negative except as previously noted.    Physical Exam--  Vital Signs: T(F): 98.4 (12-26-18 @ 12:09), Max: 98.4 (12-26-18 @ 12:09)  HR: 66 (12-26-18 @ 12:09)  BP: 125/72 (12-26-18 @ 12:09)  RR: 18 (12-26-18 @ 12:09)  SpO2: 95% (12-26-18 @ 12:09)  Wt(kg): --  General: Nontoxic-appearing Female in no acute distress.  HEENT: AT/NC. Anicteric. Conjunctiva pink and moist. Oropharynx clear. Neck: Not rigid. No sense of mass.  Nodes: None palpable.  Lungs: Clear bilaterally without rales, wheezing or rhonchi  Heart: Regular rate and rhythm. No Murmur. No rub. No gallop. No palpable thrill.  Abdomen: Bowel sounds present and normoactive. Soft. Nondistended. Nontender. No sense of mass. No organomegaly.  Back: No spinal tenderness. No costovertebral angle tenderness.   Extremities: No cyanosis or clubbing. 2+ LE edema.   Skin: Warm. Dry. Good turgor. No rash. No vasculitic stigmata.  Neuro: walked with patient 1/2 way around the floor. Nearly fell twice. Broad based gait, as much lateral motion of legs as forward it seams. Was very winded after the brief wealk  Psychiatric: Odd affect.        Laboratory & Imaging Data--  CBC                        12.4   7.22  )-----------( 208      ( 26 Dec 2018 09:52 )             38.1       Chemistries  12-26    143  |  107  |  19  ----------------------------<  118<H>  3.7   |  23  |  0.77    Ca    9.3      26 Dec 2018 08:44  Phos  2.9     12-25  Mg     2.0     12-25    Urinalysis (12.22.18 @ 20:03)    pH Urine: 6.0    Blood, Urine: Trace    Glucose Qualitative, Urine: Negative    Color: Yellow    Urine Appearance: Slightly Turbid    Bilirubin: Negative    Ketone - Urine: Negative    Specific Gravity: 1.015    Protein, Urine: Trace    Urobilinogen: Negative    Nitrite: Negative    Leukocyte Esterase Concentration: Large    Urine Microscopic-Add On (NC) (12.22.18 @ 20:03)    White Blood Cell - Urine: 913 /HPF    Hyaline Casts: 1 /LPF    Bacteria: Many    Epithelial Cells: 1    Red Blood Cell - Urine: 7 /hpf    Urinalysis + Microscopic Examination (12.21.18 @ 07:26)    Urine Appearance: Clear    Urobilinogen: Negative    Protein, Urine: Trace    Specific Gravity: 1.013    Glucose Qualitative, Urine: Negative    Blood, Urine: Negative    Leukocyte Esterase Concentration: Large    pH Urine: 6.0    Nitrite: Negative    Ketone - Urine: Negative    Color: Light Yellow    Bilirubin: Negative    Red Blood Cell - Urine: 5 /hpf    White Blood Cell - Urine: 58 /hpf    Epithelial Cells: 1 /hpf    Hyaline Casts: 1 /lpf    Bacteria: Moderate    Culture Data  Culture - Urine (collected 23 Dec 2018 01:08)  Source: .Urine Clean Catch (Midstream)  Final Report (24 Dec 2018 23:16):    50,000 - 99,000 CFU/mL Enterococcus faecalis  Organism: Enterococcus faecalis (24 Dec 2018 23:16)  Organism: Enterococcus faecalis (24 Dec 2018 23:16)    Culture - Urine (collected 21 Dec 2018 19:28)  Source: .Urine Clean Catch (Midstream)  Final Report (22 Dec 2018 18:04):    Culture grew 3 or more types of organisms which indicate    collection contamination; consider recollection only if clinically    indicated.    < from: Xray Hip w/ Pelvis 2 or 3 Views, Left (12.20.18 @ 15:04) >    IMPRESSION:    The patient is status post total left hip arthroplasty. Hardware appears   intact. No acute    Prostatic fracture is identified. There is chronic appearing ossification   in the region of the distal left iliopsoas tendon. The remaining pelvic   bones demonstrate no evidence of fracture. The right hip joint space is   fairly well preserved. There is no avascular necrosis identified. There   is mild bilateral sacroiliac and pubic symphysis arthrosis. Bones are   demineralized.    If occult fracture is a persistent clinical concern an MRI can be   performed for more sensitive evaluation.    Vascular calcification noted. Overlying the right inferior pubic ramus.   There is severe disc degeneration in the lower lumbar spine.      < end of copied text >      < from: CT Lumbar Spine No Cont (12.20.18 @ 17:41) >  IMPRESSION:  No fracture. Clinical correlation will determine the need   for x-ray or CT of the lower sacrum/coccyx to evaluate this area.    Multilevel spondylosis with mild spinal stenosis and foraminal narrowing.    Scoliosis convex to the left.      < end of copied text >      < from: CT Thoracic Spine Reform No Cont (12.20.18 @ 16:47) >  IMPRESSION: Mild degenerative changes of the thoracic spine. No acute   fractures or dislocations.    < end of copied text >      < from: CT Head No Cont (12.20.18 @ 14:50) >  IMPRESSION:    Enlarged lateral and third ventricles, with slight sulcal effacement near   the vertex, this can be seen with normal pressure hydrocephalus,   microvascular disease with remote and age-indeterminate lacunar infarcts,   there is no acute hemorrhage or midline shift.    < end of copied text >

## 2018-12-26 NOTE — PROGRESS NOTE ADULT - SUBJECTIVE AND OBJECTIVE BOX
In bathroom  no new symptosm or acute SOB/CP    Vital Signs Last 24 Hrs  T(C): 36.9 (26 Dec 2018 12:09), Max: 36.9 (26 Dec 2018 12:09)  T(F): 98.4 (26 Dec 2018 12:09), Max: 98.4 (26 Dec 2018 12:09)  HR: 66 (26 Dec 2018 12:09) (66 - 75)  BP: 125/72 (26 Dec 2018 12:09) (117/68 - 125/72)  BP(mean): --  RR: 18 (26 Dec 2018 12:09) (18 - 18)  SpO2: 95% (26 Dec 2018 12:09) (95% - 96%)    GENERAL: NAD, Comfortable  HEAD:  Atraumatic, Normocephalic  EYES: EOMI, PERRLA, conjunctiva and sclera clear  NECK: Supple, No JVD  CHEST/LUNG: Clear to auscultation bilaterally; No wheeze  HEART: Regular rate and rhythm; No murmurs, rubs, or gallops  ABDOMEN: Soft, Nontender, Nondistended; Bowel sounds present  BACK: no spine tenderness. no bruise  Neuro: AAO x 3, no focal deficits, 5/5 b/l extremities  EXTREMITIES:  2+ Peripheral Pulses, No clubbing, cyanosis, or edema  SKIN: No rashes or lesions    LABS:                        12.4   7.22  )-----------( 208      ( 26 Dec 2018 09:52 )             38.1     12-26    143  |  107  |  19  ----------------------------<  118<H>  3.7   |  23  |  0.77    Ca    9.3      26 Dec 2018 08:44  Phos  2.9     12-25  Mg     2.0     12-25      PT/INR - ( 26 Dec 2018 08:56 )   PT: 28.3 sec;   INR: 2.40 ratio           CAPILLARY BLOOD GLUCOSE

## 2018-12-26 NOTE — DIETITIAN INITIAL EVALUATION ADULT. - SOURCE
electronic medical record/other (specify) patient/electronic medical record/other (specify) other (specify)/patient/electronic medical record, previous RD assessment

## 2018-12-26 NOTE — PROGRESS NOTE ADULT - SUBJECTIVE AND OBJECTIVE BOX
SUBJECTIVE: No new neurologic events overnight.  No new neurologic complaints.  wants to go home    Medications:  MEDICATIONS  (STANDING):  cholecalciferol 1000 Unit(s) Oral daily  vancomycin  IVPB 1000 milliGRAM(s) IV Intermittent every 12 hours    MEDICATIONS  (PRN):  acetaminophen   Tablet .. 650 milliGRAM(s) Oral every 6 hours PRN Moderate Pain (4 - 6)      Labs:  CBC Full  -  ( 26 Dec 2018 09:52 )  WBC Count : 7.22 K/uL  Hemoglobin : 12.4 g/dL  Hematocrit : 38.1 %  Platelet Count - Automated : 208 K/uL  Mean Cell Volume : 91.1 fl  Mean Cell Hemoglobin : 29.7 pg  Mean Cell Hemoglobin Concentration : 32.5 gm/dL  Auto Neutrophil # : x  Auto Lymphocyte # : x  Auto Monocyte # : x  Auto Eosinophil # : x  Auto Basophil # : x  Auto Neutrophil % : x  Auto Lymphocyte % : x  Auto Monocyte % : x  Auto Eosinophil % : x  Auto Basophil % : x    12-26    143  |  107  |  19  ----------------------------<  118<H>  3.7   |  23  |  0.77    Ca    9.3      26 Dec 2018 08:44  Phos  2.9     12-25  Mg     2.0     12-25      CAPILLARY BLOOD GLUCOSE          PT/INR - ( 26 Dec 2018 08:56 )   PT: 28.3 sec;   INR: 2.40 ratio               Vitals:  Vital Signs Last 24 Hrs  T(C): 36.7 (26 Dec 2018 04:33), Max: 37.1 (25 Dec 2018 12:26)  T(F): 98.1 (26 Dec 2018 04:33), Max: 98.7 (25 Dec 2018 12:26)  HR: 75 (26 Dec 2018 04:33) (74 - 76)  BP: 125/70 (26 Dec 2018 04:33) (117/68 - 141/69)  BP(mean): --  RR: 18 (26 Dec 2018 04:33) (18 - 18)  SpO2: 96% (26 Dec 2018 04:33) (95% - 96%)        NEUROLOGICAL EXAM:    Mental status: Awake, alert, and in no apparent distress. Oriented to self, hospital. Speech clear and fluent, follows commands well.    Cranial Nerves: Pupils were equal, round, reactive to light. Extraocular movements were intact. B TT. Fundoscopic exam was deferred. Facial sensation was intact to light touch. There was no facial asymmetry. The palate was upgoing symmetrically and tongue was midline. Hearing acuity was intact to finger rub AU. Shoulder shrug was full bilaterally    Motor exam: Bulk and tone were normal. Strength was 5/5 in all four extremities. Fine finger movements were symmetric and normal. There was no pronator drift    Reflexes: 1 in the bilateral upper extremities. 1 in the bilateral lower extremities. Toes were downgoing bilaterally.     Sensation: Intact to light touch.     Coordination: Finger-nose-finger without dysmetria.     Gait: deferred    < from: CT Head No Cont (12.20.18 @ 14:50) >    EXAM:  CT BRAIN                          PROCEDURE DATE:  12/20/2018      INTERPRETATION:  INDICATIONS:  fall on AC yesterday , frequent falls    TECHNIQUE:  Serial axial images were obtained from the skull base to the   vertex without intravenous contrast. Coronal and sagittal reformatted   images were obtained.    COMPARISON EXAMINATION: CT head 2/4/2018    FINDINGS:    In comparison to prior, redemonstration of  a prominent bifrontal   diameter of 4.9 cm and prominent third ventriclewith a transverse   measurement of 1.1 cm, fourth ventricle is unremarkable and midline.   There is redemonstration enlargement of the sulci in the lower bifrontal   temporal regions with minimal sulcal effacement at the vertex, this can   be seen with normal pressure hydrocephalus. There is nonspecific white   matter decreased attenuation, likely  microvascular disease, with remote   and age indeterminate lacunar infarction. There is no acute new   intraventricular or extra hemorrhage or midlineshift. The basal cisterns   are patent.    The pituitary is unremarkable, there is atherosclerotic vascular   calcification the carotid siphons.    There are absent orbital lenses with previous cataract surgery. There is   mucosal thickening with retention cysts or polyps in the maxillary,   ethmoid, sphenoid, and frontal sinuses. 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 Tympanic mastoid cavities demonstrate minimal   scattered  opacification. Calvarium remains intact and unchanged.    IMPRESSION:    Enlarged lateral and third ventricles, with slight sulcal effacement near   the vertex, this can be seen with normal pressure hydrocephalus,   microvascular disease with remote and age-indeterminate lacunar infarcts,   there is no acute hemorrhage or midline shift.    SON MORAN M.D., RADIOLOGY RESIDENT  This document has been electronically signed.  CHUCK MESA M.D., ATTENDING RADIOLOGIST  This document has been electronically signed. Dec 20 2018  3:28PM    < from: CT Thoracic Spine Reform No Cont (12.20.18 @ 16:47) >    EXAM:  CT REFORM SPINE T                          PROCEDURE DATE:  12/20/2018      INTERPRETATION:  Clinical indication: Point tenderness to thoracic spine    Serial thin sections on a multi slice scanner were obtained through the   thoracic spine from the C7 to the L1-2 level in a stacked axial fashion   reformatted at 1.25 mm sections with sagittal and coronal computer   generated reconstructed views.    Images were returned reconstructed from a chest CT.    The vertebral bodies are normal in height and density. Small right-sided   osteophytes are identified from T7 through T 11. No acute fractures or   dislocations are identified. There is mild dextroscoliosis. Spinal soft   tissues are unremarkable.      The dental note is made of splenic artery calcification.     IMPRESSION: Mild degenerative changes of the thoracic spine. No acute   fractures or dislocations.    JACQUIE DUONG M.D., ATTENDING RADIOLOGIST  This document has been electronically signed. Dec 20 2018  5:05PM  < from: CT Lumbar Spine No Cont (12.20.18 @ 17:41) >  EXAM:  CT LUMBAR SPINE                          PROCEDURE DATE:  12/20/2018      NTERPRETATION:  INDICATIONS:  Status post fall now with back pain and   point tenderness over coccyx.      TECHNIQUE:  Serial axial images were obtained usingmulti slice helical   technique. The inferior margin of the imaging volume is the mid to lower   sacrum. The coccyx is not included. Sagittal and coronal reformatted   images were performed.    COMPARISON EXAMINATION: 2/14/2018      FINDINGS:    VERTEBRAL BODIES AND DISCS:  Vertebral bodies are normal in height.   Multilevel marginal osteophyte formation is seen. Diffuse osteopenia.   Discogenic endplate changes at the L5-S1 level. No fracture.  ALIGNMENT:  Scoliosis convex to the left with the apex at the L2 level.  L1-L2 LEVEL:  Mild diffuse disc bulge/ridge.  L2-L3 LEVEL:  Mild disc bulge/ridge with facet arthrosis producing mild   spinal stenosis and mild bilateral foraminal stenosis.  L3-L4 LEVEL:  Mild disc bulge with facet arthrosis. Mild bilateral   foraminal narrowing.  L4-L5 LEVEL:  Diffuse disc bulge with facet arthrosis producing mild   spinal stenosis. Moderate left foraminal stenosis.  L5-S1 LEVEL:  Diffuse disc bulge/ridge with bilateral facet arthrosis   producing mild spinal stenosis. Left foraminal stenosis.  SPINAL CANAL:  No other intradural or extradural defects are seen.   MISCELLANEOUS:  None.    IMPRESSION:  No fracture. Clinical correlation will determine the need   for x-ray or CT of the lower sacrum/coccyx to evaluate this area.    Multilevel spondylosis with mild spinal stenosis and foraminal narrowing.    Scoliosis convex to the left.    FADUMO RAMIRES M.D., ATTENDING RADIOLOGIST  This document has been electronically signed. Dec 21 2018 10:37AM

## 2018-12-26 NOTE — PROVIDER CONTACT NOTE (OTHER) - ACTION/TREATMENT ORDERED:
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 Refused IV access need antibiotic vancomycin for UTI. Np Jeanine Anderson made aware. Patient wants to speak to doctor in the morning.

## 2018-12-26 NOTE — DIETITIAN INITIAL EVALUATION ADULT. - PROBLEM SELECTOR PLAN 6
Daughter says pt does not drink enough. Suspect prerenal as exam supports dehydration with dry MM.   NS @80mls/hr

## 2018-12-26 NOTE — DIETITIAN INITIAL EVALUATION ADULT. - ADHERENCE
Pt reports staying away from salt and sugar. Patient on coumadin PTA, pt unaware of vitamin K and coumadin interaction. Pt declined nutrition education at this time and states that her daughter knows all about her diet and prepares her food./fair Pt reports staying away from salt and sugar. Pt denies history of DM, therefore denies taking any mediation and checking blood sugar. Patient on coumadin PTA, pt unaware of vitamin K and coumadin interaction. Pt declined nutrition education at this time and states that her daughter knows all about her diet and prepares her food./fair Pt reports staying away from salt and sugar. Pt denies history of DM, therefore denies taking any medication and checking blood sugar. Patient on coumadin PTA, pt unaware of vitamin K and coumadin interaction. Pt declined nutrition education at this time and states that her daughter knows all about her diet and prepares her food./fair

## 2018-12-26 NOTE — DIETITIAN INITIAL EVALUATION ADULT. - PATIENT REFUSES SNACKS AND/OR SUPPLEMENTS
History and Physical


Date of Admission


Date of Admission


DATE: 18 


TIME: 10:35





Identification/Chief Complaint


Chief Complaint


  SEEN IN ER  78  year old  male with history of DVT, cellulitis who 

presented with right foot read leg swelling for the past several days with deep 

foot ulcer over the dorsum of right lateral mid foot.





 Patient states ulcer began as a blister which burst and he then developed a 

rash.,denies fever chills, nausea vomiting or sweats





Past Medical History


Past Medical History:  Angina, DVT, Other PVD


Additional Past Medical Histor:  CELLULITIS, back pain,


Past Surgical History:  Other


Additional Past Surgical Histo:  R LEG SURG


Alcohol Use:  Occasionally


Drug Use:  None





family hx ashd


Cardiovascular:  No pertinent hx, Hyperlipidemia


Pulmonary:  No pertinent hx, COPD


GI:  No pertinent hx


Hepatobiliary:  No pertinent hx


Psych:  Addictions


Musculoskeletal:  Osteoarthritis


Infectious disease:  Other


Renal/:  No pertinent hx


Endocrine:  No pertinent hx





Family History


Family History:  No Significant, High Cholestrol





Social History


Smoke:  <1 pack per day


ALCOHOL:  rare


Drugs:  None





Current Problem List


Problem List


Problems


Medical Problems:


(1) Cellulitis of right lower leg


Status: Acute  





(2) Right foot ulcer


Status: Acute  











Current Medications


Current Medications





Current Medications


Sodium Chloride 1,000 ml @  1,000 mls/hr 1X  ONCE IV  Last administered on at 19:19;  Start 18 at 19:00;  Stop 18 at 19:59;  Status DC


Vancomycin HCl (Vanco Per Pharmacy) 1 each PRN DAILY  PRN MC SEE COMMENTS Last 

administered on 18at 02:01;  Start 18 at 19:45


Vancomycin HCl 2 gm/Sodium Chloride 500 ml @  250 mls/hr ONCE  ONCE IV  Last 

administered on 18at 20:00;  Start 18 at 19:45;  Stop 18 at 21

:44;  Status DC


Ondansetron HCl (Zofran) 4 mg PRN Q8HRS  PRN IV NAUSEA/VOMITING 1ST CHOICE;  

Start 18 at 21:15;  Stop 18 at 21:14


Sodium Chloride 1,000 ml @  125 mls/hr Q8H IV  Last administered on 18at 

09:14;  Start 18 at 21:03;  Stop 18 at 21:02


Fentanyl Citrate (Fentanyl 2ml Vial) 25 mcg PRN Q2HR  PRN IV SEVERE PAIN Last 

administered on 18at 09:14;  Start 18 at 01:45


Vancomycin HCl 1.25 gm/Sodium Chloride 250 ml @  167 mls/hr Q12H IV  Last 

administered on 18at 09:14;  Start 18 at 08:00


Vancomycin HCl (Vancomycin Trough Level) 1 each 1X  ONCE MC ;  Start 18 

at 07:30;  Stop 18 at 07:31


Lactobacillus Rhamnosus (Culturelle) 1 cap BID PO  Last administered on at 09:15;  Start 18 at 09:00





Active Scripts


Active


Reported


Eliquis (Apixaban) 5 Mg Tablet 5 Mg PO BID





Allergies


Allergies:  


Coded Allergies:  


     No Known Drug Allergies (Unverified , 17)





ROS


Review of System





Review of Systems


Review of Systems


ROS as per HPI





14 pt  systems were reviewed and found to be within normal limits, except as 

documented


PSYCHOLOGICAL ROS:  No: Anxiety, Behavioral Disorder, Concentration difficultie

, Decreased libido, Depression, Disorientation, Hallucinations, Hostility, 

Irritablity, Memory difficulties, Mood Swings, Obsessive thoughts, Physical 

abuse, Sexual abuse, Sleep disturbances, Suicidal ideation, Other


ALLERGY AND IMMUNOLOGY:  No: Hives, Insect Bite Sensitivity, Itchy/Watery Eyes, 

Nasal Congestion, Post Nasal Drip, Seasonal Allergies, Other


Hematological and Lymphatic:  No: Bleeding Problems, Blood Clots, Blood 

Transfusions, Brusing, Night Sweats, Pallor, Swollen Lymph Nodes, Other


Respiratory:  No: Cough, Hemoptysis, Orthopnea, Pleuritic Pain, Shortness of 

breath, SOB with excertion, Sputum Changes, Stridor, Tachypnea, Wheezing, Other


Cardiovascular:  No Chest Pain, No Palpitations, No Orthopnea, No Paroxysmal 

Noc. Dyspnea, No Edema, No Lt Headedness, No Other


Gastrointestinal:  No Nausea, No Vomiting, No Abdominal Pain, No Diarrhea, No 

Constipation, No Melena, No Hematochezia, No Other


Musculoskeletal:  Yes Gait Disturbance, Yes Joint Pain, Yes Joint Swelling


Skin:  Yes Skin Lesion Changes





Physical Exam


Physical Exam





Physical Exam


Physical Exam





Constitutional: Well developed, well nourished, no acute distress, non-toxic 

appearance. []


HENT: Normocephalic, atraumatic, bilateral external ears normal, oropharynx 

moist,, nose normal. []


Eyes: PERRLA, EOMI, conjunctiva normal, no discharge. [] 


Neck: Normal range of motion, no tenderness, supple, no stridor. [] 


Cardiovascular:Heart rate regular rhythm, no murmur []


Lungs & Thorax:  Bilateral breath sounds clear to auscultation []


Abdomen: Bowel sounds normal, soft, no tenderness, no masses, no pulsatile 

masses. [] 


Skin: Ichthyosis of B lower extremity with warm, dry, cracked skin. Moderate 

erythema involving R foot and calf, extending from toes toes to mid calf.  Deep 

ulcer over medial aspect of R lateral forefoot. 1+ bipedal pulses, symmetric.[]

  


Extremities: of bilateral lower extremities, negative Homans sign. [] 


Neurologic: Alert and oriented X 3, normal motor function, normal sensory 

function. []


Psychologic: Affect normal, judgement normal, mood normal. []


General:  Alert, Oriented X3, Cooperative, mild distress


HEENT:  Atraumatic, PERRLA, EOMI, Mucous membr. moist/pink


Lungs:  Clear to auscultation, Normal air movement


Heart:  no gallops


Breasts:  Not examined


Abdomen:  Normal bowel sounds, Soft


Rectal Exam:  not examined


Extremities:  No cyanosis


Neuro:  Normal speech, Cranial nerves 3-12 NL


Psych/Mental Status:  Mood NL





Vitals


Vitals





Vital Signs








  Date Time  Temp Pulse Resp B/P (MAP) Pulse Ox O2 Delivery O2 Flow Rate FiO2


 


18 09:14     93 Room Air  


 


18 07:00 98.3 89 20 121/73 (89)   2.0 





 98.3       











Labs


Labs





Laboratory Tests








Test


 18


19:20 18


08:35


 


White Blood Count


 7.4 x10^3/uL


(4.0-11.0) 7.9 x10^3/uL


(4.0-11.0)


 


Red Blood Count


 6.19 x10^6/uL


(4.30-5.70) 5.89 x10^6/uL


(4.30-5.70)


 


Hemoglobin


 20.1 g/dL


(13.0-17.5) 18.7 g/dL


(13.0-17.5)


 


Hematocrit


 58.0 %


(39.0-53.0) 56.0 %


(39.0-53.0)


 


Mean Corpuscular Volume 94 fL ()  95 fL () 


 


Mean Corpuscular Hemoglobin 33 pg (25-35)  32 pg (25-35) 


 


Mean Corpuscular Hemoglobin


Concent 35 g/dL


(31-37) 33 g/dL


(31-37)


 


Red Cell Distribution Width


 17.2 %


(11.5-14.5) 17.0 %


(11.5-14.5)


 


Platelet Count


 148 x10^3/uL


(140-400) 133 x10^3/uL


(140-400)


 


Neutrophils (%) (Auto) 67 % (31-73)  70 % (31-73) 


 


Lymphocytes (%) (Auto) 19 % (24-48)  17 % (24-48) 


 


Monocytes (%) (Auto) 12 % (0-9)  11 % (0-9) 


 


Eosinophils (%) (Auto) 2 % (0-3)  2 % (0-3) 


 


Basophils (%) (Auto) 0 % (0-3)  0 % (0-3) 


 


Neutrophils # (Auto)


 4.9 x10^3uL


(1.8-7.7) 5.5 x10^3uL


(1.8-7.7)


 


Lymphocytes # (Auto)


 1.4 x10^3/uL


(1.0-4.8) 1.3 x10^3/uL


(1.0-4.8)


 


Monocytes # (Auto)


 0.9 x10^3/uL


(0.0-1.1) 0.9 x10^3/uL


(0.0-1.1)


 


Eosinophils # (Auto)


 0.1 x10^3/uL


(0.0-0.7) 0.1 x10^3/uL


(0.0-0.7)


 


Basophils # (Auto)


 0.0 x10^3/uL


(0.0-0.2) 0.0 x10^3/uL


(0.0-0.2)


 


Erythrocyte Sedimentation Rate 0 (0-15)  


 


Sodium Level


 140 mmol/L


(136-145) 





 


Potassium Level


 4.4 mmol/L


(3.5-5.1) 





 


Chloride Level


 103 mmol/L


() 





 


Carbon Dioxide Level


 29 mmol/L


(21-32) 





 


Anion Gap 8 (6-14)  


 


Blood Urea Nitrogen


 10 mg/dL


(8-26) 





 


Creatinine


 1.0 mg/dL


(0.7-1.3) 





 


Estimated GFR


(Cockcroft-Gault) 72.3 


 





 


BUN/Creatinine Ratio 10 (6-20)  


 


Glucose Level


 88 mg/dL


(70-99) 





 


Calcium Level


 9.2 mg/dL


(8.5-10.1) 





 


Total Bilirubin


 0.6 mg/dL


(0.2-1.0) 





 


Aspartate Amino Transf


(AST/SGOT) 16 U/L (15-37) 


 





 


Alanine Aminotransferase


(ALT/SGPT) 15 U/L (16-63) 


 





 


Alkaline Phosphatase


 101 U/L


() 





 


C-Reactive Protein,


Quantitative 7.7 mg/L


(0-3.3) 





 


Total Protein


 7.4 g/dL


(6.4-8.2) 





 


Albumin


 3.5 g/dL


(3.4-5.0) 





 


Albumin/Globulin Ratio 0.9 (1.0-1.7)  








Laboratory Tests








Test


 18


19:20 18


08:35


 


White Blood Count


 7.4 x10^3/uL


(4.0-11.0) 7.9 x10^3/uL


(4.0-11.0)


 


Red Blood Count


 6.19 x10^6/uL


(4.30-5.70) 5.89 x10^6/uL


(4.30-5.70)


 


Hemoglobin


 20.1 g/dL


(13.0-17.5) 18.7 g/dL


(13.0-17.5)


 


Hematocrit


 58.0 %


(39.0-53.0) 56.0 %


(39.0-53.0)


 


Mean Corpuscular Volume 94 fL ()  95 fL () 


 


Mean Corpuscular Hemoglobin 33 pg (25-35)  32 pg (25-35) 


 


Mean Corpuscular Hemoglobin


Concent 35 g/dL


(31-37) 33 g/dL


(31-37)


 


Red Cell Distribution Width


 17.2 %


(11.5-14.5) 17.0 %


(11.5-14.5)


 


Platelet Count


 148 x10^3/uL


(140-400) 133 x10^3/uL


(140-400)


 


Neutrophils (%) (Auto) 67 % (31-73)  70 % (31-73) 


 


Lymphocytes (%) (Auto) 19 % (24-48)  17 % (24-48) 


 


Monocytes (%) (Auto) 12 % (0-9)  11 % (0-9) 


 


Eosinophils (%) (Auto) 2 % (0-3)  2 % (0-3) 


 


Basophils (%) (Auto) 0 % (0-3)  0 % (0-3) 


 


Neutrophils # (Auto)


 4.9 x10^3uL


(1.8-7.7) 5.5 x10^3uL


(1.8-7.7)


 


Lymphocytes # (Auto)


 1.4 x10^3/uL


(1.0-4.8) 1.3 x10^3/uL


(1.0-4.8)


 


Monocytes # (Auto)


 0.9 x10^3/uL


(0.0-1.1) 0.9 x10^3/uL


(0.0-1.1)


 


Eosinophils # (Auto)


 0.1 x10^3/uL


(0.0-0.7) 0.1 x10^3/uL


(0.0-0.7)


 


Basophils # (Auto)


 0.0 x10^3/uL


(0.0-0.2) 0.0 x10^3/uL


(0.0-0.2)


 


Erythrocyte Sedimentation Rate 0 (0-15)  


 


Sodium Level


 140 mmol/L


(136-145) 





 


Potassium Level


 4.4 mmol/L


(3.5-5.1) 





 


Chloride Level


 103 mmol/L


() 





 


Carbon Dioxide Level


 29 mmol/L


(21-32) 





 


Anion Gap 8 (6-14)  


 


Blood Urea Nitrogen


 10 mg/dL


(8-26) 





 


Creatinine


 1.0 mg/dL


(0.7-1.3) 





 


Estimated GFR


(Cockcroft-Gault) 72.3 


 





 


BUN/Creatinine Ratio 10 (6-20)  


 


Glucose Level


 88 mg/dL


(70-99) 





 


Calcium Level


 9.2 mg/dL


(8.5-10.1) 





 


Total Bilirubin


 0.6 mg/dL


(0.2-1.0) 





 


Aspartate Amino Transf


(AST/SGOT) 16 U/L (15-37) 


 





 


Alanine Aminotransferase


(ALT/SGPT) 15 U/L (16-63) 


 





 


Alkaline Phosphatase


 101 U/L


() 





 


C-Reactive Protein,


Quantitative 7.7 mg/L


(0-3.3) 





 


Total Protein


 7.4 g/dL


(6.4-8.2) 





 


Albumin


 3.5 g/dL


(3.4-5.0) 





 


Albumin/Globulin Ratio 0.9 (1.0-1.7)  











Images


Images


: 1940 LOCATION: [address] [place] [address] AGE: 76


SEX: M EXAM DT: 17 ACCESSION#: [id number]


STATUS: ADM IN ORD. PHYSICIAN: GABBY GILBERT MD 


REASON: leg pain


PROCEDURE: CT ANGIO ABD ILEO/FEMOR RUNOFF





Indication pain.





CTA targeted to the abdominal aorta and the major runoff vessels was


performed. Images were reformatted in the coronal and sagittal planes. Volume


rendered images were also generated and reviewed. Note is made of the abnormal


arterial Doppler study earlier in the day. Approximately 95 cc of Omnipaque


350 was administered. No similar imaging is available.





There is some minimal volume loss, likely reflecting scar, in the right middle


lobe. A definite significant finding and the visualized lung bases is not


seen. There is a periumbilical hernia containing a bowel loop which appears


uncomplicated. There is a right inguinal hernia containing fat also appearing


uncomplicated. The liver and spleen appear unremarkable and the gallbladder


appears grossly normal. No adrenal or renal anomalies are seen. The pancreas


appears unremarkable. Acute finding in the abdomen is not seen. The prostate


is moderately enlarged. No acute finding is seen in the pelvis. There are a


few lymph nodes seen in the groin which are likely incidental. A significant


soft tissue finding in either lower extremity is not seen





There is intimal thickening involving the abdominal aorta. There is no


aneurysm. The celiac and SMA are widely patent at their origins. The LEIGHA is


not seen. There are single main renal arteries bilaterally which are widely


patent.





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 Collateral vessels reconstitute the


internal iliac artery. The superficial femoral artery reconstitutes in the


upper thigh there is severe narrowing of the more distal right superficial


femoral artery. There is no significant flow seen in the popliteal artery.


There is two-vessel runoff to the ankle.





On the left there is a web with probable moderate stenosis associated with the


common iliac artery. The internal and external iliac arteries are patent. The


common femoral is patent. There is marked narrowing of the distal superficial


femoral artery. There is no significant flow seen in the popliteal artery.


There is single vessel runoff to the ankle. The posterior tibial artery is


patent to the ankle. The peroneal artery reconstitutes in the mid calf.





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 Collateral flow


reconstitutes the right superficial femoral artery proximally but more


distally the vessel is severely tapered and probably occluded. There is no


significant flow seen in the right popliteal artery. There is two-vessel


runoff to the ankle from reconstituted collateral vessels.


                        On the left there is some moderate narrowing


associated with the common iliac artery. There is marked narrowing of the


superficial femoral artery distally and no significant flow is seen in the


popliteal artery. There is single vessel runoff to the foot.


                       Ventral wall abdominal hernia and right inguinal


hernia, both of which appear uncomplicated..


                       Enlarged prostate.





PQRS Compliance Statement:





One or more of the following individualized dose reduction techniques were


utilized for this examination:


1. Automated exposure control


2. Adjustment of the mA and/or kV according to patient size


3. Use of iterative reconstruction technique





VTE Prophylaxis Ordered


VTE Prophylaxis Devices:  Contraindicated


VTE Pharmacological Prophylaxi:  Yes





Assessment/Plan


Assessment/Plan


impression


1. cellulitis right foot and leg


 


2. Mild soft tissue swelling identified dorsal to the metatarsal could be 


edema or soft tissue infection.


 


3. No acute osseous findings.





4. tobacco abuse disorder





5.  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 Collateral flow


reconstitutes the right superficial femoral artery proximally but more


distally the vessel is severely tapered and probably occluded. There is no


significant flow seen in the right popliteal artery. There is two-vessel


runoff to the ankle from reconstituted collateral vessels. in 2017





6. HX PE


 


 


 








plan





iv vanc Q 12 HRS


ID CONSULT


WOUND CARE CONSULT


dvt prophylaxis


blood cult


vascular consult











SHIRLEY DEMARCO MD Dec 20, 2018 10:36 Statement Selected

## 2018-12-26 NOTE — PROVIDER CONTACT NOTE (OTHER) - ASSESSMENT
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 Refused IV access need antibiotic vancomycin for UTI.

## 2018-12-26 NOTE — CONSULT NOTE ADULT - ASSESSMENT
Falls  Evidence of normal pressure hydrocephalus on CT, new since 2/2018 based on report though neurology states changes are old they do not believe the patient has NPH.   +RVP viral respiratory infection though patient denies symptoms  Bacteruria, no symptoms per patient and got 'better' without any anti-enterococcal therapy. As such I see no need for antibiotics vs. the urinary isolate specifically.  Decline in performance status seems subacute with acute decline.  Patient denies any symptomatology whatsoever, making the significance of the RVP and urine cx nebulous    Suggestions--  D/C antibiotics  Observe  Difficult risk-benefit balance of coumadin with DVT and falls. Defer to medicine  PT meg, based on my walk with the patient I have a hard time seeing her go home safely. She had previously been discharged home in January with Dx of DVT, only to be readmitted in February with ?UTI ?proctitis and discharged to rehab.     Reviewed with Dr. Morocho  Reviewed with NP    Thank you for the courtesy of this referral.    Rodrigo Hurley MD  [phone]

## 2018-12-26 NOTE — DIETITIAN INITIAL EVALUATION ADULT. - ORAL INTAKE PTA
Pt reports good appetite and po intake PTA. Pt reports her children cook three meals a day for her. Pt reports taking Vitamin D3 PTA./good

## 2018-12-27 ENCOUNTER — TRANSCRIPTION ENCOUNTER (OUTPATIENT)
Age: 83
End: 2018-12-27

## 2018-12-27 LAB
ANION GAP SERPL CALC-SCNC: 14 MMOL/L — SIGNIFICANT CHANGE UP (ref 5–17)
BUN SERPL-MCNC: 22 MG/DL — SIGNIFICANT CHANGE UP (ref 7–23)
CALCIUM SERPL-MCNC: 9.1 MG/DL — SIGNIFICANT CHANGE UP (ref 8.4–10.5)
CHLORIDE SERPL-SCNC: 108 MMOL/L — SIGNIFICANT CHANGE UP (ref 96–108)
CO2 SERPL-SCNC: 19 MMOL/L — LOW (ref 22–31)
CREAT SERPL-MCNC: 0.73 MG/DL — SIGNIFICANT CHANGE UP (ref 0.5–1.3)
GLUCOSE SERPL-MCNC: 212 MG/DL — HIGH (ref 70–99)
POTASSIUM SERPL-MCNC: 4 MMOL/L — SIGNIFICANT CHANGE UP (ref 3.5–5.3)
POTASSIUM SERPL-SCNC: 4 MMOL/L — SIGNIFICANT CHANGE UP (ref 3.5–5.3)
SODIUM SERPL-SCNC: 141 MMOL/L — SIGNIFICANT CHANGE UP (ref 135–145)

## 2018-12-27 RX ORDER — WARFARIN SODIUM 2.5 MG/1
6 TABLET ORAL ONCE
Qty: 0 | Refills: 0 | Status: DISCONTINUED | OUTPATIENT
Start: 2018-12-27 | End: 2018-12-27

## 2018-12-27 RX ORDER — ACETAMINOPHEN 500 MG
2 TABLET ORAL
Qty: 0 | Refills: 0 | DISCHARGE
Start: 2018-12-27

## 2018-12-27 RX ORDER — WARFARIN SODIUM 2.5 MG/1
6 TABLET ORAL ONCE
Qty: 0 | Refills: 0 | Status: COMPLETED | OUTPATIENT
Start: 2018-12-27 | End: 2018-12-27

## 2018-12-27 RX ORDER — CHOLECALCIFEROL (VITAMIN D3) 125 MCG
1000 CAPSULE ORAL
Qty: 0 | Refills: 0 | DISCHARGE
Start: 2018-12-27

## 2018-12-27 RX ADMIN — WARFARIN SODIUM 6 MILLIGRAM(S): 2.5 TABLET ORAL at 21:27

## 2018-12-27 RX ADMIN — Medication 1000 UNIT(S): at 11:21

## 2018-12-27 NOTE — PROGRESS NOTE ADULT - ATTENDING COMMENTS
Rodrigo Hurley MD  [phone]
- Dr. MYNOR Will ([facility])  - [phone]
Darrick Barrett will be covering for me starting 12/23/18. He can be reached at  if needed.     - Dr. MYNOR Will ([facility])  - [phone]
Aiming for [place] tomorrow

## 2018-12-27 NOTE — PROGRESS NOTE ADULT - PROBLEM SELECTOR PROBLEM 6
COPD (chronic obstructive pulmonary disease)
KRYSTLE (acute kidney injury)
KRYSTLE (acute kidney injury)

## 2018-12-27 NOTE — PROGRESS NOTE ADULT - PROBLEM SELECTOR PROBLEM 7
KRYSTLE (acute kidney injury)
Need for prophylactic measure
Need for prophylactic measure

## 2018-12-27 NOTE — PROGRESS NOTE ADULT - PROBLEM SELECTOR PLAN 7
Daughter says pt does not drink enough.   s/p NS @80mls/hr. will hold to prevent fluid overload.
Coumadin
Coumadin
Daughter says pt does not drink enough.   s/p NS @80mls/hr. will hold to prevent fluid overload.

## 2018-12-27 NOTE — PROGRESS NOTE ADULT - PROBLEM SELECTOR PLAN 5
CT thoracic spine shows no acute fracture  Tylenol Q6hrs PRN pain  PT eval  Also has coccyx pain and low back pain (more nonspecific in lumbar spine)  CT lumbar spine neg for fracture. Multilevel spondylosis with mild spinal stenosis and foraminal narrowing.  Tylenol as above.
Well controlled on Aero ellipto  Can take her own. d/w the daughter to bring it in.
Well controlled on Aero ellipto  Can take her own. d/w the daughter to bring it in.
CT thoracic spine shows no acute fracture  Tylenol Q6hrs PRN pain  PT eval  Also has coccyx pain and low back pain (more nonspecific in lumbar spine)  CT lumbar spine neg for fracture. Multilevel spondylosis with mild spinal stenosis and foraminal narrowing.  Tylenol as above.

## 2018-12-27 NOTE — PROGRESS NOTE ADULT - SUBJECTIVE AND OBJECTIVE BOX
[facility], Division of Infectious Diseases  MAYA Pyle A. Lee  [phone]    Name: MARU VILLASEÑOR  Age: 83y  Gender: Female  MRN: [id number]    Interval History--  Notes reviewed. Patient without new complaint and still demanding discharge home regardless of safety concerns.      Past Medical History--  Breast cancer  Lung cancer, left  Diabetes mellitus  Afib  S/P colectomy  Status post left hip replacement  S/P lobectomy of lung      For details regarding the patient's social history, family history, and other miscellaneous elements, please refer the initial infectious diseases consultation and/or the admitting history and physical examination for this admission.    Allergies    codeine (Fever; Rash)  contrast media (iodine-based) (Other)    Intolerances        Medications--  Antibiotics:    Immunologic:    Other:  acetaminophen   Tablet .. PRN  cholecalciferol      Review of Systems--  A 10-point review of systems was obtained.   Review of systems otherwise unchanged compared to prior visit except as previously noted.    Physical Examination--  Vital Signs: T(F): 98.2 (12-27-18 @ 05:38), Max: 98.4 (12-26-18 @ 12:09)  HR: 58 (12-27-18 @ 05:38)  BP: 125/83 (12-27-18 @ 05:38)  RR: 18 (12-27-18 @ 05:38)  SpO2: 96% (12-27-18 @ 05:38)  Wt(kg): --  General: Nontoxic-appearing Female in no acute distress.  HEENT: AT/NC. Anicteric. Conjunctiva pink and moist. Oropharynx clear. Neck: Not rigid. No sense of mass.  Nodes: None palpable.  Lungs: Clear bilaterally without rales, wheezing or rhonchi  Heart: Regular rate and rhythm. No Murmur. No rub. No gallop. No palpable thrill.  Abdomen: Bowel sounds present and normoactive. Soft. Nondistended. Nontender. No sense of mass. No organomegaly.  Back: No spinal tenderness. No costovertebral angle tenderness.   Extremities: No cyanosis or clubbing. 2+ LE edema.   Skin: Warm. Dry. Good turgor. No rash. No vasculitic stigmata.  Psychiatric: Affect alternates between pleasant and somewhat hostile.         Laboratory Studies--  CBC                        12.4   7.22  )-----------( 208      ( 26 Dec 2018 09:52 )             38.1       Chemistries  12-26    143  |  107  |  19  ----------------------------<  118<H>  3.7   |  23  |  0.77    Ca    9.3      26 Dec 2018 08:44  Phos  2.9     12-25  Mg     2.0     12-25        Culture Data    Culture - Urine (collected 23 Dec 2018 01:08)  Source: .Urine Clean Catch (Midstream)  Final Report (24 Dec 2018 23:16):    50,000 - 99,000 CFU/mL Enterococcus faecalis  Organism: Enterococcus faecalis (24 Dec 2018 23:16)  Organism: Enterococcus faecalis (24 Dec 2018 23:16)    Culture - Urine (collected 21 Dec 2018 19:28)  Source: .Urine Clean Catch (Midstream)  Final Report (22 Dec 2018 18:04):    Culture grew 3 or more types of organisms which indicate    collection contamination; consider recollection only if clinically    indicated.

## 2018-12-27 NOTE — PROGRESS NOTE ADULT - SUBJECTIVE AND OBJECTIVE BOX
Doing OK  Spoke with daughter    Vital Signs Last 24 Hrs  T(C): 37.2 (27 Dec 2018 12:56), Max: 37.2 (27 Dec 2018 12:56)  T(F): 99 (27 Dec 2018 12:56), Max: 99 (27 Dec 2018 12:56)  HR: 70 (27 Dec 2018 12:56) (58 - 70)  BP: 129/76 (27 Dec 2018 12:56) (123/70 - 129/76)  BP(mean): --  RR: 18 (27 Dec 2018 12:56) (18 - 18)  SpO2: 95% (27 Dec 2018 12:56) (95% - 96%)    GENERAL: NAD, Comfortable  HEAD:  Atraumatic, Normocephalic  EYES: EOMI, PERRLA, conjunctiva and sclera clear  NECK: Supple, No JVD  CHEST/LUNG: Clear to auscultation bilaterally; No wheeze  HEART: Regular rate and rhythm; No murmurs, rubs, or gallops  ABDOMEN: Soft, Nontender, Nondistended; Bowel sounds present  BACK: no spine tenderness. no bruise  Neuro: AAO x 3, no focal deficits, 5/5 b/l extremities  EXTREMITIES:  2+ Peripheral Pulses, No clubbing, cyanosis, or edema  SKIN: No rashes or lesions      LABS:                        12.6   7.1   )-----------( 224      ( 27 Dec 2018 11:57 )             36.9     12-27    141  |  108  |  22  ----------------------------<  212<H>  4.0   |  19<L>  |  0.73    Ca    9.1      27 Dec 2018 11:55      PT/INR - ( 27 Dec 2018 11:56 )   PT: 22.5 sec;   INR: 1.93 ratio           CAPILLARY BLOOD GLUCOSE

## 2018-12-27 NOTE — PROGRESS NOTE ADULT - PROBLEM SELECTOR PROBLEM 2
Respiratory failure with hypoxia
Respiratory failure with hypoxia
UTI (urinary tract infection)

## 2018-12-27 NOTE — DISCHARGE NOTE ADULT - HOSPITAL COURSE
83F PMHx of Lung cancer s/p resection (remission), melanoma s/p resection (remission), COPD, DVT x2 on coumadin who presents for frequent falls and worsening memory. 83F PMHx of Lung cancer s/p resection (remission), melanoma s/p resection (remission), COPD, DVT x2 on coumadin who presents for frequent falls and worsening memory. CT head shows enlarged ventricles suggesting along with symptoms NPH. Neurology consulted. Clinically this is not NPH and would not recommend further work up for this per Neuro. Vancomycin x 6 doses for E faecalis in urine cx. RVP with +Corona virus. CT chest does not show any infiltrate, has lobe resection in the past. ID consulted. Pt c/o cute midline thoracic back pain. CT thoracic spine shows no acute fracture. Pt for rehab. Outpatient follow up with PMD and  Neurology.

## 2018-12-27 NOTE — PROGRESS NOTE ADULT - PROBLEM SELECTOR PLAN 8
Coumadin
Coumadin 6 mg tonite
Coumadin 6 mg tonite
Dash diet  Keep mg > 2 K>4  NS @80mls/hr  PT eval  Neuro eval
Dash diet  Keep mg > 2 K>4  s/p NS @80mls/hr  PT eval  Neuro eval

## 2018-12-27 NOTE — PROGRESS NOTE ADULT - PROBLEM SELECTOR PLAN 4
CT thoracic spine shows no acute fracture  Tylenol Q6hrs PRN pain  PT eval  Also has coccyx pain and low back pain (more nonspecific in lumbar spine)  CT lumbar spine neg for fracture. Multilevel spondylosis with mild spinal stenosis and foraminal narrowing.  Tylenol as above.
CT thoracic spine shows no acute fracture  Tylenol Q6hrs PRN pain  PT eval  Also has coccyx pain and low back pain (more nonspecific in lumbar spine)  CT lumbar spine neg for fracture. Multilevel spondylosis with mild spinal stenosis and foraminal narrowing.  Tylenol as above.
Continue Coumadin: Coumadin 9mg on Monday and Friday and 6mg every other day.    Daughter not sure when last DVT was but thinks around spring (definitely w/in last 1 year).

## 2018-12-27 NOTE — DISCHARGE NOTE ADULT - MEDICATION SUMMARY - MEDICATIONS TO TAKE
I will START or STAY ON the medications listed below when I get home from the hospital:    acetaminophen 325 mg oral tablet  -- 2 tab(s) by mouth every 6 hours, As needed, Moderate Pain (4 - 6)  -- Indication: For pain    Coumadin 6 mg oral tablet  -- 1 tab(s) by mouth 5 times a week  -- Indication: For DVT (deep venous thrombosis)    Coumadin  -- 9 milligram(s) by mouth 2 times a week Monday and Friday  -- Indication: For DVT (deep venous thrombosis)    Anoro Ellipta 62.5 mcg-25 mcg/inh inhalation powder  -- 1 puff(s) inhaled once a day  -- Indication: For COPD (chronic obstructive pulmonary disease)    cholecalciferol oral tablet  -- 1000 unit(s) by mouth once a day  -- Indication: For supplement

## 2018-12-27 NOTE — PROGRESS NOTE ADULT - PROBLEM SELECTOR PLAN 6
Well controlled on Aero ellipto  Can take her own. d/w the daughter to bring it in.
Daughter says pt does not drink enough.   s/p NS @80mls/hr. will hold to prevent fluid overload.
Daughter says pt does not drink enough. Suspect prerenal as exam supports dehydration with dry MM.   NS @80mls/hr
Well controlled on Aero ellipto  Can take her own. d/w the daughter to bring it in.

## 2018-12-27 NOTE — PROGRESS NOTE ADULT - PROBLEM SELECTOR PROBLEM 8
Need for prophylactic measure
Nutrition, metabolism, and development symptoms
Nutrition, metabolism, and development symptoms

## 2018-12-27 NOTE — PROGRESS NOTE ADULT - PROBLEM SELECTOR PLAN 9
Dash diet  Keep mg > 2 K>4  s/p NS @80mls/hr  PT eval

## 2018-12-27 NOTE — PROGRESS NOTE ADULT - PROBLEM SELECTOR PROBLEM 1
Normal pressure hydrocephalus

## 2018-12-27 NOTE — PROGRESS NOTE ADULT - PROBLEM SELECTOR PLAN 2
RVP with +Corona virus. Has nasal congestion past few days.   CT chest does not show any infiltrate, has lobe resection in the past.  Could be 2/2 pain in the thoracic spine impeding full breaths  Pain control- pt wants Tylenol  incentive spirometry
RVP with +Corona virus. Has nasal congestion past few days.   CT chest does not show any infiltrate, has lobe resection in the past.  Could be 2/2 pain in the thoracic spine impeding full breaths  Pain control- pt wants Tylenol  incentive spirometry
appreciate ID  no further antibx needed at this point
ceftriaxone  f/u urine cultures
vancomycin x 6 doses for E faecalis in urine cx
vancomycin x 6 doses for E faecalis in urine cx  she does not have IV access yet
switched to vancomycin for E faecalis in urine cx

## 2018-12-27 NOTE — DISCHARGE NOTE ADULT - PLAN OF CARE
Symptoms improved Follow up with Neurologist- Dr. Gongora () resolved Follow up with your Primary care doctor in 1 week Call you Health care provider upon arrival home to make a one week follow up appointment.  If you develop fever, chills, malaise, or change in mental status call your Health Care Provider or go to the Emergency Department.  Nutrition is important, eat small frequent meals to help ensure you get adequate calories.  Do not stay in bed all day!  Increase your activity daily as tolerated. Physical therapy in Rehab Continue Coumadin as prescribed

## 2018-12-27 NOTE — PROGRESS NOTE ADULT - PROBLEM SELECTOR PROBLEM 4
Acute midline thoracic back pain
Acute midline thoracic back pain
DVT (deep venous thrombosis)

## 2018-12-27 NOTE — PROGRESS NOTE ADULT - PROBLEM SELECTOR PLAN 3
Continue Coumadin: Coumadin 9mg on Monday and Friday and 6mg every other day.    Daughter not sure when last DVT was but thinks around spring (definitely w/in last 1 year).
Continue Coumadin: Coumadin 9mg on Monday and Friday and 6mg every other day.    Daughter not sure when last DVT was but thinks around spring (definitely w/in last 1 year).
RVP with +Corona virus. Has nasal congestion past few days.   CT chest does not show any infiltrate, has lobe resection in the past.  Could be 2/2 pain in the thoracic spine impeding full breaths  Pain control- pt wants Tylenol, nothing stronger  incentive spirometry

## 2018-12-27 NOTE — DISCHARGE NOTE ADULT - PATIENT PORTAL LINK FT
You can access the [facility] Patient Portal, offered by [facility], by registering with the following website: [email or web address]

## 2018-12-27 NOTE — DISCHARGE NOTE ADULT - CARE PLAN
Principal Discharge DX:	Normal pressure hydrocephalus  Goal:	Symptoms improved  Assessment and plan of treatment:	Follow up with Neurologist- Dr. Gongora ()  Secondary Diagnosis:	KRYSTLE (acute kidney injury)  Goal:	resolved  Assessment and plan of treatment:	Follow up with your Primary care doctor in 1 week  Secondary Diagnosis:	UTI (urinary tract infection)  Assessment and plan of treatment:	Call you Health care provider upon arrival home to make a one week follow up appointment.  If you develop fever, chills, malaise, or change in mental status call your Health Care Provider or go to the Emergency Department.  Nutrition is important, eat small frequent meals to help ensure you get adequate calories.  Do not stay in bed all day!  Increase your activity daily as tolerated.  Secondary Diagnosis:	Acute midline thoracic back pain  Assessment and plan of treatment:	Physical therapy in Rehab  Secondary Diagnosis:	Afib Principal Discharge DX:	Normal pressure hydrocephalus  Goal:	Symptoms improved  Assessment and plan of treatment:	Follow up with Neurologist- Dr. Gongora ()  Secondary Diagnosis:	KRYSTLE (acute kidney injury)  Goal:	resolved  Assessment and plan of treatment:	Follow up with your Primary care doctor in 1 week  Secondary Diagnosis:	UTI (urinary tract infection)  Assessment and plan of treatment:	Call you Health care provider upon arrival home to make a one week follow up appointment.  If you develop fever, chills, malaise, or change in mental status call your Health Care Provider or go to the Emergency Department.  Nutrition is important, eat small frequent meals to help ensure you get adequate calories.  Do not stay in bed all day!  Increase your activity daily as tolerated.  Secondary Diagnosis:	Afib  Assessment and plan of treatment:	Physical therapy in Rehab  Secondary Diagnosis:	Acute midline thoracic back pain  Assessment and plan of treatment:	Continue Coumadin as prescribed

## 2018-12-27 NOTE — PROGRESS NOTE ADULT - PROBLEM SELECTOR PROBLEM 3
DVT (deep venous thrombosis)
DVT (deep venous thrombosis)
Respiratory failure with hypoxia

## 2018-12-28 VITALS
DIASTOLIC BLOOD PRESSURE: 71 MMHG | RESPIRATION RATE: 18 BRPM | TEMPERATURE: 98 F | HEART RATE: 68 BPM | OXYGEN SATURATION: 95 % | SYSTOLIC BLOOD PRESSURE: 149 MMHG

## 2018-12-28 LAB
INR BLD: 1.74 RATIO — HIGH (ref 0.88–1.16)
PROTHROM AB SERPL-ACNC: 20.3 SEC — HIGH (ref 10–12.9)

## 2018-12-28 PROCEDURE — 85730 THROMBOPLASTIN TIME PARTIAL: CPT

## 2018-12-28 PROCEDURE — 85610 PROTHROMBIN TIME: CPT

## 2018-12-28 PROCEDURE — 80053 COMPREHEN METABOLIC PANEL: CPT

## 2018-12-28 PROCEDURE — 97116 GAIT TRAINING THERAPY: CPT

## 2018-12-28 PROCEDURE — 87086 URINE CULTURE/COLONY COUNT: CPT

## 2018-12-28 PROCEDURE — 99285 EMERGENCY DEPT VISIT HI MDM: CPT

## 2018-12-28 PROCEDURE — 83735 ASSAY OF MAGNESIUM: CPT

## 2018-12-28 PROCEDURE — 71250 CT THORAX DX C-: CPT

## 2018-12-28 PROCEDURE — 81001 URINALYSIS AUTO W/SCOPE: CPT

## 2018-12-28 PROCEDURE — 84100 ASSAY OF PHOSPHORUS: CPT

## 2018-12-28 PROCEDURE — 97530 THERAPEUTIC ACTIVITIES: CPT

## 2018-12-28 PROCEDURE — 97162 PT EVAL MOD COMPLEX 30 MIN: CPT

## 2018-12-28 PROCEDURE — 87581 M.PNEUMON DNA AMP PROBE: CPT

## 2018-12-28 PROCEDURE — 70450 CT HEAD/BRAIN W/O DYE: CPT

## 2018-12-28 PROCEDURE — 87633 RESP VIRUS 12-25 TARGETS: CPT

## 2018-12-28 PROCEDURE — 85027 COMPLETE CBC AUTOMATED: CPT

## 2018-12-28 PROCEDURE — 84484 ASSAY OF TROPONIN QUANT: CPT

## 2018-12-28 PROCEDURE — 87798 DETECT AGENT NOS DNA AMP: CPT

## 2018-12-28 PROCEDURE — 73502 X-RAY EXAM HIP UNI 2-3 VIEWS: CPT

## 2018-12-28 PROCEDURE — 93005 ELECTROCARDIOGRAM TRACING: CPT

## 2018-12-28 PROCEDURE — 72131 CT LUMBAR SPINE W/O DYE: CPT

## 2018-12-28 PROCEDURE — 80048 BASIC METABOLIC PNL TOTAL CA: CPT

## 2018-12-28 PROCEDURE — 87486 CHLMYD PNEUM DNA AMP PROBE: CPT

## 2018-12-28 PROCEDURE — 87186 SC STD MICRODIL/AGAR DIL: CPT

## 2018-12-28 RX ADMIN — Medication 1000 UNIT(S): at 11:04

## 2018-12-28 NOTE — PROGRESS NOTE ADULT - ASSESSMENT
83F PMHx of Lung cancer s/p resection (remission), melanoma s/p resection (remission), COPD, DVT x2 on coumadin who presents for frequent falls and worsening memory.
Falls  Bacteruria, no symptoms per patient and got 'better' without any anti-enterococcal therapy. As such I see no need for antibiotics vs. the urinary isolate specifically.  Evidence of normal pressure hydrocephalus on CT, new since 2/2018 based on report though neurology states changes are old they do not believe the patient has NPH.   +RVP viral respiratory infection though patient denies symptoms.   Decline in performance status seems subacute with acute decline.  Patient denies any symptomatology whatsoever, making the significance of the RVP and urine cx nebulous  Discussed with daughter by phone at patient's request. All questions answered to the best of my ability.  Daughter concerned that patient needs rehab and I agree.  Patient is dismissive. Discussed with her bluntly risk of falls on coumadin. She remains dismissive.      Suggestions--  Defer antibiotics    Rodrigo Hurley MD  [phone]
Falls  Bacteruria, no symptoms per patient and got 'better' without any anti-enterococcal therapy. As such I see no need for antibiotics vs. the urinary isolate specifically.  Evidence of normal pressure hydrocephalus on CT, new since 2/2018 based on report though neurology states changes are old they do not believe the patient has NPH.   +RVP viral respiratory infection though patient denies symptoms.   Patient denies any symptomatology whatsoever, making the significance of the RVP and urine cx nebulous  Patient now resigned to going to rehab, even though she does not think she needs it.      Suggestions--  Defer antibiotics  I'll sign off at this time.    Rodrigo Hurley MD  [phone]
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   Pt followed in our office by Dr. Rosenstein, last seen in 2017. Had MRI brain at that time with moderate atrophy with proportionate ventriculomegaly. Also CT head during February admission 2/2018 with ventriculomegaly ex vacuo. At baseline pt walks with walker. Lives in 2 family home with daughter. Pt had 2 falls in past 2 days, latter one yesterday out of bed when she went to grab something and did not use her cane or walker. Fell backwards hit head, denies any syncope. Says she lost her balance but not entirely clear why she fell. Fall was unwitnessed. Found on floor by son and daughter. PMD referred to ED. CT head no acute changes. Ventricular size stable. CT TLS spine no trauma, fracture. INR was 1.89, today 2.11. Daughter Barrington reports she has been having some worsening short term memory over the past year. Also getting hearing aides.    Pt found to have + RVP, corona virus.     A/P: Suspect falls secondary to underlining multifactorial gait disorder (cognitive impairment, neuropathy, osteoarthritis) decompensated in setting of +respiratory virus, +UA.   INR was slightly subtherapeutic 1.89 but doubt acute neurological event such as TIA and today therapeutic at 2.11.  Clinically this is not NPH and would not recommend further work up for this.     Neuro stable    Plan  1. Supportive care for +RVP, Abx for UTI, follow up culture  2. Continue PT recommending rehab  3. Fall precautions  4. Frequent reorientation, at risk for hospital delirium  5. Continue AC for stroke prophylaxis  No further inpatient neuro testing at this time.   Will follow  Reviewed plan with medicine NP.
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   Pt followed in our office by Dr. Rosenstein, last seen in 2017. Had MRI brain at that time with moderate atrophy with proportionate ventriculomegaly. Also CT head during February admission 2/2018 with ventriculomegaly ex vacuo. At baseline pt walks with walker. Lives in 2 family home with daughter. Pt had 2 falls in past 2 days, latter one yesterday out of bed when she went to grab something and did not use her cane or walker. Fell backwards hit head, denies any syncope. Says she lost her balance but not entirely clear why she fell. Fall was unwitnessed. Found on floor by son and daughter. PMD referred to ED. CT head no acute changes. Ventricular size stable. CT TLS spine no trauma, fracture. INR was 1.89, today 2.11. Daughter Barrington reports she has been having some worsening short term memory over the past year. Also getting hearing aides.    Pt found to have + RVP, corona virus.     A/P: Suspect falls secondary to underlining multifactorial gait disorder (cognitive impairment, neuropathy, osteoarthritis) decompensated in setting of +respiratory virus, +UA.   INR was slightly subtherapeutic 1.89 but doubt acute neurological event such as TIA and today therapeutic at 2.11.  Clinically this is not NPH and would not recommend further work up for this.     Neuro stable    Plan  1. Supportive care for +RVP, Abx for UTI, follow up culture.   2. Continue PT recommending rehab  3. Fall precautions  4. Frequent reorientation, at risk for hospital delirium  5. Continue AC for stroke prophylaxis  No further inpatient neuro testing at this time.   d/w daughter by phone  please recall as needed
83F PMHx of Lung cancer s/p resection (remission), melanoma s/p resection (remission), COPD, DVT x2 on coumadin who presents for frequent falls and worsening memory.

## 2018-12-28 NOTE — PROGRESS NOTE ADULT - SUBJECTIVE AND OBJECTIVE BOX
[facility], Division of Infectious Diseases  MAYA Pyle A. Lee  [phone]    Name: MARU VILLASEÑOR  Age: 83y  Gender: Female  MRN: [id number]    Interval History--  Notes reviewed. No medical complaints. Demeanor unchanged.     Past Medical History--  Breast cancer  Lung cancer, left  Diabetes mellitus  Afib  S/P colectomy  Status post left hip replacement  S/P lobectomy of lung      For details regarding the patient's social history, family history, and other miscellaneous elements, please refer the initial infectious diseases consultation and/or the admitting history and physical examination for this admission.    Allergies    codeine (Fever; Rash)  contrast media (iodine-based) (Other)    Intolerances        Medications--  Antibiotics:    Immunologic:    Other:  acetaminophen   Tablet .. PRN  cholecalciferol      Review of Systems--  A 10-point review of systems was obtained.  Review of systems otherwise unchanged compared to prior visit except as previously noted.    Physical Examination--  Vital Signs: T(F): 98.3 (12-28-18 @ 12:39), Max: 99 (12-27-18 @ 12:56)  HR: 68 (12-28-18 @ 12:39)  BP: 149/71 (12-28-18 @ 12:39)  RR: 18 (12-28-18 @ 12:39)  SpO2: 95% (12-28-18 @ 12:39)  Wt(kg): --  General: Nontoxic-appearing Female in no acute distress.  HEENT: AT/NC. Anicteric. Conjunctiva pink and moist. Oropharynx clear.   Neck: Not rigid. No sense of mass.  Nodes: None palpable.  Lungs: Clear bilaterally without rales, wheezing or rhonchi  Heart: Regular rate and rhythm. No Murmur. No rub. No gallop. No palpable thrill.  Abdomen: Bowel sounds present and normoactive. Soft. Mildly distended. Nontender. No sense of mass. No organomegaly.  Back: No spinal tenderness. No costovertebral angle tenderness.   Extremities: No cyanosis or clubbing. 1-2+ LE edema.   Skin: Warm. Dry. Good turgor. No rash. No vasculitic stigmata.  Psychiatric: Unchanged.      Laboratory Studies--  CBC                        12.6   7.1   )-----------( 224      ( 27 Dec 2018 11:57 )             36.9       Chemistries  12-27    141  |  108  |  22  ----------------------------<  212<H>  4.0   |  19<L>  |  0.73    Ca    9.1      27 Dec 2018 11:55        Culture Data    Culture - Urine (collected 23 Dec 2018 01:08)  Source: .Urine Clean Catch (Midstream)  Final Report (24 Dec 2018 23:16):    50,000 - 99,000 CFU/mL Enterococcus faecalis  Organism: Enterococcus faecalis (24 Dec 2018 23:16)  Organism: Enterococcus faecalis (24 Dec 2018 23:16)    Culture - Urine (collected 21 Dec 2018 19:28)  Source: .Urine Clean Catch (Midstream)  Final Report (22 Dec 2018 18:04):    Culture grew 3 or more types of organisms which indicate    collection contamination; consider recollection only if clinically    indicated.

## 2019-01-01 NOTE — ED ADULT NURSE NOTE - ED STAT RN HANDOFF DETAILS
Cord results reviewed and noted to be negative for all. No needs.      LG
report given to Lachelle LESTER

## 2019-02-08 ENCOUNTER — APPOINTMENT (OUTPATIENT)
Dept: GERIATRICS | Facility: CLINIC | Age: 84
End: 2019-02-08
Payer: MEDICARE

## 2019-02-08 VITALS
SYSTOLIC BLOOD PRESSURE: 136 MMHG | TEMPERATURE: 98 F | OXYGEN SATURATION: 96 % | DIASTOLIC BLOOD PRESSURE: 64 MMHG | WEIGHT: 148 LBS | BODY MASS INDEX: 25.4 KG/M2 | HEART RATE: 68 BPM

## 2019-02-08 DIAGNOSIS — Z60.2 PROBLEMS RELATED TO LIVING ALONE: ICD-10-CM

## 2019-02-08 LAB
BASOPHILS # BLD AUTO: 0.01 K/UL
BASOPHILS NFR BLD AUTO: 0.1 %
EOSINOPHIL # BLD AUTO: 0 K/UL
EOSINOPHIL NFR BLD AUTO: 0 %
HCT VFR BLD CALC: 44.9 %
HGB BLD-MCNC: 14.1 G/DL
IMM GRANULOCYTES NFR BLD AUTO: 0.1 %
INR PPP: 2.93 RATIO
LYMPHOCYTES # BLD AUTO: 1.93 K/UL
LYMPHOCYTES NFR BLD AUTO: 23.5 %
MAN DIFF?: NORMAL
MCHC RBC-ENTMCNC: 29.3 PG
MCHC RBC-ENTMCNC: 31.4 GM/DL
MCV RBC AUTO: 93.2 FL
MONOCYTES # BLD AUTO: 0.59 K/UL
MONOCYTES NFR BLD AUTO: 7.2 %
NEUTROPHILS # BLD AUTO: 5.69 K/UL
NEUTROPHILS NFR BLD AUTO: 69.1 %
PLATELET # BLD AUTO: 203 K/UL
PT BLD: 33.9 SEC
RBC # BLD: 4.82 M/UL
RBC # FLD: 14.4 %
WBC # FLD AUTO: 8.23 K/UL

## 2019-02-08 PROCEDURE — 99205 OFFICE O/P NEW HI 60 MIN: CPT | Mod: GC

## 2019-02-08 SDOH — SOCIAL STABILITY - SOCIAL INSECURITY: PROBLEMS RELATED TO LIVING ALONE: Z60.2

## 2019-02-08 NOTE — HISTORY OF PRESENT ILLNESS
[0] : 2) Feeling down, depressed, or hopeless: Not at all [FreeTextEntry1] : 83 year old woman, retired , with a PMH of lung CA, DVT (on coumadin), post-operative afib (2014, post-lobectomy) and AD who presents following several falls. Over the past year, she had 4-6 falls. The most recent fall occurred about 2 months ago. At this time, the patient reports falling trying to get out of bed. She was on the floor for about 2.5 hours before she was found. She did not have her cell phone or LifeAlert on her at that point. She was hospitalized and sent to rehabilitation following this fall. She denies loss of consciousness, light headedness, or dizziness following the fall. Since discharge from rehabilitation [facility] two weeks ago, she has not had any falls. \par \par The patient lives alone in two family house. Her daughter lives below her. She is independent in her ADLs, but requires assistance with most of her iADLs. She no longer drives since she got lost in her neighborhood while driving. Patient has a  who comes for several hours 4 days a week.\par \par The patient is a former smoker (stopped 15 years, 4 packs/day for many years), former EtOH user, but does not drugs.

## 2019-02-08 NOTE — END OF VISIT
[] : Resident [FreeTextEntry3] : 84 yo woman, retired , former smoker, with a PMH of lung CA, DVT (on Coumadin), post-operative afib (2014, post-lobectomy) and AD who presents following several falls. Over the past year, she had 4-6 falls. The most recent fall occurred about 2 months ago. At this time, the patient reports falling trying to get out of bed. She was on the floor for about 2.5 hours before she was found. She was hospitalized at [place] and sent to [facility]  rehabilitation following this fall. She denies loss of consciousness, light headedness, or dizziness following the fall. Since discharge from rehabilitation two weeks ago, she has not had any falls. The patient lives alone in two family house. Her daughter lives in the lower level. She is independent in her ADLs, but requires assistance with most of her iADLs. She no longer drives since she got lost in her neighborhood while driving. Patient has a  who comes for several hours 4 days a week.\par PE Very alert and jovial woman in no distress. Orthostatics negative . Lungs minimal crackles in left lower lobe, Rt wnl, heart RSR, no arrhythmia appreciated, abdomen supple, no focal neuro, gait s steady no dizziness reported while ambulating. MMSE 26/30\par Imp/Plan: patient with early cognitive changes, very impulsive. Would recommend to family use of NEST cameras and supervision in meds\par 2) called Dr Shanks in view of protracted hx of recurrent DVT to discuss possible D/C Coumadin. However Dr Shanks wants to keep patient on Coumadin. Since she needs close monitoring, at least once a week, referred to Coumadin clinic in cardiology. Will draw INR baseline today. daughter is familiar with Coumadin clinic which is next to dr Shanks office and will contact them today.\par Also suggested Life Alert falls monitoring since patient has had multiple falls\par #) Lab work today including TSH\par \par \par

## 2019-02-08 NOTE — PHYSICAL EXAM
[General Appearance - Alert] : alert [General Appearance - In No Acute Distress] : in no acute distress [General Appearance - Well Nourished] : well nourished [General Appearance - Well Developed] : well developed [PERRL With Normal Accommodation] : pupils were equal in size, round, and reactive to light [Extraocular Movements] : extraocular movements were intact [Neck Appearance] : the appearance of the neck was normal [Respiration, Rhythm And Depth] : normal respiratory rhythm and effort [Exaggerated Use Of Accessory Muscles For Inspiration] : no accessory muscle use [Heart Rate And Rhythm] : heart rate was normal and rhythm regular [Heart Sounds] : normal S1 and S2 [Heart Sounds Gallop] : no gallops [Murmurs] : no murmurs [Heart Sounds Pericardial Friction Rub] : no pericardial rub [Arterial Pulses Carotid] : carotid pulses were normal with no bruits [Full Pulse] : the pedal pulses are present [Edema] : there was no peripheral edema [Breast Appearance] : normal in appearance [Breast Palpation Mass] : no palpable masses [Bowel Sounds] : normal bowel sounds [Abdomen Soft] : soft [Abdomen Tenderness] : non-tender [No CVA Tenderness] : no ~M costovertebral angle tenderness [Abnormal Walk] : normal gait [Musculoskeletal - Swelling] : no joint swelling seen [Skin Color & Pigmentation] : normal skin color and pigmentation [] : no rash [No Focal Deficits] : no focal deficits [Total Score ___ / 30] : the patient achieved a score of [unfilled] /30 [Date / Time ___ / 5] : date / time [unfilled] / 5 [Place ___ / 5] : place [unfilled] / 5 [Registration ___ / 3] : registration [unfilled] / 3 [Serial Sevens ___/5] : serial sevens [unfilled] / 5 [Naming 2 Objects ___ / 2] : naming two objects [unfilled] / 2 [Repeating a Sentence ___ / 1] : repeating a sentence [unfilled] / 1 [Writing a Sentence ___ / 1] : write sentence [unfilled] / 1 [3-stage Verbal Command ___ / 3] : three-stage verbal command [unfilled] / 3 [Written Command ___ / 1] : written command [unfilled] / 1 [Copy a Design ___ / 1] : copy a design [unfilled] / 1 [Recall ___ / 3] : recall [unfilled] / 3 [FreeTextEntry1] : No erythema, warmth

## 2019-02-08 NOTE — REASON FOR VISIT
[Initial Evaluation] : an initial evaluation [Family Member] : family member [FreeTextEntry1] : Establish Care, multiple falls

## 2019-02-08 NOTE — REVIEW OF SYSTEMS
[Dizziness] : dizziness [Fever] : no fever [Chills] : no chills [Shortness Of Breath] : no shortness of breath [Wheezing] : no wheezing [Cough] : no cough [Abdominal Pain] : no abdominal pain [Vomiting] : no vomiting [Constipation] : no constipation [Diarrhea] : no diarrhea [Confused] : no confusion

## 2019-02-08 NOTE — SOCIAL HISTORY
[Two or more falls in past year] : Patient reported two or more falls in the past year [Fully functional (bathing, dressing, toileting, transferring, walking, feeding)] : Fully functional (bathing, dressing, toileting, transferring, walking, feeding) [Independent] : using transportation [Full assistance needed] : managing finances [Canes] : raghav [Some assistance needed] : managing medications [FreeTextEntry1] : Olimpia Ibrahim ([phone]) [FreeTextEntry2] : Daughter [FreeTextEntry3] : No [FreeTextEntry4] : Fair

## 2019-02-11 LAB
ALBUMIN SERPL ELPH-MCNC: 4.2 G/DL
ALP BLD-CCNC: 74 U/L
ALT SERPL-CCNC: 9 U/L
ANION GAP SERPL CALC-SCNC: 12 MMOL/L
AST SERPL-CCNC: 15 U/L
BILIRUB SERPL-MCNC: 0.4 MG/DL
BUN SERPL-MCNC: 22 MG/DL
CALCIUM SERPL-MCNC: 9.9 MG/DL
CHLORIDE SERPL-SCNC: 108 MMOL/L
CO2 SERPL-SCNC: 25 MMOL/L
CREAT SERPL-MCNC: 1.02 MG/DL
GLUCOSE SERPL-MCNC: 101 MG/DL
POTASSIUM SERPL-SCNC: 4.5 MMOL/L
PROT SERPL-MCNC: 7.4 G/DL
SODIUM SERPL-SCNC: 145 MMOL/L

## 2019-02-21 LAB
INR PPP: 2.22 RATIO
PT BLD: 25.7 SEC

## 2019-02-22 ENCOUNTER — APPOINTMENT (OUTPATIENT)
Dept: CARDIOLOGY | Facility: CLINIC | Age: 84
End: 2019-02-22

## 2019-03-06 ENCOUNTER — APPOINTMENT (OUTPATIENT)
Dept: INTERNAL MEDICINE | Facility: CLINIC | Age: 84
End: 2019-03-06
Payer: MEDICARE

## 2019-03-06 ENCOUNTER — APPOINTMENT (OUTPATIENT)
Dept: INTERNAL MEDICINE | Facility: CLINIC | Age: 84
End: 2019-03-06

## 2019-03-06 VITALS — HEART RATE: 71 BPM | RESPIRATION RATE: 17 BRPM | OXYGEN SATURATION: 99 %

## 2019-03-06 LAB
INR PPP: 3.4 RATIO
POCT-PROTHROMBIN TIME: 38.1 SECS
QUALITY CONTROL: YES

## 2019-03-06 PROCEDURE — 93793 ANTICOAG MGMT PT WARFARIN: CPT

## 2019-03-06 PROCEDURE — 85610 PROTHROMBIN TIME: CPT | Mod: QW

## 2019-03-06 RX ORDER — WARFARIN 6 MG/1
6 TABLET ORAL
Qty: 90 | Refills: 1 | Status: DISCONTINUED | COMMUNITY
Start: 2018-04-20 | End: 2019-03-06

## 2019-03-11 ENCOUNTER — APPOINTMENT (OUTPATIENT)
Dept: INTERNAL MEDICINE | Facility: CLINIC | Age: 84
End: 2019-03-11
Payer: MEDICARE

## 2019-03-11 VITALS — RESPIRATION RATE: 18 BRPM | OXYGEN SATURATION: 99 % | HEART RATE: 70 BPM

## 2019-03-11 LAB
INR PPP: 2.7 RATIO
POCT-PROTHROMBIN TIME: 32.1 SECS
QUALITY CONTROL: YES

## 2019-03-11 PROCEDURE — 85610 PROTHROMBIN TIME: CPT | Mod: QW

## 2019-03-11 PROCEDURE — 93793 ANTICOAG MGMT PT WARFARIN: CPT

## 2019-03-18 ENCOUNTER — APPOINTMENT (OUTPATIENT)
Dept: INTERNAL MEDICINE | Facility: CLINIC | Age: 84
End: 2019-03-18
Payer: MEDICARE

## 2019-03-18 VITALS — HEART RATE: 71 BPM | RESPIRATION RATE: 19 BRPM | OXYGEN SATURATION: 99 %

## 2019-03-18 LAB
INR PPP: 2.3 RATIO
POCT-PROTHROMBIN TIME: 27.1 SECS
QUALITY CONTROL: YES

## 2019-03-18 PROCEDURE — 93793 ANTICOAG MGMT PT WARFARIN: CPT

## 2019-03-18 PROCEDURE — 85610 PROTHROMBIN TIME: CPT | Mod: QW

## 2019-03-27 ENCOUNTER — APPOINTMENT (OUTPATIENT)
Dept: INTERNAL MEDICINE | Facility: CLINIC | Age: 84
End: 2019-03-27
Payer: MEDICARE

## 2019-03-27 VITALS — OXYGEN SATURATION: 98 % | HEART RATE: 74 BPM | RESPIRATION RATE: 16 BRPM

## 2019-03-27 LAB
INR PPP: 2.6 RATIO
POCT-PROTHROMBIN TIME: 31.6 SECS
QUALITY CONTROL: YES

## 2019-03-27 PROCEDURE — 93793 ANTICOAG MGMT PT WARFARIN: CPT

## 2019-03-27 PROCEDURE — 85610 PROTHROMBIN TIME: CPT | Mod: QW

## 2019-03-29 ENCOUNTER — APPOINTMENT (OUTPATIENT)
Dept: CARDIOLOGY | Facility: CLINIC | Age: 84
End: 2019-03-29
Payer: MEDICARE

## 2019-03-29 VITALS
SYSTOLIC BLOOD PRESSURE: 147 MMHG | DIASTOLIC BLOOD PRESSURE: 82 MMHG | HEART RATE: 77 BPM | BODY MASS INDEX: 25.27 KG/M2 | WEIGHT: 148 LBS | HEIGHT: 64 IN | OXYGEN SATURATION: 95 %

## 2019-03-29 PROCEDURE — 99214 OFFICE O/P EST MOD 30 MIN: CPT

## 2019-03-29 RX ORDER — ENOXAPARIN SODIUM 150 MG/ML
120 INJECTION SUBCUTANEOUS
Qty: 5 | Refills: 0 | Status: DISCONTINUED | COMMUNITY
Start: 2018-01-31 | End: 2019-03-29

## 2019-03-29 NOTE — PHYSICAL EXAM
[General Appearance - Well Developed] : well developed [Normal Conjunctiva] : the conjunctiva exhibited no abnormalities [Normal Jugular Venous V Waves Present] : normal jugular venous V waves present [] : no respiratory distress [Heart Sounds] : normal S1 and S2 [Abdomen Soft] : soft [Nail Clubbing] : no clubbing of the fingernails [FreeTextEntry1] : b/l edema 2 + [No Skin Ulcers] : no skin ulcer [Oriented To Time, Place, And Person] : oriented to person, place, and time

## 2019-03-29 NOTE — REASON FOR VISIT
[FreeTextEntry1] : 82 /F with PMH of DM, malignant melanoma, lung cancer s/p left lower lobe lobectomy, R popliteal and femoral DVT s/p anticoagulation, postop atrial fibrillation who now comes in for follow-up after being hospitalized for DVT. She was sent in from Vascular clinic last week and extensive DVT was found, requiring inpatient admission.\par She reports that her leg swelling has dramatically improved. No more leg pain. She is wearing her stockings and taking her Coumadin at a regular time. \par DVT on coumadin.

## 2019-03-29 NOTE — DISCUSSION/SUMMARY
[Patient] : the patient [FreeTextEntry1] : \par h/o Lung CA \par prior malignant melanoma in remission \par Recent sepisis. \par UTI-recently\par DVT--on coumadin. \par \par Plan:\par -continue coumadin. \par -FU with neuro. --rosenstein \par -Fu with internal medicine (Jesika)

## 2019-04-08 ENCOUNTER — APPOINTMENT (OUTPATIENT)
Dept: INTERNAL MEDICINE | Facility: CLINIC | Age: 84
End: 2019-04-08
Payer: MEDICARE

## 2019-04-08 VITALS — RESPIRATION RATE: 17 BRPM | HEART RATE: 67 BPM | OXYGEN SATURATION: 94 %

## 2019-04-08 LAB
INR PPP: 2.5 RATIO
POCT-PROTHROMBIN TIME: 23.9 SECS
QUALITY CONTROL: YES

## 2019-04-08 PROCEDURE — 85610 PROTHROMBIN TIME: CPT | Mod: QW

## 2019-04-08 PROCEDURE — 93793 ANTICOAG MGMT PT WARFARIN: CPT

## 2019-04-22 ENCOUNTER — APPOINTMENT (OUTPATIENT)
Dept: INTERNAL MEDICINE | Facility: CLINIC | Age: 84
End: 2019-04-22
Payer: MEDICARE

## 2019-04-22 VITALS — HEART RATE: 64 BPM | OXYGEN SATURATION: 91 % | RESPIRATION RATE: 16 BRPM

## 2019-04-22 LAB
INR PPP: 2.1 RATIO
POCT-PROTHROMBIN TIME: 25.4 SECS
QUALITY CONTROL: YES

## 2019-04-22 PROCEDURE — 85610 PROTHROMBIN TIME: CPT | Mod: QW

## 2019-04-22 PROCEDURE — 93793 ANTICOAG MGMT PT WARFARIN: CPT

## 2019-05-06 ENCOUNTER — APPOINTMENT (OUTPATIENT)
Dept: INTERNAL MEDICINE | Facility: CLINIC | Age: 84
End: 2019-05-06
Payer: MEDICARE

## 2019-05-06 ENCOUNTER — RESULT CHARGE (OUTPATIENT)
Age: 84
End: 2019-05-06

## 2019-05-06 VITALS — OXYGEN SATURATION: 89 % | HEART RATE: 61 BPM | RESPIRATION RATE: 17 BRPM

## 2019-05-06 LAB
INR PPP: 2.4 RATIO
POCT-PROTHROMBIN TIME: 22.5 SECS
QUALITY CONTROL: YES

## 2019-05-06 PROCEDURE — 93793 ANTICOAG MGMT PT WARFARIN: CPT

## 2019-05-06 PROCEDURE — 85610 PROTHROMBIN TIME: CPT | Mod: QW

## 2019-05-28 ENCOUNTER — APPOINTMENT (OUTPATIENT)
Dept: GERIATRICS | Facility: CLINIC | Age: 84
End: 2019-05-28
Payer: MEDICARE

## 2019-05-28 VITALS
HEIGHT: 64 IN | TEMPERATURE: 97.6 F | WEIGHT: 150.13 LBS | RESPIRATION RATE: 15 BRPM | OXYGEN SATURATION: 97 % | BODY MASS INDEX: 25.63 KG/M2 | SYSTOLIC BLOOD PRESSURE: 100 MMHG | HEART RATE: 75 BPM | DIASTOLIC BLOOD PRESSURE: 60 MMHG

## 2019-05-28 VITALS — DIASTOLIC BLOOD PRESSURE: 60 MMHG | SYSTOLIC BLOOD PRESSURE: 100 MMHG

## 2019-05-28 DIAGNOSIS — R32 UNSPECIFIED URINARY INCONTINENCE: ICD-10-CM

## 2019-05-28 DIAGNOSIS — Z79.01 LONG TERM (CURRENT) USE OF ANTICOAGULANTS: ICD-10-CM

## 2019-05-28 DIAGNOSIS — Z87.898 PERSONAL HISTORY OF OTHER SPECIFIED CONDITIONS: ICD-10-CM

## 2019-05-28 DIAGNOSIS — M79.602 PAIN IN LEFT ARM: ICD-10-CM

## 2019-05-28 PROCEDURE — 99215 OFFICE O/P EST HI 40 MIN: CPT

## 2019-05-28 RX ORDER — FUROSEMIDE 20 MG/1
20 TABLET ORAL DAILY
Qty: 30 | Refills: 0 | Status: DISCONTINUED | COMMUNITY
Start: 2017-06-19 | End: 2019-05-28

## 2019-05-29 LAB
INR PPP: 2.19 RATIO
PT BLD: 25.8 SEC

## 2019-05-30 NOTE — ED ADULT NURSE NOTE - OBJECTIVE STATEMENT
INTERVAL HPI/OVERNIGHT EVENTS:  pt seen and examined, wife present  c/o nausea and non bloody vomiting  denies abd pain  moving his bowels  now w shingles    MEDICATIONS  (STANDING):  enoxaparin Injectable 40 milliGRAM(s) SubCutaneous daily  fluconAZOLE IVPB      fluconAZOLE IVPB 200 milliGRAM(s) IV Intermittent every 24 hours  mesalamine DR Capsule 1200 milliGRAM(s) Oral four times a day  pantoprazole   Suspension 40 milliGRAM(s) Oral two times a day before meals  predniSONE   Tablet 20 milliGRAM(s) Oral daily  sodium chloride 0.9%. 1000 milliLiter(s) (75 mL/Hr) IV Continuous <Continuous>  sucralfate suspension 1 Gram(s) Oral two times a day  traZODone 50 milliGRAM(s) Oral at bedtime  valACYclovir 1000 milliGRAM(s) Oral three times a day    MEDICATIONS  (PRN):  acetaminophen   Tablet .. 650 milliGRAM(s) Oral every 6 hours PRN Temp greater or equal to 38C (100.4F)  diazepam    Tablet 5 milliGRAM(s) Oral at bedtime PRN insomnia  ondansetron Injectable 4 milliGRAM(s) IV Push every 6 hours PRN Nausea and/or Vomiting      Allergies    penicillin (Swelling)    Intolerances        Review of Systems:    General:  No wt loss, fevers, chills, night sweats, fatigue   Eyes:  Good vision, no reported pain  ENT:  No sore throat, pain, runny nose, dysphagia  CV:  No pain, palpitations, hypo/hypertension  Resp:  No dyspnea, cough, tachypnea, wheezing  GI:  No pain, +nausea, +vomiting, No diarrhea, No constipation, No weight loss, No fever, No pruritis, No rectal bleeding, No melena, No dysphagia  :  No pain, bleeding, incontinence, nocturia  Muscle:  No pain, weakness  Neuro:  No weakness, tingling, memory problems  Psych:  No fatigue, insomnia, mood problems, depression  Endocrine:  No polyuria, polydypsia, cold/heat intolerance  Heme:  No petechiae, ecchymosis, easy bruisability  Skin:  shingles      Vital Signs Last 24 Hrs  T(C): 37 (30 May 2019 08:04), Max: 37 (30 May 2019 08:04)  T(F): 98.6 (30 May 2019 08:04), Max: 98.6 (30 May 2019 08:04)  HR: 87 (30 May 2019 08:04) (80 - 87)  BP: 140/91 (30 May 2019 08:04) (115/72 - 140/91)  BP(mean): --  RR: 18 (30 May 2019 08:04) (18 - 18)  SpO2: 97% (30 May 2019 08:04) (97% - 98%)    PHYSICAL EXAM:    General:  nad  HEENT:  NC/AT  Chest:  dec bs  Cardiovascular:  rrr S1, S2  Abdomen:  Soft, non-tender, non-distended  Extremities:  no edema  Skin:  shingles l flank  Neuro/Psych:  A&Ox3        LABS:                        10.2   6.83  )-----------( 341      ( 30 May 2019 07:07 )             30.8     0530    139  |  100  |  3<L>  ----------------------------<  87  3.2<L>   |  27  |  0.54    Ca    7.2<L>      30 May 2019 07:07  Mg     1.8         TPro  5.2<L>  /  Alb  1.5<L>  /  TBili  0.4  /  DBili  x   /  AST  9<L>  /  ALT  8<L>  /  AlkPhos  65  0530      Urinalysis Basic - ( 29 May 2019 13:19 )    Color: Yellow / Appearance: Slightly Turbid / S.015 / pH: x  Gluc: x / Ketone: Moderate  / Bili: Small / Urobili: Negative   Blood: x / Protein: 25 mg/dL / Nitrite: Negative   Leuk Esterase: Trace / RBC: 0-2 /HPF / WBC 0-2   Sq Epi: x / Non Sq Epi: Occasional / Bacteria: x        RADIOLOGY & ADDITIONAL TESTS: 81 y/o female presenting to ED for vascular surgeon c/o  possible LLE DVT. Pt has had swelling x 1 week, and an ultrasound done outpatient showed pt has a DVT, pt sent her for anticoagulation evaluation. At this time pt. has no complaints, and denies chest pain, sob, ha, n/v/d, abdominal pain, f/c, urinary symptoms, hematuria, weakness, dizziness, numbness, tingling. A&Ox4 gross neuro intact, lungs cta bilaterally, no difficulty speaking in complete sentences, s1s2 heart sounds heard, pulses x 4, + 2 pedal edema in LLE, kang x4, abdomen soft nontender nondistended, skin intact. Safety and comfort measures maintained. Patient undressed and placed into gown, call bell in hand and side rails up for safety. warm blanket provided, vital signs stable, pt in no acute distress.

## 2019-06-10 ENCOUNTER — APPOINTMENT (OUTPATIENT)
Dept: INTERNAL MEDICINE | Facility: CLINIC | Age: 84
End: 2019-06-10
Payer: MEDICARE

## 2019-07-01 ENCOUNTER — NON-APPOINTMENT (OUTPATIENT)
Age: 84
End: 2019-07-01

## 2019-07-01 ENCOUNTER — APPOINTMENT (OUTPATIENT)
Dept: INTERNAL MEDICINE | Facility: CLINIC | Age: 84
End: 2019-07-01
Payer: MEDICARE

## 2019-07-01 VITALS — HEART RATE: 68 BPM | RESPIRATION RATE: 17 BRPM | OXYGEN SATURATION: 98 %

## 2019-07-01 LAB
INR PPP: 3 RATIO
POCT-PROTHROMBIN TIME: 36.3 SECS
QUALITY CONTROL: YES

## 2019-07-01 PROCEDURE — 93793 ANTICOAG MGMT PT WARFARIN: CPT

## 2019-07-01 PROCEDURE — 85610 PROTHROMBIN TIME: CPT | Mod: QW

## 2019-07-08 ENCOUNTER — APPOINTMENT (OUTPATIENT)
Dept: INTERNAL MEDICINE | Facility: CLINIC | Age: 84
End: 2019-07-08
Payer: MEDICARE

## 2019-07-08 ENCOUNTER — NON-APPOINTMENT (OUTPATIENT)
Age: 84
End: 2019-07-08

## 2019-07-08 VITALS — OXYGEN SATURATION: 97 % | RESPIRATION RATE: 15 BRPM | HEART RATE: 66 BPM

## 2019-07-08 LAB
INR PPP: 1.9 RATIO
POCT-PROTHROMBIN TIME: 23 SECS
QUALITY CONTROL: YES

## 2019-07-08 PROCEDURE — 85610 PROTHROMBIN TIME: CPT | Mod: QW

## 2019-07-08 PROCEDURE — 93793 ANTICOAG MGMT PT WARFARIN: CPT

## 2019-07-15 ENCOUNTER — APPOINTMENT (OUTPATIENT)
Dept: INTERNAL MEDICINE | Facility: CLINIC | Age: 84
End: 2019-07-15
Payer: MEDICARE

## 2019-07-15 ENCOUNTER — NON-APPOINTMENT (OUTPATIENT)
Age: 84
End: 2019-07-15

## 2019-07-15 LAB
INR PPP: 2.2 RATIO
POCT-PROTHROMBIN TIME: 26 SECS
QUALITY CONTROL: YES

## 2019-07-15 PROCEDURE — 85610 PROTHROMBIN TIME: CPT | Mod: QW

## 2019-07-15 PROCEDURE — 93793 ANTICOAG MGMT PT WARFARIN: CPT

## 2019-07-29 ENCOUNTER — APPOINTMENT (OUTPATIENT)
Dept: INTERNAL MEDICINE | Facility: CLINIC | Age: 84
End: 2019-07-29
Payer: MEDICARE

## 2019-07-29 VITALS — HEART RATE: 74 BPM | RESPIRATION RATE: 16 BRPM | OXYGEN SATURATION: 94 %

## 2019-07-29 LAB
INR PPP: 2.5 RATIO
POCT-PROTHROMBIN TIME: 30.1 SECS
QUALITY CONTROL: YES

## 2019-07-29 PROCEDURE — 85610 PROTHROMBIN TIME: CPT | Mod: QW

## 2019-07-29 PROCEDURE — 93793 ANTICOAG MGMT PT WARFARIN: CPT

## 2019-08-12 ENCOUNTER — APPOINTMENT (OUTPATIENT)
Dept: INTERNAL MEDICINE | Facility: CLINIC | Age: 84
End: 2019-08-12
Payer: MEDICARE

## 2019-08-12 VITALS — HEART RATE: 71 BPM | RESPIRATION RATE: 17 BRPM | OXYGEN SATURATION: 97 %

## 2019-08-12 LAB
INR PPP: 3 RATIO
POCT-PROTHROMBIN TIME: 37 SECS
QUALITY CONTROL: YES

## 2019-08-12 PROCEDURE — 85610 PROTHROMBIN TIME: CPT | Mod: QW

## 2019-08-12 PROCEDURE — 93793 ANTICOAG MGMT PT WARFARIN: CPT

## 2019-08-26 ENCOUNTER — RESULT REVIEW (OUTPATIENT)
Age: 84
End: 2019-08-26

## 2019-08-26 ENCOUNTER — LABORATORY RESULT (OUTPATIENT)
Age: 84
End: 2019-08-26

## 2019-09-09 ENCOUNTER — APPOINTMENT (OUTPATIENT)
Dept: INTERNAL MEDICINE | Facility: CLINIC | Age: 84
End: 2019-09-09
Payer: MEDICARE

## 2019-09-09 VITALS — OXYGEN SATURATION: 96 % | RESPIRATION RATE: 15 BRPM | HEART RATE: 70 BPM

## 2019-09-09 LAB
INR PPP: 2.6 RATIO
POCT-PROTHROMBIN TIME: 30.7 SECS
QUALITY CONTROL: YES

## 2019-09-09 PROCEDURE — 85610 PROTHROMBIN TIME: CPT | Mod: QW

## 2019-09-09 PROCEDURE — 93793 ANTICOAG MGMT PT WARFARIN: CPT

## 2019-09-16 ENCOUNTER — APPOINTMENT (OUTPATIENT)
Dept: INTERNAL MEDICINE | Facility: CLINIC | Age: 84
End: 2019-09-16
Payer: MEDICARE

## 2019-09-16 VITALS — HEART RATE: 71 BPM | RESPIRATION RATE: 17 BRPM | OXYGEN SATURATION: 98 %

## 2019-09-16 LAB
INR PPP: 2.3 RATIO
POCT-PROTHROMBIN TIME: 27.3 SECS
QUALITY CONTROL: YES

## 2019-09-16 PROCEDURE — 85610 PROTHROMBIN TIME: CPT | Mod: QW

## 2019-09-16 PROCEDURE — 93793 ANTICOAG MGMT PT WARFARIN: CPT

## 2019-10-07 ENCOUNTER — APPOINTMENT (OUTPATIENT)
Dept: INTERNAL MEDICINE | Facility: CLINIC | Age: 84
End: 2019-10-07
Payer: MEDICARE

## 2019-10-07 VITALS — HEART RATE: 68 BPM | RESPIRATION RATE: 16 BRPM | OXYGEN SATURATION: 99 %

## 2019-10-07 LAB
INR PPP: 2.5 RATIO
POCT-PROTHROMBIN TIME: 29.5 SECS
QUALITY CONTROL: YES

## 2019-10-07 PROCEDURE — 85610 PROTHROMBIN TIME: CPT | Mod: QW

## 2019-10-07 PROCEDURE — 93793 ANTICOAG MGMT PT WARFARIN: CPT

## 2019-11-04 ENCOUNTER — APPOINTMENT (OUTPATIENT)
Dept: INTERNAL MEDICINE | Facility: CLINIC | Age: 84
End: 2019-11-04
Payer: MEDICARE

## 2019-11-04 VITALS — OXYGEN SATURATION: 99 % | HEART RATE: 65 BPM | RESPIRATION RATE: 17 BRPM

## 2019-11-04 LAB
INR PPP: 3 RATIO
POCT-PROTHROMBIN TIME: 36 SECS
QUALITY CONTROL: YES

## 2019-11-04 PROCEDURE — 85610 PROTHROMBIN TIME: CPT | Mod: QW

## 2019-11-04 PROCEDURE — 93793 ANTICOAG MGMT PT WARFARIN: CPT

## 2019-12-02 ENCOUNTER — RESULT REVIEW (OUTPATIENT)
Age: 84
End: 2019-12-02

## 2019-12-02 ENCOUNTER — LABORATORY RESULT (OUTPATIENT)
Age: 84
End: 2019-12-02

## 2019-12-04 ENCOUNTER — NON-APPOINTMENT (OUTPATIENT)
Age: 84
End: 2019-12-04

## 2019-12-04 ENCOUNTER — APPOINTMENT (OUTPATIENT)
Dept: GERIATRICS | Facility: CLINIC | Age: 84
End: 2019-12-04
Payer: MEDICARE

## 2019-12-04 VITALS
RESPIRATION RATE: 15 BRPM | WEIGHT: 160.38 LBS | HEIGHT: 64 IN | HEART RATE: 88 BPM | BODY MASS INDEX: 27.38 KG/M2 | OXYGEN SATURATION: 98 % | TEMPERATURE: 98.7 F | DIASTOLIC BLOOD PRESSURE: 70 MMHG | SYSTOLIC BLOOD PRESSURE: 120 MMHG

## 2019-12-04 DIAGNOSIS — Z51.81 ENCOUNTER FOR THERAPEUTIC DRUG LVL MONITORING: ICD-10-CM

## 2019-12-04 DIAGNOSIS — Z79.01 ENCOUNTER FOR THERAPEUTIC DRUG LVL MONITORING: ICD-10-CM

## 2019-12-04 DIAGNOSIS — R26.81 UNSTEADINESS ON FEET: ICD-10-CM

## 2019-12-04 DIAGNOSIS — I10 ESSENTIAL (PRIMARY) HYPERTENSION: ICD-10-CM

## 2019-12-04 DIAGNOSIS — I48.0 PAROXYSMAL ATRIAL FIBRILLATION: ICD-10-CM

## 2019-12-04 PROCEDURE — 99215 OFFICE O/P EST HI 40 MIN: CPT

## 2019-12-04 NOTE — END OF VISIT
[FreeTextEntry3] : 82 yo woman,  who had a fall 2 weeks ago at home- no syncope-  (previous fall in June 2019) , brought by daughter Olimpia ( cell [phone]). Her daughter Rosa Maria is the "medical person". ( [phone]). She has been maintained on Coumadin because of hx of DVT in thigh- no PE and paroxysmal atrial fibrillation in 2014, after lobectomy. Coumadin being managed in anticoagulation clinic. \par PMH : lung CA, DVT (on coumadin), post-operative afib (2014, post-lobectomy) and AD who presents following several falls. Daughters have now purchase the NEST and "it's amazing". \par \par The patient lives alone in two family house. Olimpia lives on 1st floor.  She uses a walker or a cane. remains independent in her ADLs, but requires assistance with most of her iADLs.  Patient has a  , Annemarie, who comes for 3 hours a week.\par PE unremarkable- left era cerumen . heart is regular.\par Plan: 1) Suggest that make appointment with cardiologist to determine whether it is ESSENTIAL that patient be maintained on Coumadin, since she is a definite Falls risk\par 2) ENT consult to determine if there is ear pathology contributing to potential falls risk.

## 2019-12-04 NOTE — PHYSICAL EXAM
[General Appearance - Alert] : alert [General Appearance - In No Acute Distress] : in no acute distress [Sclera] : the sclera and conjunctiva were normal [PERRL With Normal Accommodation] : pupils were equal in size, round, and reactive to light [Extraocular Movements] : extraocular movements were intact [Neck Appearance] : the appearance of the neck was normal [Neck Cervical Mass (___cm)] : no neck mass was observed [Jugular Venous Distention Increased] : there was no jugular-venous distention [Thyroid Diffuse Enlargement] : the thyroid was not enlarged [Thyroid Nodule] : there were no palpable thyroid nodules [Auscultation Breath Sounds / Voice Sounds] : lungs were clear to auscultation bilaterally [FreeTextEntry1] : EKG RSR [Breast Appearance] : normal in appearance [Bowel Sounds] : normal bowel sounds [Breast Palpation Mass] : no palpable masses [Abdomen Tenderness] : non-tender [Abdomen Soft] : soft [Abdomen Mass (___ Cm)] : no abdominal mass palpated [No CVA Tenderness] : no ~M costovertebral angle tenderness [No Spinal Tenderness] : no spinal tenderness [Abnormal Walk] : normal gait [Nail Clubbing] : no clubbing  or cyanosis of the fingernails [Musculoskeletal - Swelling] : no joint swelling seen [Motor Tone] : muscle strength and tone were normal [Skin Turgor] : normal skin turgor [Skin Color & Pigmentation] : normal skin color and pigmentation [] : no rash [Deep Tendon Reflexes (DTR)] : deep tendon reflexes were 2+ and symmetric [Sensation] : the sensory exam was normal to light touch and pinprick [No Focal Deficits] : no focal deficits

## 2019-12-04 NOTE — HISTORY OF PRESENT ILLNESS
[FreeTextEntry1] : 84 yo woman,  who had a fall 2 weeks ago at home- no syncope-  (previous fall in June 2019) , brought by daughter Olimpia ( cell [phone]). Her daughter Rosa Maria is the "medical person". ( [phone]).\par PMH : lung CA, DVT (on coumadin), post-operative afib (2014, post-lobectomy) and AD who presents following several falls. Daughters have now purchase the NEST and "it's amazing". \par \par The patient lives alone in two family house. Olimpia lives on 1st floor.  She uses a walker or a cane. remains independent in her ADLs, but requires assistance with most of her iADLs.  Patient has a  , Annemarie, who comes for 3 hours a week.\par \par The patient is a former smoker (stopped 15 years, 4 packs/day for many years), former EtOH user, but does not drugs.

## 2019-12-04 NOTE — SOCIAL HISTORY
[Two or more falls in past year] : Patient reported two or more falls in the past year [Walker] : walker [Smoke Detector] : smoke detector [Carbon Monoxide Detector] : carbon monoxide detector [Guns at Home] : no guns at home [Safety elements used in home] : safety elements used in home

## 2019-12-17 ENCOUNTER — APPOINTMENT (OUTPATIENT)
Dept: GERIATRICS | Facility: CLINIC | Age: 84
End: 2019-12-17
Payer: MEDICARE

## 2019-12-17 DIAGNOSIS — Z23 ENCOUNTER FOR IMMUNIZATION: ICD-10-CM

## 2019-12-17 PROCEDURE — 90662 IIV NO PRSV INCREASED AG IM: CPT

## 2019-12-17 PROCEDURE — G0008: CPT

## 2019-12-26 ENCOUNTER — OTHER (OUTPATIENT)
Age: 84
End: 2019-12-26

## 2019-12-30 ENCOUNTER — APPOINTMENT (OUTPATIENT)
Dept: INTERNAL MEDICINE | Facility: CLINIC | Age: 84
End: 2019-12-30
Payer: MEDICARE

## 2019-12-30 VITALS — RESPIRATION RATE: 16 BRPM | OXYGEN SATURATION: 99 % | HEART RATE: 78 BPM

## 2019-12-30 LAB
INR PPP: 1.9 RATIO
POCT-PROTHROMBIN TIME: 23.1 SECS
QUALITY CONTROL: YES

## 2019-12-30 PROCEDURE — 93793 ANTICOAG MGMT PT WARFARIN: CPT

## 2019-12-30 PROCEDURE — 85610 PROTHROMBIN TIME: CPT | Mod: QW

## 2020-01-03 ENCOUNTER — APPOINTMENT (OUTPATIENT)
Dept: CARDIOLOGY | Facility: CLINIC | Age: 85
End: 2020-01-03
Payer: MEDICARE

## 2020-01-03 VITALS
OXYGEN SATURATION: 95 % | DIASTOLIC BLOOD PRESSURE: 73 MMHG | SYSTOLIC BLOOD PRESSURE: 133 MMHG | HEART RATE: 67 BPM | HEIGHT: 64 IN

## 2020-01-03 DIAGNOSIS — Z86.79 PERSONAL HISTORY OF OTHER DISEASES OF THE CIRCULATORY SYSTEM: ICD-10-CM

## 2020-01-03 DIAGNOSIS — Z86.39 PERSONAL HISTORY OF OTHER ENDOCRINE, NUTRITIONAL AND METABOLIC DISEASE: ICD-10-CM

## 2020-01-03 DIAGNOSIS — R06.02 SHORTNESS OF BREATH: ICD-10-CM

## 2020-01-03 DIAGNOSIS — Z87.898 PERSONAL HISTORY OF OTHER SPECIFIED CONDITIONS: ICD-10-CM

## 2020-01-03 DIAGNOSIS — Z82.49 FAMILY HISTORY OF ISCHEMIC HEART DISEASE AND OTHER DISEASES OF THE CIRCULATORY SYSTEM: ICD-10-CM

## 2020-01-03 DIAGNOSIS — Z80.9 FAMILY HISTORY OF MALIGNANT NEOPLASM, UNSPECIFIED: ICD-10-CM

## 2020-01-03 DIAGNOSIS — Z87.891 PERSONAL HISTORY OF NICOTINE DEPENDENCE: ICD-10-CM

## 2020-01-03 DIAGNOSIS — I82.409 ACUTE EMBOLISM AND THROMBOSIS OF UNSPECIFIED DEEP VEINS OF UNSPECIFIED LOWER EXTREMITY: ICD-10-CM

## 2020-01-03 PROCEDURE — 99214 OFFICE O/P EST MOD 30 MIN: CPT

## 2020-01-03 RX ORDER — WARFARIN 2.5 MG/1
2.5 TABLET ORAL
Qty: 90 | Refills: 6 | Status: COMPLETED | COMMUNITY
Start: 2018-01-31 | End: 2020-01-03

## 2020-01-03 RX ORDER — FUROSEMIDE 40 MG/1
40 TABLET ORAL
Qty: 30 | Refills: 0 | Status: COMPLETED | COMMUNITY
Start: 2019-12-26 | End: 2020-01-03

## 2020-01-03 RX ORDER — FUROSEMIDE 20 MG/1
20 TABLET ORAL DAILY
Qty: 30 | Refills: 3 | Status: ACTIVE | COMMUNITY
Start: 2020-01-03 | End: 1900-01-01

## 2020-01-04 PROBLEM — R06.02 SHORTNESS OF BREATH ON EXERTION: Status: ACTIVE | Noted: 2020-01-04

## 2020-01-04 NOTE — PHYSICAL EXAM
[General Appearance - Well Developed] : well developed [Normal Appearance] : normal appearance [Well Groomed] : well groomed [General Appearance - Well Nourished] : well nourished [No Deformities] : no deformities [General Appearance - In No Acute Distress] : no acute distress [Normal Conjunctiva] : the conjunctiva exhibited no abnormalities [Eyelids - No Xanthelasma] : the eyelids demonstrated no xanthelasmas [Normal Oral Mucosa] : normal oral mucosa [No Oral Pallor] : no oral pallor [No Oral Cyanosis] : no oral cyanosis [Respiration, Rhythm And Depth] : normal respiratory rhythm and effort [Exaggerated Use Of Accessory Muscles For Inspiration] : no accessory muscle use [Auscultation Breath Sounds / Voice Sounds] : lungs were clear to auscultation bilaterally [Heart Rate And Rhythm] : heart rate and rhythm were normal [Heart Sounds] : normal S1 and S2 [Murmurs] : no murmurs present [Abdomen Soft] : soft [Abdomen Tenderness] : non-tender [] : no hepato-splenomegaly [Abdomen Mass (___ Cm)] : no abdominal mass palpated [Abnormal Walk] : normal gait [Gait - Sufficient For Exercise Testing] : the gait was sufficient for exercise testing [FreeTextEntry1] : mild erythema of the bilateral shin due to swelling

## 2020-01-04 NOTE — REVIEW OF SYSTEMS
[Shortness Of Breath] : shortness of breath [Lower Ext Edema] : lower extremity edema [Cough] : cough [Negative] : Heme/Lymph

## 2020-01-04 NOTE — HISTORY OF PRESENT ILLNESS
[FreeTextEntry1] : 83 y/o WF with PMH of DM2, Malignant melanoma, lung cancer s/p L lower lobectomy, R popiteal / femoral DVT s/p AC/ Coumadin; post op atrial fibrillation Post - op atrial fibrillation (on Coumadin 7.5 mg po qdaily Mond 5mg )\par Patient was on AC for prior event completed course of AC and then patient had a flight to [place] and developed acute swelling in March 2019 and repeat US duplex showed acute DVT of the L common femoral, femoral and popiteal veins and thus restarted on Coumadin. \par Patient has been having frequent falls where daughter states she has fallen twice in 2 weeks - lost her balance (last fall 12/22/2019); and this was concerning for the PMD and whether safe to stop AC. Discussions over the phone regarding switching to Eliquis but concerns were raised regarding availability of reversal agent and thus wanted to discuss further in the office; which were addressed regarding safety profile of the drug \par \par Patient has been having worsening bilateral lower extremity swelling, with associated SOB and productive cough with whitish sputum and so advised to take Lasix 40mg but daughter stated that patient was dizzy with this dose and so decreased to Lasix 20mg po q daily; vitals with Lasix 40mg /59 HR: 59 bpm lethargic and weight 156.8->151.4.\par Last echocardiogram 2015: EF 54% \par No associated chest pain, palpitations

## 2020-01-04 NOTE — REASON FOR VISIT
[Follow-Up - Clinic] : a clinic follow-up of [FreeTextEntry1] : DVT/PE (on coumadin) with frequent falls

## 2020-01-04 NOTE — ASSESSMENT
[FreeTextEntry1] : 83 y/o WF with PMH of DM2, Malignant melanoma, lung cancer s/p L lower lobectomy, R popiteal / femoral DVT s/p AC/ Coumadin; post op atrial fibrillation Post - op atrial fibrillation (on Coumadin 7.5 mg po qdaily Mond 5mg ) here for follow up for progressively worsening b/l lower extremity edema and discussion of continuing AC \par \par 1. DVT / PE, lifelong therapy \par - on Coumadin \par \par 2. Shortness of breath and bilateral lower extremity swelling likely secondary to diastolic HF/ systolic HF vs venous insufficiency \par -improved with Lasix \par \par Plan: \par - for #2 recommend CBC/ CMP / Lipid Panel and Pro-BNP and 2d echocardiogram \par - for #2 continue with Lasix 20mg po q daily\par - #1 discussed safety profile of Eliquis and reversal agent available; obtain US duplex of the bilateral lower extremities; patient took dose of Coumadin on Friday and thus to STOP Coumadin and start Eliquis on Sunday 2.5mg po q 12hrs (therapy for extended therapy in light of concern for recurrent falls)\par - discussed to comply compression stockings; discussed to wear first thing in the morning and wear throughout the day and remove at night and elevate legs \par - RTC in 1 month

## 2020-01-06 ENCOUNTER — FORM ENCOUNTER (OUTPATIENT)
Age: 85
End: 2020-01-06

## 2020-01-07 ENCOUNTER — OUTPATIENT (OUTPATIENT)
Dept: OUTPATIENT SERVICES | Facility: HOSPITAL | Age: 85
LOS: 1 days | End: 2020-01-07
Payer: MEDICARE

## 2020-01-07 ENCOUNTER — APPOINTMENT (OUTPATIENT)
Dept: CV DIAGNOSITCS | Facility: HOSPITAL | Age: 85
End: 2020-01-07

## 2020-01-07 DIAGNOSIS — Z96.60 PRESENCE OF UNSPECIFIED ORTHOPEDIC JOINT IMPLANT: Chronic | ICD-10-CM

## 2020-01-07 DIAGNOSIS — Z98.89 OTHER SPECIFIED POSTPROCEDURAL STATES: Chronic | ICD-10-CM

## 2020-01-07 DIAGNOSIS — R60.0 LOCALIZED EDEMA: ICD-10-CM

## 2020-01-07 PROCEDURE — C8929: CPT

## 2020-01-07 PROCEDURE — 93970 EXTREMITY STUDY: CPT

## 2020-01-07 PROCEDURE — 93970 EXTREMITY STUDY: CPT | Mod: 26

## 2020-01-07 PROCEDURE — 93306 TTE W/DOPPLER COMPLETE: CPT | Mod: 26

## 2020-01-08 ENCOUNTER — EMERGENCY (EMERGENCY)
Facility: HOSPITAL | Age: 85
LOS: 1 days | Discharge: HOME | End: 2020-01-08
Attending: EMERGENCY MEDICINE
Payer: MEDICARE

## 2020-01-08 VITALS
OXYGEN SATURATION: 99 % | RESPIRATION RATE: 16 BRPM | DIASTOLIC BLOOD PRESSURE: 82 MMHG | HEART RATE: 69 BPM | SYSTOLIC BLOOD PRESSURE: 138 MMHG | TEMPERATURE: 97 F | WEIGHT: 151.02 LBS

## 2020-01-08 DIAGNOSIS — Z98.89 OTHER SPECIFIED POSTPROCEDURAL STATES: Chronic | ICD-10-CM

## 2020-01-08 DIAGNOSIS — Z96.60 PRESENCE OF UNSPECIFIED ORTHOPEDIC JOINT IMPLANT: Chronic | ICD-10-CM

## 2020-01-08 PROCEDURE — 93010 ELECTROCARDIOGRAM REPORT: CPT

## 2020-01-08 PROCEDURE — 99285 EMERGENCY DEPT VISIT HI MDM: CPT | Mod: GC

## 2020-01-09 VITALS
OXYGEN SATURATION: 96 % | DIASTOLIC BLOOD PRESSURE: 84 MMHG | RESPIRATION RATE: 16 BRPM | TEMPERATURE: 98 F | HEART RATE: 70 BPM | SYSTOLIC BLOOD PRESSURE: 143 MMHG

## 2020-01-09 LAB
ALBUMIN SERPL ELPH-MCNC: 3.7 G/DL — SIGNIFICANT CHANGE UP (ref 3.3–5)
ALP SERPL-CCNC: 64 U/L — SIGNIFICANT CHANGE UP (ref 40–120)
ALT FLD-CCNC: 12 U/L — SIGNIFICANT CHANGE UP (ref 10–45)
ANION GAP SERPL CALC-SCNC: 13 MMOL/L — SIGNIFICANT CHANGE UP (ref 5–17)
APPEARANCE UR: CLEAR — SIGNIFICANT CHANGE UP
AST SERPL-CCNC: 20 U/L — SIGNIFICANT CHANGE UP (ref 10–40)
BACTERIA # UR AUTO: NEGATIVE — SIGNIFICANT CHANGE UP
BASOPHILS # BLD AUTO: 0.02 K/UL — SIGNIFICANT CHANGE UP (ref 0–0.2)
BASOPHILS NFR BLD AUTO: 0.2 % — SIGNIFICANT CHANGE UP (ref 0–2)
BILIRUB SERPL-MCNC: 0.9 MG/DL — SIGNIFICANT CHANGE UP (ref 0.2–1.2)
BILIRUB UR-MCNC: NEGATIVE — SIGNIFICANT CHANGE UP
BUN SERPL-MCNC: 29 MG/DL — HIGH (ref 7–23)
CALCIUM SERPL-MCNC: 9.5 MG/DL — SIGNIFICANT CHANGE UP (ref 8.4–10.5)
CHLORIDE SERPL-SCNC: 103 MMOL/L — SIGNIFICANT CHANGE UP (ref 96–108)
CO2 SERPL-SCNC: 23 MMOL/L — SIGNIFICANT CHANGE UP (ref 22–31)
COLOR SPEC: SIGNIFICANT CHANGE UP
CREAT SERPL-MCNC: 1.07 MG/DL — SIGNIFICANT CHANGE UP (ref 0.5–1.3)
DIFF PNL FLD: ABNORMAL
EOSINOPHIL # BLD AUTO: 0 K/UL — SIGNIFICANT CHANGE UP (ref 0–0.5)
EOSINOPHIL NFR BLD AUTO: 0 % — SIGNIFICANT CHANGE UP (ref 0–6)
EPI CELLS # UR: 2 /HPF — SIGNIFICANT CHANGE UP
GLUCOSE SERPL-MCNC: 164 MG/DL — HIGH (ref 70–99)
GLUCOSE UR QL: NEGATIVE — SIGNIFICANT CHANGE UP
HCT VFR BLD CALC: 37.9 % — SIGNIFICANT CHANGE UP (ref 34.5–45)
HGB BLD-MCNC: 12.6 G/DL — SIGNIFICANT CHANGE UP (ref 11.5–15.5)
HYALINE CASTS # UR AUTO: 1 /LPF — SIGNIFICANT CHANGE UP (ref 0–2)
IMM GRANULOCYTES NFR BLD AUTO: 0.4 % — SIGNIFICANT CHANGE UP (ref 0–1.5)
KETONES UR-MCNC: ABNORMAL
LEUKOCYTE ESTERASE UR-ACNC: NEGATIVE — SIGNIFICANT CHANGE UP
LYMPHOCYTES # BLD AUTO: 1.32 K/UL — SIGNIFICANT CHANGE UP (ref 1–3.3)
LYMPHOCYTES # BLD AUTO: 15.8 % — SIGNIFICANT CHANGE UP (ref 13–44)
MCHC RBC-ENTMCNC: 30.6 PG — SIGNIFICANT CHANGE UP (ref 27–34)
MCHC RBC-ENTMCNC: 33.2 GM/DL — SIGNIFICANT CHANGE UP (ref 32–36)
MCV RBC AUTO: 92 FL — SIGNIFICANT CHANGE UP (ref 80–100)
MONOCYTES # BLD AUTO: 0.87 K/UL — SIGNIFICANT CHANGE UP (ref 0–0.9)
MONOCYTES NFR BLD AUTO: 10.4 % — SIGNIFICANT CHANGE UP (ref 2–14)
NEUTROPHILS # BLD AUTO: 6.12 K/UL — SIGNIFICANT CHANGE UP (ref 1.8–7.4)
NEUTROPHILS NFR BLD AUTO: 73.2 % — SIGNIFICANT CHANGE UP (ref 43–77)
NITRITE UR-MCNC: NEGATIVE — SIGNIFICANT CHANGE UP
NRBC # BLD: 0 /100 WBCS — SIGNIFICANT CHANGE UP (ref 0–0)
PH UR: 6.5 — SIGNIFICANT CHANGE UP (ref 5–8)
PLATELET # BLD AUTO: 168 K/UL — SIGNIFICANT CHANGE UP (ref 150–400)
POTASSIUM SERPL-MCNC: 4.2 MMOL/L — SIGNIFICANT CHANGE UP (ref 3.5–5.3)
POTASSIUM SERPL-SCNC: 4.2 MMOL/L — SIGNIFICANT CHANGE UP (ref 3.5–5.3)
PROT SERPL-MCNC: 6.7 G/DL — SIGNIFICANT CHANGE UP (ref 6–8.3)
PROT UR-MCNC: ABNORMAL
RBC # BLD: 4.12 M/UL — SIGNIFICANT CHANGE UP (ref 3.8–5.2)
RBC # FLD: 13.8 % — SIGNIFICANT CHANGE UP (ref 10.3–14.5)
RBC CASTS # UR COMP ASSIST: 2 /HPF — SIGNIFICANT CHANGE UP (ref 0–4)
SODIUM SERPL-SCNC: 139 MMOL/L — SIGNIFICANT CHANGE UP (ref 135–145)
SP GR SPEC: 1.02 — SIGNIFICANT CHANGE UP (ref 1.01–1.02)
TROPONIN T, HIGH SENSITIVITY RESULT: 18 NG/L — SIGNIFICANT CHANGE UP (ref 0–51)
UROBILINOGEN FLD QL: NEGATIVE — SIGNIFICANT CHANGE UP
WBC # BLD: 8.36 K/UL — SIGNIFICANT CHANGE UP (ref 3.8–10.5)
WBC # FLD AUTO: 8.36 K/UL — SIGNIFICANT CHANGE UP (ref 3.8–10.5)
WBC UR QL: 9 /HPF — HIGH (ref 0–5)

## 2020-01-09 PROCEDURE — 93005 ELECTROCARDIOGRAM TRACING: CPT

## 2020-01-09 PROCEDURE — 80053 COMPREHEN METABOLIC PANEL: CPT

## 2020-01-09 PROCEDURE — 82962 GLUCOSE BLOOD TEST: CPT

## 2020-01-09 PROCEDURE — 71045 X-RAY EXAM CHEST 1 VIEW: CPT | Mod: 26

## 2020-01-09 PROCEDURE — 81001 URINALYSIS AUTO W/SCOPE: CPT

## 2020-01-09 PROCEDURE — 99283 EMERGENCY DEPT VISIT LOW MDM: CPT | Mod: 25

## 2020-01-09 PROCEDURE — 84484 ASSAY OF TROPONIN QUANT: CPT

## 2020-01-09 PROCEDURE — 85027 COMPLETE CBC AUTOMATED: CPT

## 2020-01-09 PROCEDURE — 87086 URINE CULTURE/COLONY COUNT: CPT

## 2020-01-09 PROCEDURE — 71045 X-RAY EXAM CHEST 1 VIEW: CPT

## 2020-01-09 RX ORDER — SODIUM CHLORIDE 9 MG/ML
1000 INJECTION INTRAMUSCULAR; INTRAVENOUS; SUBCUTANEOUS ONCE
Refills: 0 | Status: COMPLETED | OUTPATIENT
Start: 2020-01-09 | End: 2020-01-09

## 2020-01-09 RX ADMIN — SODIUM CHLORIDE 1000 MILLILITER(S): 9 INJECTION INTRAMUSCULAR; INTRAVENOUS; SUBCUTANEOUS at 04:14

## 2020-01-09 NOTE — ED ADULT NURSE NOTE - NSIMPLEMENTINTERV_GEN_ALL_ED
Implemented All Fall Risk Interventions:  [place] to call system. Call bell, personal items and telephone within reach. Instruct patient to call for assistance. Room bathroom lighting operational. Non-slip footwear when patient is off stretcher. Physically safe environment: no spills, clutter or unnecessary equipment. Stretcher in lowest position, wheels locked, appropriate side rails in place. Provide visual cue, wrist band, yellow gown, etc. Monitor gait and stability. Monitor for mental status changes and reorient to person, place, and time. Review medications for side effects contributing to fall risk. Reinforce activity limits and safety measures with patient and family.

## 2020-01-09 NOTE — ED PROVIDER NOTE - PROGRESS NOTE DETAILS
Discussed follow-up instruction with patient and daughter and strict return precautions. Patient agreeable.  Meena Bar D.O. (PGY-1)

## 2020-01-09 NOTE — ED PROVIDER NOTE - NSFOLLOWUPINSTRUCTIONS_ED_ALL_ED_FT
- Lab and imaging results, if performed, were discussed with you along with your discharge diagnosis    - Follow up with your doctor in 2-3 days    - Return to the ED for any new, worsening, or concerning symptoms to you    - Continue all prescribed medications    - Take ibuprofen/tylenol as directed as needed for pain    - Rest and keep yourself hydrated with fluids

## 2020-01-09 NOTE — ED PROVIDER NOTE - ATTENDING CONTRIBUTION TO CARE
Afebrile. Awake and Alert. Lungs CTA. Heart RRR. Abdomen soft NTND. CN II-XII grossly intact. Moves all extremities without lateralization. No mid-line CS TTP. Head atraumatic.    Recent decreased PO intake with weakness and near fall Afebrile. Awake and Alert. Lungs CTA. Heart RRR. Abdomen soft NTND. CN II-XII grossly intact. Moves all extremities without lateralization. No mid-line CS TTP. Head atraumatic.    Recent decreased PO intake with weakness causing her to fall twice (no head strike)  r/o arrythmia  r/o ACS (no CP or SOB0  r/o UTI  r/o metabolic/electrolyte disturbance as source of weakness  r/o dehydration

## 2020-01-09 NOTE — ED ADULT NURSE NOTE - OBJECTIVE STATEMENT
84yF who presents to the ED with complaints of BL lower extremity weakness and s/p fall. PMH of ____________. Pt states she started feeling weak in her legs today which resulted in her having two falls today with no head trauma or LOC in either. She landed on her butt after the first fall and her knees on the second fall. Pt states since the fall she was unable to stand or walk feeling like she "could not lift her legs". Pt denies any bruising from falls and no bruising visible. Pt denies any numbness or tingling Pt denies any dizziness or vision changes. Pt denies CP, SOB, headaches, N/V, C/D, abdominal pain, back pain, knee pain, vision changes, rashes, cough, fever, chills. Pt usually walks with walker at home. Pt AAOx4. IV placed. No complaints of pain or discomfort at this time. Family at bedside. Bed locked and in lowest position with appropriate side rails raised. Will continue to monitor. 84yF who presents to the ED with complaints of BL lower extremity weakness and s/p fall. Pt states she started feeling weak in her legs today which resulted in her having two falls today with no head trauma or LOC in either. She landed on her butt after the first fall and her knees on the second fall. Pt states since the fall she was unable to stand or walk feeling like she "could not lift her legs". Pt denies any bruising from falls and no bruising visible. Pt denies any numbness or tingling Pt denies any dizziness or vision changes. Pt denies CP, SOB, headaches, N/V, C/D, abdominal pain, back pain, knee pain, vision changes, rashes, cough, fever, chills. Pt usually walks with walker at home. Pt also states she went to her MD earlier this week and they found "fluid around her heart". Pt AAOx4. IV placed. No complaints of pain or discomfort at this time. Family at bedside. Bed locked and in lowest position with appropriate side rails raised. Will continue to monitor. 84yF who presents to the ED with complaints of BL lower extremity weakness and s/p fall. PMH of DM, afib, lung cancer and breast cancer (R side lymph node removal). Pt states she started feeling weak in her legs today which resulted in her having two falls today with no head trauma or LOC in either. She landed on her butt after the first fall and her knees on the second fall. Pt states since the fall she was unable to stand or walk feeling like she "could not lift her legs". Pt denies any bruising from falls and no bruising visible. Pt denies any numbness or tingling Pt denies any dizziness or vision changes. Pt denies CP, SOB, headaches, N/V, C/D, abdominal pain, back pain, knee pain, vision changes, rashes, cough, fever, chills. Pt usually walks with walker at home. Pt also states she went to her MD earlier this week and they found "fluid around her heart". Pt AAOx4. IV placed. No complaints of pain or discomfort at this time. Family at bedside. Bed locked and in lowest position with appropriate side rails raised. Will continue to monitor.

## 2020-01-09 NOTE — ED ADULT NURSE REASSESSMENT NOTE - NS ED NURSE REASSESS COMMENT FT1
Pt AAOx4. Pt taken to bathroom with Serasteady with 2RN assist. Pt able to stand up with no shaking on legs. Pt able to hold herself up on Serasteady. Pt states she feels stronger than when she first came in. Pt able to give urine sample. Pt denies any pain or discomfort at this time. Pt given an extra blanket. Pt placed back in bed with wheels locked, bed in lowest position and appropriate side rails raised. Pt has no other complaints at this time.

## 2020-01-09 NOTE — ED PROVIDER NOTE - OBJECTIVE STATEMENT
84y F w/ PMHx Afib on Eliquis, hx of breast cancer, DM, hx of lung cancer brought in by daughter for two syncopal episodes. Daughter states while her mother was being helped out of bed by her sister, patient's feet slipped and patient fell forward into daughter's arms. Denies LOC or head trauma. Daughter states patient could not stand back up on her own. Patient denies prodromal dizziness, lightheadedness prior to fall. Daughter states second fall happened while patient was alone but daughter was watching mother on "nest device", denies head trama or LOC, immediately called neighbor to go assist patient and called EMS. 84y F w/ PMHx Afib on Eliquis, hx of breast cancer, DM, hx of lung cancer brought in by daughter for two syncopal episodes. Daughter states while her mother was being helped out of bed by her sister, patient's feet slipped and patient fell forward into daughter's arms. Denies LOC or head trauma. Daughter states patient could not stand back up on her own. Patient denies prodromal dizziness, lightheadedness prior to fall. Daughter states second fall happened while patient was alone but daughter was watching mother on "nest device", denies head trama or LOC, immediately called neighbor to go assist patient and called EMS. EMS arrived with patient sitting on knees, stating she was too weak to get up. Denies recent fever, chills, cp, sob, cough, n/v/d, rash, focal weakness or numbness. 84y F w/ PMHx Afib on Eliquis, hx of breast cancer, DM, hx of lung cancer brought in by daughter for two near-syncopal episodes. Daughter states while her mother was being helped out of bed by her sister, patient's feet slipped and patient fell forward into daughter's arms. Denies LOC or head trauma. Daughter states patient could not stand back up on her own. Patient denies prodromal dizziness, lightheadedness prior to fall. Daughter states second fall happened while patient was alone but daughter was watching mother on "nest device", denies head trama or LOC, immediately called neighbor to go assist patient and called EMS. EMS arrived with patient sitting on knees, stating she was too weak to get up. Denies recent fever, chills, cp, sob, cough, n/v/d, rash, focal weakness or numbness.

## 2020-01-09 NOTE — ED PROVIDER NOTE - CLINICAL SUMMARY MEDICAL DECISION MAKING FREE TEXT BOX
84y F w/ PMHx Afib on Eliquis, hx of breast cancer, DM, hx of lung cancer brought in by daughter for two syncopal episodes. No head trauma, VSS, neurologically intact. Patient appears dry on PE. Will r/o infectious cause of syncope vs. dehydration vs anemia. labs, IVF, EKG, reassess. 84y F w/ PMHx Afib on Eliquis, hx of breast cancer, DM, hx of lung cancer brought in by daughter for two near-syncopal episodes. No head trauma, VSS, neurologically intact. Patient appears dry on PE. Will r/o infectious cause of near-syncope vs. dehydration vs anemia. labs, IVF, EKG, reassess.

## 2020-01-09 NOTE — ED PROVIDER NOTE - PATIENT PORTAL LINK FT
You can access the FollowMyHealth Patient Portal offered by [facility] by registering at the following website: [email or web address]. By joining [employer]’s FollowMyHealth portal, you will also be able to view your health information using other applications (apps) compatible with our system.

## 2020-01-09 NOTE — ED PROVIDER NOTE - PHYSICAL EXAMINATION
Physical Exam:  Gen: NAD, non-toxic appearing, awake alert   Head: NCAT  HEENT: EOMI, PEERL, normal conjunctiva, oral mucosa moist  Lung: CTAB, no respiratory distress, no wheezes/rhonchi/rales B/L, speaking in full sentences  CV: RRR, no murmurs, rubs or gallops  Abd: soft, NT, ND, no guarding, no rigidity, no rebound tenderness, no CVA tenderness   MSK: no visible deformities, ROM normal in UE/LE, no spinal tenderness   Neuro: CN2-12 grossly intact, A&Ox4, MS +5/5 in UE and LE BL, finger to nose smooth and rapid, gross sensation intact in UE and LE BL, negative pronator drift  Skin: Warm, well perfused, no rash, 1+pitting edema of B/L LE  Psych: normal affect, calm  ~Meena Bar D.O. -Resident

## 2020-01-10 LAB
CULTURE RESULTS: SIGNIFICANT CHANGE UP
SPECIMEN SOURCE: SIGNIFICANT CHANGE UP

## 2020-01-13 ENCOUNTER — APPOINTMENT (OUTPATIENT)
Dept: INTERNAL MEDICINE | Facility: CLINIC | Age: 85
End: 2020-01-13

## 2020-01-13 ENCOUNTER — MESSAGE (OUTPATIENT)
Age: 85
End: 2020-01-13

## 2020-01-17 LAB
ALBUMIN SERPL ELPH-MCNC: 4.4 G/DL
ALP BLD-CCNC: 70 U/L
ALT SERPL-CCNC: 9 U/L
ANION GAP SERPL CALC-SCNC: 12 MMOL/L
AST SERPL-CCNC: 13 U/L
BASOPHILS # BLD AUTO: 0.03 K/UL
BASOPHILS NFR BLD AUTO: 0.5 %
BILIRUB SERPL-MCNC: 0.5 MG/DL
BUN SERPL-MCNC: 21 MG/DL
CALCIUM SERPL-MCNC: 9.8 MG/DL
CHLORIDE SERPL-SCNC: 107 MMOL/L
CHOLEST SERPL-MCNC: 204 MG/DL
CHOLEST/HDLC SERPL: 3.9 RATIO
CO2 SERPL-SCNC: 28 MMOL/L
CREAT SERPL-MCNC: 1.1 MG/DL
EOSINOPHIL # BLD AUTO: 0 K/UL
EOSINOPHIL NFR BLD AUTO: 0 %
GLUCOSE SERPL-MCNC: 87 MG/DL
HCT VFR BLD CALC: 42.2 %
HDLC SERPL-MCNC: 52 MG/DL
HGB BLD-MCNC: 13.7 G/DL
IMM GRANULOCYTES NFR BLD AUTO: 0.3 %
LDLC SERPL CALC-MCNC: 111 MG/DL
LYMPHOCYTES # BLD AUTO: 1.79 K/UL
LYMPHOCYTES NFR BLD AUTO: 28.5 %
MAN DIFF?: NORMAL
MCHC RBC-ENTMCNC: 30.2 PG
MCHC RBC-ENTMCNC: 32.5 GM/DL
MCV RBC AUTO: 93.2 FL
MONOCYTES # BLD AUTO: 0.55 K/UL
MONOCYTES NFR BLD AUTO: 8.8 %
NEUTROPHILS # BLD AUTO: 3.88 K/UL
NEUTROPHILS NFR BLD AUTO: 61.9 %
NT-PROBNP SERPL-MCNC: 471 PG/ML
PLATELET # BLD AUTO: 208 K/UL
POTASSIUM SERPL-SCNC: 4.5 MMOL/L
PROT SERPL-MCNC: 7.1 G/DL
RBC # BLD: 4.53 M/UL
RBC # FLD: 13.7 %
SODIUM SERPL-SCNC: 147 MMOL/L
TRIGL SERPL-MCNC: 204 MG/DL
WBC # FLD AUTO: 6.27 K/UL

## 2020-02-25 ENCOUNTER — INPATIENT (INPATIENT)
Facility: HOSPITAL | Age: 85
LOS: 2 days | Discharge: NURSING, REHAB OR LONG-TERM CARE | DRG: 312 | End: 2020-02-28
Attending: HOSPITALIST | Admitting: HOSPITALIST
Payer: MEDICARE

## 2020-02-25 VITALS
SYSTOLIC BLOOD PRESSURE: 146 MMHG | OXYGEN SATURATION: 96 % | WEIGHT: 169.98 LBS | TEMPERATURE: 98 F | RESPIRATION RATE: 18 BRPM | HEART RATE: 74 BPM | DIASTOLIC BLOOD PRESSURE: 72 MMHG

## 2020-02-25 DIAGNOSIS — Z98.89 OTHER SPECIFIED POSTPROCEDURAL STATES: Chronic | ICD-10-CM

## 2020-02-25 DIAGNOSIS — Z96.60 PRESENCE OF UNSPECIFIED ORTHOPEDIC JOINT IMPLANT: Chronic | ICD-10-CM

## 2020-02-25 LAB
ALBUMIN SERPL ELPH-MCNC: 3.6 G/DL — SIGNIFICANT CHANGE UP (ref 3.3–5)
ALP SERPL-CCNC: 65 U/L — SIGNIFICANT CHANGE UP (ref 40–120)
ALT FLD-CCNC: 14 U/L — SIGNIFICANT CHANGE UP (ref 10–45)
ANION GAP SERPL CALC-SCNC: 9 MMOL/L — SIGNIFICANT CHANGE UP (ref 5–17)
AST SERPL-CCNC: 16 U/L — SIGNIFICANT CHANGE UP (ref 10–40)
BASOPHILS # BLD AUTO: 0.01 K/UL — SIGNIFICANT CHANGE UP (ref 0–0.2)
BASOPHILS NFR BLD AUTO: 0.1 % — SIGNIFICANT CHANGE UP (ref 0–2)
BILIRUB SERPL-MCNC: 0.4 MG/DL — SIGNIFICANT CHANGE UP (ref 0.2–1.2)
BUN SERPL-MCNC: 27 MG/DL — HIGH (ref 7–23)
CALCIUM SERPL-MCNC: 9.1 MG/DL — SIGNIFICANT CHANGE UP (ref 8.4–10.5)
CHLORIDE SERPL-SCNC: 107 MMOL/L — SIGNIFICANT CHANGE UP (ref 96–108)
CO2 SERPL-SCNC: 23 MMOL/L — SIGNIFICANT CHANGE UP (ref 22–31)
CREAT SERPL-MCNC: 0.89 MG/DL — SIGNIFICANT CHANGE UP (ref 0.5–1.3)
EOSINOPHIL # BLD AUTO: 0 K/UL — SIGNIFICANT CHANGE UP (ref 0–0.5)
EOSINOPHIL NFR BLD AUTO: 0 % — SIGNIFICANT CHANGE UP (ref 0–6)
GLUCOSE SERPL-MCNC: 149 MG/DL — HIGH (ref 70–99)
HCT VFR BLD CALC: 41.3 % — SIGNIFICANT CHANGE UP (ref 34.5–45)
HGB BLD-MCNC: 13.1 G/DL — SIGNIFICANT CHANGE UP (ref 11.5–15.5)
IMM GRANULOCYTES NFR BLD AUTO: 0.4 % — SIGNIFICANT CHANGE UP (ref 0–1.5)
INR BLD: 1.13 RATIO — SIGNIFICANT CHANGE UP (ref 0.88–1.16)
LYMPHOCYTES # BLD AUTO: 1.35 K/UL — SIGNIFICANT CHANGE UP (ref 1–3.3)
LYMPHOCYTES # BLD AUTO: 16.9 % — SIGNIFICANT CHANGE UP (ref 13–44)
MCHC RBC-ENTMCNC: 29.6 PG — SIGNIFICANT CHANGE UP (ref 27–34)
MCHC RBC-ENTMCNC: 31.7 GM/DL — LOW (ref 32–36)
MCV RBC AUTO: 93.2 FL — SIGNIFICANT CHANGE UP (ref 80–100)
MONOCYTES # BLD AUTO: 0.63 K/UL — SIGNIFICANT CHANGE UP (ref 0–0.9)
MONOCYTES NFR BLD AUTO: 7.9 % — SIGNIFICANT CHANGE UP (ref 2–14)
NEUTROPHILS # BLD AUTO: 5.96 K/UL — SIGNIFICANT CHANGE UP (ref 1.8–7.4)
NEUTROPHILS NFR BLD AUTO: 74.7 % — SIGNIFICANT CHANGE UP (ref 43–77)
NRBC # BLD: 0 /100 WBCS — SIGNIFICANT CHANGE UP (ref 0–0)
PLATELET # BLD AUTO: 173 K/UL — SIGNIFICANT CHANGE UP (ref 150–400)
POTASSIUM SERPL-MCNC: 3.9 MMOL/L — SIGNIFICANT CHANGE UP (ref 3.5–5.3)
POTASSIUM SERPL-SCNC: 3.9 MMOL/L — SIGNIFICANT CHANGE UP (ref 3.5–5.3)
PROT SERPL-MCNC: 6.3 G/DL — SIGNIFICANT CHANGE UP (ref 6–8.3)
PROTHROM AB SERPL-ACNC: 12.9 SEC — SIGNIFICANT CHANGE UP (ref 10–12.9)
RBC # BLD: 4.43 M/UL — SIGNIFICANT CHANGE UP (ref 3.8–5.2)
RBC # FLD: 13.7 % — SIGNIFICANT CHANGE UP (ref 10.3–14.5)
SODIUM SERPL-SCNC: 139 MMOL/L — SIGNIFICANT CHANGE UP (ref 135–145)
TROPONIN T, HIGH SENSITIVITY RESULT: 21 NG/L — SIGNIFICANT CHANGE UP (ref 0–51)
WBC # BLD: 7.98 K/UL — SIGNIFICANT CHANGE UP (ref 3.8–10.5)
WBC # FLD AUTO: 7.98 K/UL — SIGNIFICANT CHANGE UP (ref 3.8–10.5)

## 2020-02-25 PROCEDURE — 93010 ELECTROCARDIOGRAM REPORT: CPT

## 2020-02-25 PROCEDURE — 70486 CT MAXILLOFACIAL W/O DYE: CPT | Mod: 26

## 2020-02-25 PROCEDURE — 70450 CT HEAD/BRAIN W/O DYE: CPT | Mod: 26

## 2020-02-25 PROCEDURE — 76377 3D RENDER W/INTRP POSTPROCES: CPT | Mod: 26

## 2020-02-25 PROCEDURE — 99222 1ST HOSP IP/OBS MODERATE 55: CPT | Mod: GC

## 2020-02-25 PROCEDURE — 99285 EMERGENCY DEPT VISIT HI MDM: CPT | Mod: GC

## 2020-02-25 RX ORDER — APIXABAN 2.5 MG/1
2.5 TABLET, FILM COATED ORAL ONCE
Refills: 0 | Status: COMPLETED | OUTPATIENT
Start: 2020-02-25 | End: 2020-02-25

## 2020-02-25 RX ORDER — SODIUM CHLORIDE 9 MG/ML
500 INJECTION INTRAMUSCULAR; INTRAVENOUS; SUBCUTANEOUS ONCE
Refills: 0 | Status: COMPLETED | OUTPATIENT
Start: 2020-02-25 | End: 2020-02-25

## 2020-02-25 RX ADMIN — SODIUM CHLORIDE 250 MILLILITER(S): 9 INJECTION INTRAMUSCULAR; INTRAVENOUS; SUBCUTANEOUS at 17:27

## 2020-02-25 NOTE — CONSULT NOTE ADULT - ASSESSMENT
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 Cardiology consulted for hsTroponin with positive delta troponin.    #Elevated troponin  - No active chest pain, hsTroponin 16->21, EKG without acute ischemia, not suggestive of myocardial ischemia  - Recent TTE with normal systolic function, no valvular disease, patient without evidence of heart failure on exam  - No indication for additional ischemic workup at this time    #Paroxysmal a fib  - In sinus rhythm on examination  - Continue home Eliquis 2.5mg BID    #Syncope  #History of DVT/PE on lifelong AC  - Patient with recurrent presyncope/syncope for past month, unclear etiology  - Monitor on telemetry for possible arrythmia  - Repeat bilateral LE dopplers, can consider CT PE to rule out PE if no other etiology identified for syncope    Patient to be staffed with attending. Please await attending addendum.    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    Now presents for evaluation of recurrent syncope.   Cardiology consulted for hsTroponin with positive delta troponin.      #1.  Elevated troponin  - No active chest pain, hsTroponin 16->21, EKG without acute ischemia, not suggestive of myocardial ischemia  - Recent TTE with normal systolic function, no valvular disease, patient without evidence of heart failure on exam  - No indication for additional ischemic workup at this time    #2.  Paroxysmal a fib  - In sinus rhythm on examination  - Continue home Eliquis 2.5mg BID    #3. Syncope; history of DVT/PE on lifelong AC  - Patient with recurrent presyncope/syncope for past month, unclear etiology  - Monitor on telemetry for possible arrythmia  - Repeat bilateral LE dopplers, can consider CT PE to rule out PE if no other etiology identified for syncope      Mayda Laws MD  Cardiology Fellow  [phone]    Lorenzo Vasquez M.D.  Cardiology Attending, Consult Service  Beeper     All Cardiology service information can be found 24/7 on [email or web address], password: [email or web address].

## 2020-02-25 NOTE — ED PROVIDER NOTE - INTERPRETATION
EKG reviewed for rate, rhythm, axis, intervals and segments, including QRS morphology, P wave appearance T wave appearance, OK interval, and QT interval.  I find the EKG to be unremarkable in all of these regards except as follows: borderline sinus clint

## 2020-02-25 NOTE — ED PROVIDER NOTE - CLINICAL SUMMARY MEDICAL DECISION MAKING FREE TEXT BOX
José Miguel PGY-1: elderly female with multiple syncopes in setting of decreased PO intake. Dehydration vs cardiac syncope although less likely this. A/0x3, mild dementia, lives at home alone with frequent falls. Family requesting admission.

## 2020-02-25 NOTE — ED PROVIDER NOTE - OBJECTIVE STATEMENT
84 year old female pmh afib on eliquis, breast ca s/p lobectomy, presents BIBEMS for syncopal episode. Patient states she has been in bed for the past two days due to lightheadedness. Patietnt states she in not eating and drinking due to "forgetting" 84 year old female pmh afib on Eliquis, breast ca s/p lobectomy, presents BIBEMS for syncopal episode. Patient states she has been in bed for the past two days due to lightheadedness. Patient states she is not eating and drinking due to "forgetting". Patient states she is lightheaded both lying and bed and when she stands up from seated position. Patient states she left her bed today to walk to bathroom with walker. PT does not remember loosing consciousness but recalls waking up on the floor. Patient states she was able to stand up after fall but passed out again. Patient called daughter who called EMS. Patient denies prodrome before syncope. No cp, sob, diaphoresis, palpitations. + headstrike. No head neck or back pain.

## 2020-02-25 NOTE — ED PROVIDER NOTE - PHYSICAL EXAMINATION
Physical Exam:    I have reviewed the triage vital signs.    Gen: NAD, AOx3, non-toxic appearing, unable to ambulate without assistance.   Head and Neck: NCAT, Neck supple without meningismus. New abrasion to left scalp.   HEENT: EOMI, PEERLA, normal conjunctiva, tongue midline, oral mucosa moist  Lung: CTAB, no respiratory distress, no wheezes/rhonchi/rales B/L, speaking in full sentences  CV: RRR, no murmurs, rubs or gallops  Abd: soft, NT, ND, no guarding, no rigidity, no rebound tenderness, no CVA tenderness, no masses.   MSK: no gross deformities, ROM normal in UE/LE, no back tenderness  Neuro: CNs II-XII grossly intact. No focal sensory or motor deficits  Skin: Warm, well perfused, no rash, no leg swelling  Psych: Appropriate mood and affect

## 2020-02-25 NOTE — CONSULT NOTE ADULT - NSHPATTENDINGPLANDISCUSS_GEN_ALL_CORE
cardiology fellow; patient seen and examined.       I was physically present for the key portions of the evaluation and management (E/M) service provided.    I agree with the above history, physical, and plan which I have reviewed and edited where appropriate.

## 2020-02-25 NOTE — CONSULT NOTE ADULT - SUBJECTIVE AND OBJECTIVE BOX
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 Cardiology consulted for hsTroponin with positive delta troponin.  Per history, patient was alone at home today, daughter saw on camera that she was found down on the ground. Had two episodes, patient herself unable to remember if she lost consciousness, however found herself leaning against closet door when she came to, may have been out for "seconds to five minutes." Patient was recently evaluated at ED last month for near syncopal episodes. Per patient and daughter, has had recurrent falls for past month without prodrome, denies weakness of legs, dizziness, chest pain or palpitations beforehand. No current chest pain.  Patient follows with Dr. Shanks as outpatient for history of DVT/PE, started on Eliquis last visit 1/2020 from Coumadin for extended therapy with concern for recurrent falls. US dopplers at time with residual thrombus of L common femoral and popliteal veins, TTE obtained at time without gross abnormalities.    PMHx:   Breast cancer  Lung cancer, left  Diabetes mellitus  Afib    PSHx:   S/P colectomy  Status post left hip replacement  S/P lobectomy of lung    Allergies:  codeine (Fever; Rash)  contrast media (iodine-based) (Other)    Home Meds:  Anoro Ellipta 62.5-25 MCG/INH Inhalation Aerosol Powder Breath Activated  Furosemide 40 MG Oral Tablet; Take 1 tablet daily  Vitamin D3 50 MCG (2000 UT) Oral Capsule; TAKE 1 CAPSULE Daily  Eliquis 2.5mg BID    Current Medications:       FAMILY HISTORY:  Family history of lung cancer (Sibling)  Family history of secondary cancer of bone and bone marrow  Family history of dementia    Social History:  Smoking History: Denies    REVIEW OF SYSTEMS:  CONSTITUTIONAL: No weakness, fevers or chills  EYES/ENT: No visual changes;  No dysphagia  NECK: No pain or stiffness  RESPIRATORY: No cough, wheezing, hemoptysis; No shortness of breath  CARDIOVASCULAR: No chest pain or palpitations; No lower extremity edema  GASTROINTESTINAL: No abdominal or epigastric pain. No nausea, vomiting, or hematemesis; No diarrhea or constipation. No melena or hematochezia.  BACK: No back pain  GENITOURINARY: No dysuria, frequency or hematuria  NEUROLOGICAL: No numbness or weakness; +syncope  SKIN: No itching, burning, rashes, or lesions   All other review of systems is negative unless indicated above.    Physical Exam:  T(F): 97.8 (02-25), Max: 97.8 (02-25)  HR: 74 (02-25) (74 - 74)  BP: 146/72 (02-25) (146/72 - 146/72)  RR: 18 (02-25)  SpO2: 96% (02-25)  GENERAL: No acute distress, well-developed  HEAD:  Atraumatic, Normocephalic  ENT: EOMI, conjunctiva and sclera clear, Neck supple, No JVD, moist mucosa  CHEST/LUNG: Clear to auscultation bilaterally; No wheeze  HEART: Regular rate and rhythm; equal S1 and S2, no murmurs  ABDOMEN: Soft  EXTREMITIES:  No clubbing, cyanosis, 1+ pitting edema of BL LE L > R, chronic per patient  PSYCH: Nl behavior, nl affect  NEUROLOGY: AAOx3, non-focal, cranial nerves grossly intact  SKIN: Normal color    Cardiovascular Diagnostic Testing:  ECG:  sinus rhythm, RBBB morphology with TWI lead V1-V2    Echo:  < from: TTE with Doppler (w/Cont) (01.07.20 @ 12:47) >  Conclusions:  1. Normal left ventricular internal dimensions and wall  thicknesses.  2. Normal left ventricular systolic function.   Endocardial  visualization enhanced with intravenous injection of  Ultrasonic Enhancing Agent (Definity).  3. Mild diastolic dysfunction (Stage I).  4. Estimated pulmonary artery systolic pressure equals 37  mm Hg, assuming right atrial pressure equals 8 mm Hg,  consistent with borderline pulmonary pressures.    < end of copied text >    Imaging:  < from: [facility] Duplex Lower Ext Vein Scan, Bilat (01.07.20 @ 12:41) >  IMPRESSION:     There is residual mural thrombus and/or wall thickening affecting the left common femoral, femoral and popliteal veins.    There is residual thrombus in the left posterior tibial peroneal trunk.    Acute DVT is not identified.    < end of copied text >    Labs: Personally reviewed                        13.1   7.98  )-----------( 173      ( 25 Feb 2020 17:37 )             41.3     02-25    139  |  107  |  27<H>  ----------------------------<  149<H>  3.9   |  23  |  0.89    Ca    9.1      25 Feb 2020 17:37    TPro  6.3  /  Alb  3.6  /  TBili  0.4  /  DBili  x   /  AST  16  /  ALT  14  /  AlkPhos  65  02-25    PT/INR - ( 25 Feb 2020 17:37 )   PT: 12.9 sec;   INR: 1.13 ratio      CARDIAC MARKERS ( 25 Feb 2020 22:32 )  21 ng/L / x     / x     / x     / x     / x      CARDIAC MARKERS ( 25 Feb 2020 17:37 )  16 ng/L / x     / x     / x     / x     / x Chief Complaint: Syncope    85 yo F with hx. paroxysmal a fib, history of DVT/PE on lifelong A/C therapy, DM2, lung cancer s/p L lower lobectomy.  Now presents for evaluation of recurrent syncope. Cardiology consulted for hsTroponin with positive delta troponin.    Per history, patient was alone at home today, daughter saw on camera that she was found down on the ground.  Had two episodes, patient herself unable to remember if she lost consciousness, however, found herself leaning against closet door when she came to, may have been out for "seconds to five minutes."  Patient was evaluated in ED last month for near syncopal episode. Per patient and daughter, has had recurrent falls for past month without prodrome, denies weakness of legs, dizziness, chest pain or palpitations beforehand. No current chest pain.    Patient follows with Dr. Shanks as outpatient for history of DVT/PE, changed to Eliquis last visit 1/2020 from Coumadin for extended therapy, due to concern regarding recurrent falls.  US/Dopplers at time with residual thrombus of L common femoral and popliteal veins, TTE obtained at time without gross abnormalities.      PMHx:   Breast cancer  Lung cancer, left  Diabetes mellitus  Afib      PSHx:   S/P colectomy  Status post left hip replacement  S/P lobectomy of lung      Allergies:  codeine (Fever; Rash)  contrast media (iodine-based) (Other)      Home Meds:  Anoro Ellipta 62.5-25 MCG/INH Inhalation Aerosol Powder Breath Activated  Furosemide 40 MG Oral Tablet; Take 1 tablet daily  Vitamin D3 50 MCG (2000 UT) Oral Capsule; TAKE 1 CAPSULE Daily  Eliquis 2.5mg BID      FAMILY HISTORY:  Family history of lung cancer (Sibling)  Family history of secondary cancer of bone and bone marrow  Family history of dementia    Social History:  Smoking History: Denies    REVIEW OF SYSTEMS:  CONSTITUTIONAL: No weakness, fevers or chills  EYES/ENT: No visual changes;  No dysphagia  NECK: No pain or stiffness  RESPIRATORY: No cough, wheezing, hemoptysis; No shortness of breath  CARDIOVASCULAR: No chest pain or palpitations; No lower extremity edema  GASTROINTESTINAL: No abdominal or epigastric pain. No nausea, vomiting, or hematemesis; No diarrhea or constipation. No melena or hematochezia.  BACK: No back pain  GENITOURINARY: No dysuria, frequency or hematuria  NEUROLOGICAL: No numbness or weakness; +syncope  SKIN: No itching, burning, rashes, or lesions   All other review of systems is negative unless indicated above.    Physical Exam:  T(F): 97.8 (02-25), Max: 97.8 (02-25)  HR: 74 (02-25) (74 - 74)  BP: 146/72 (02-25) (146/72 - 146/72)  RR: 18 (02-25)  SpO2: 96% (02-25)    GENERAL: No acute distress, well-developed  HEAD:  Atraumatic, Normocephalic  ENT: EOMI, conjunctiva and sclera clear, Neck supple, No JVD, moist mucosa  CHEST/LUNG: Clear to auscultation bilaterally; No wheeze  HEART: Regular rate and rhythm; equal S1 and S2, no murmurs  ABDOMEN: Soft  EXTREMITIES:  No clubbing, cyanosis, 1+ pitting edema of BL LE L > R, chronic per patient  PSYCH: Nl behavior, nl affect  NEUROLOGY: AAOx3, non-focal, cranial nerves grossly intact  SKIN: Normal color      ECG:  NSR; RBBB morphology with TWI lead V1-V2      TTE with Doppler (w/Cont) (01.07.20 @ 12:47) >  Conclusions:  1. Normal left ventricular internal dimensions and wall thicknesses.  2. Normal left ventricular systolic function.   Endocardial visualization enhanced with intravenous injection of Ultrasonic Enhancing Agent (Definity).   3. Mild diastolic dysfunction (Stage I).  4. Estimated pulmonary artery systolic pressure equals 37 mm Hg, assuming right atrial pressure equals 8 mm Hg, consistent with borderline pulmonary pressures.      VA Duplex Lower Ext Vein Scan, Bilat (01.07.20 @ 12:41) >  IMPRESSION:   There is residual mural thrombus and/or wall thickening affecting the left common femoral, femoral and popliteal veins.  There is residual thrombus in the left posterior tibial peroneal trunk.  Acute DVT is not identified.        Labs:                       13.1   7.98  )-----------( 173      ( 25 Feb 2020 17:37 )             41.3     02-25  139  |  107  |  27<H>  ----------------------------<  149<H>  3.9   |  23  |  0.89    Ca    9.1      25 Feb 2020 17:37    TPro  6.3  /  Alb  3.6  /  TBili  0.4  /  DBili  x   /  AST  16  /  ALT  14  /  AlkPhos  65  02-25    PT/INR - ( 25 Feb 2020 17:37 )   PT: 12.9 sec;   INR: 1.13 ratio      CARDIAC MARKERS ( 25 Feb 2020 22:32 )  21 ng/L / x     / x     / x     / x     / x      CARDIAC MARKERS ( 25 Feb 2020 17:37 )  16 ng/L / x     / x     / x     / x     / x

## 2020-02-25 NOTE — ED ADULT NURSE NOTE - OBJECTIVE STATEMENT
83 yo female presents to ED via EMS from home s/p unwitnessed fall. Patient states she was ambulating and then was found down . Patient does not recall events leading up to fall. Per family, patient has had frequent falls recently, both mechanical and syncopal. 83 yo female presents to ED via EMS from home s/p unwitnessed fall. Patient states she was ambulating and then was found down . Patient does not recall events leading up to fall. Per family, patient has had frequent falls recently, both mechanical and syncopal. Patient arrives with small laceration to back of head, bleeding stopped upon arrival. Patient has no complaints at this time. Patient A&Ox2 (self/place), breathing spontaneously, airway patent, bl clear lungs, abdomen nontender, +pulses, cap refill <2 seconds. Patient resting in bed, side rails up, plan of care explained. Cardiac monitor in place.

## 2020-02-25 NOTE — ED PROVIDER NOTE - NS ED ROS FT
Gen: No fever, decreased appetite  Eyes: No eye irritation or discharge  ENT: No ear pain, congestion, sore throat  Resp: No cough or trouble breathing  Cardiovascular: No chest pain or palpitation  Gastroenteric: No nausea/vomiting, diarrhea, constipation  :  No change in urine output; no dysuria  MS: No joint or muscle pain  Skin: No rashes  Neuro: No headache; no abnormal movements  Remainder negative, except as per the HPI

## 2020-02-25 NOTE — ED PROVIDER NOTE - ATTENDING CONTRIBUTION TO CARE
85 yo female on eliquis with fall/syncope and head injury.  Abrasion on scalp does not require repair.  CT head, check labs, admit for syncope work-up.

## 2020-02-26 ENCOUNTER — APPOINTMENT (OUTPATIENT)
Dept: GERIATRICS | Facility: CLINIC | Age: 85
End: 2020-02-26

## 2020-02-26 DIAGNOSIS — I82.512 CHRONIC EMBOLISM AND THROMBOSIS OF LEFT FEMORAL VEIN: ICD-10-CM

## 2020-02-26 DIAGNOSIS — J44.9 CHRONIC OBSTRUCTIVE PULMONARY DISEASE, UNSPECIFIED: ICD-10-CM

## 2020-02-26 DIAGNOSIS — R55 SYNCOPE AND COLLAPSE: ICD-10-CM

## 2020-02-26 DIAGNOSIS — I48.0 PAROXYSMAL ATRIAL FIBRILLATION: ICD-10-CM

## 2020-02-26 DIAGNOSIS — Z02.9 ENCOUNTER FOR ADMINISTRATIVE EXAMINATIONS, UNSPECIFIED: ICD-10-CM

## 2020-02-26 LAB
ANION GAP SERPL CALC-SCNC: 11 MMOL/L — SIGNIFICANT CHANGE UP (ref 5–17)
BUN SERPL-MCNC: 20 MG/DL — SIGNIFICANT CHANGE UP (ref 7–23)
CALCIUM SERPL-MCNC: 9.4 MG/DL — SIGNIFICANT CHANGE UP (ref 8.4–10.5)
CHLORIDE SERPL-SCNC: 109 MMOL/L — HIGH (ref 96–108)
CO2 SERPL-SCNC: 22 MMOL/L — SIGNIFICANT CHANGE UP (ref 22–31)
CREAT SERPL-MCNC: 0.85 MG/DL — SIGNIFICANT CHANGE UP (ref 0.5–1.3)
GLUCOSE BLDC GLUCOMTR-MCNC: 129 MG/DL — HIGH (ref 70–99)
GLUCOSE SERPL-MCNC: 163 MG/DL — HIGH (ref 70–99)
HCT VFR BLD CALC: 41.2 % — SIGNIFICANT CHANGE UP (ref 34.5–45)
HGB BLD-MCNC: 13 G/DL — SIGNIFICANT CHANGE UP (ref 11.5–15.5)
MAGNESIUM SERPL-MCNC: 2.1 MG/DL — SIGNIFICANT CHANGE UP (ref 1.6–2.6)
MCHC RBC-ENTMCNC: 29.5 PG — SIGNIFICANT CHANGE UP (ref 27–34)
MCHC RBC-ENTMCNC: 31.6 GM/DL — LOW (ref 32–36)
MCV RBC AUTO: 93.6 FL — SIGNIFICANT CHANGE UP (ref 80–100)
NRBC # BLD: 0 /100 WBCS — SIGNIFICANT CHANGE UP (ref 0–0)
PHOSPHATE SERPL-MCNC: 3 MG/DL — SIGNIFICANT CHANGE UP (ref 2.5–4.5)
PLATELET # BLD AUTO: 175 K/UL — SIGNIFICANT CHANGE UP (ref 150–400)
POTASSIUM SERPL-MCNC: 4 MMOL/L — SIGNIFICANT CHANGE UP (ref 3.5–5.3)
POTASSIUM SERPL-SCNC: 4 MMOL/L — SIGNIFICANT CHANGE UP (ref 3.5–5.3)
RBC # BLD: 4.4 M/UL — SIGNIFICANT CHANGE UP (ref 3.8–5.2)
RBC # FLD: 13.7 % — SIGNIFICANT CHANGE UP (ref 10.3–14.5)
SODIUM SERPL-SCNC: 142 MMOL/L — SIGNIFICANT CHANGE UP (ref 135–145)
WBC # BLD: 5.57 K/UL — SIGNIFICANT CHANGE UP (ref 3.8–10.5)
WBC # FLD AUTO: 5.57 K/UL — SIGNIFICANT CHANGE UP (ref 3.8–10.5)

## 2020-02-26 PROCEDURE — 12345: CPT | Mod: NC

## 2020-02-26 PROCEDURE — 93306 TTE W/DOPPLER COMPLETE: CPT | Mod: 26

## 2020-02-26 PROCEDURE — 99223 1ST HOSP IP/OBS HIGH 75: CPT

## 2020-02-26 PROCEDURE — 99232 SBSQ HOSP IP/OBS MODERATE 35: CPT | Mod: GC

## 2020-02-26 RX ORDER — BUDESONIDE AND FORMOTEROL FUMARATE DIHYDRATE 160; 4.5 UG/1; UG/1
2 AEROSOL RESPIRATORY (INHALATION)
Refills: 0 | Status: DISCONTINUED | OUTPATIENT
Start: 2020-02-26 | End: 2020-02-28

## 2020-02-26 RX ORDER — APIXABAN 2.5 MG/1
2.5 TABLET, FILM COATED ORAL
Refills: 0 | Status: DISCONTINUED | OUTPATIENT
Start: 2020-02-26 | End: 2020-02-28

## 2020-02-26 RX ORDER — UMECLIDINIUM BROMIDE AND VILANTEROL TRIFENATATE 62.5; 25 UG/1; UG/1
1 POWDER RESPIRATORY (INHALATION)
Qty: 0 | Refills: 0 | DISCHARGE

## 2020-02-26 RX ORDER — WARFARIN SODIUM 2.5 MG/1
1 TABLET ORAL
Qty: 0 | Refills: 0 | DISCHARGE

## 2020-02-26 RX ORDER — WARFARIN SODIUM 2.5 MG/1
9 TABLET ORAL
Qty: 0 | Refills: 0 | DISCHARGE

## 2020-02-26 RX ORDER — APIXABAN 2.5 MG/1
1 TABLET, FILM COATED ORAL
Qty: 0 | Refills: 0 | DISCHARGE

## 2020-02-26 RX ADMIN — APIXABAN 2.5 MILLIGRAM(S): 2.5 TABLET, FILM COATED ORAL at 17:11

## 2020-02-26 RX ADMIN — APIXABAN 2.5 MILLIGRAM(S): 2.5 TABLET, FILM COATED ORAL at 08:04

## 2020-02-26 RX ADMIN — APIXABAN 2.5 MILLIGRAM(S): 2.5 TABLET, FILM COATED ORAL at 00:48

## 2020-02-26 RX ADMIN — BUDESONIDE AND FORMOTEROL FUMARATE DIHYDRATE 2 PUFF(S): 160; 4.5 AEROSOL RESPIRATORY (INHALATION) at 08:05

## 2020-02-26 NOTE — H&P ADULT - NSICDXPASTSURGICALHX_GEN_ALL_CORE_FT
PAST SURGICAL HISTORY:  S/P colectomy     S/P lobectomy of lung s/p LLL Lobectomy 6/2014 for lung CA    Status post left hip replacement

## 2020-02-26 NOTE — H&P ADULT - NSHPREVIEWOFSYSTEMS_GEN_ALL_CORE
REVIEW OF SYSTEMS:    CONSTITUTIONAL: No fevers, chills  EYES/ENT: No visual changes;  no URI sx   NECK: No pain or stiffness  RESPIRATORY: No cough, wheezing, no shortness of breath  CARDIOVASCULAR: No chest pain or palpitations  GASTROINTESTINAL: no nausea, vomiting, no abdominal pain, no BRBPR  GENITOURINARY: no hematuria, no dysuria  NEUROLOGICAL: no numbness, +headaches, no confusion   MUSCULOSKELETAL: no back pain, no focal weakness   SKIN: No rashes, or lesions   PSYCH: no anxiety, depression  HEME: no gum bleeding, no bruising

## 2020-02-26 NOTE — H&P ADULT - PROBLEM SELECTOR PLAN 5
1.  Name of PCP: Dr Ankit Dewey  2.  PCP Contacted on Admission: [ ] Y    [ ] N    3.  PCP contacted at Discharge: [ ] Y    [ ] N    [ ] N/A  4.  Post-Discharge Appointment Date and Location:  5.  Summary of Handoff given to PCP:

## 2020-02-26 NOTE — H&P ADULT - NSHPSOCIALHISTORY_GEN_ALL_CORE
former smoker, no etoh, no drugs   lives at home and daughter lives downstairs  walks with walker, needs help with most ADLs

## 2020-02-26 NOTE — PHYSICAL THERAPY INITIAL EVALUATION ADULT - PERTINENT HX OF CURRENT PROBLEM, REHAB EVAL
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 Pt states she has had multiple episodes over the past month where she has found herself waking on the ground, but not recalling the actual fall.

## 2020-02-26 NOTE — PROGRESS NOTE ADULT - SUBJECTIVE AND OBJECTIVE BOX
Mohsin Khan, MD  Attending Physician, Division Of Hospital Medicine  Pager: [phone], Office: [phone]  Off hour pager: [phone]    Patient is a 84y old  Female who presents with a chief complaint of syncope, falls    SUBJECTIVE / OVERNIGHT EVENTS:  Seen, examined the patient this am, spoke to Alexis Moncada on Phone at bedside  Resting in bed, no c/o chest pain, SOB, fever, but legs gave out she think, not sure and fell at home twice  Hemodynamically stable, Tele- SR 60-90      MEDICATIONS  (STANDING):  apixaban 2.5 milliGRAM(s) Oral two times a day  budesonide 160 MICROgram(s)/formoterol 4.5 MICROgram(s) Inhaler 2 Puff(s) Inhalation two times a day    MEDICATIONS  (PRN):      Vital Signs Last 24 Hrs  T(C): 36.5 (26 Feb 2020 11:50), Max: 36.6 (25 Feb 2020 16:21)  T(F): 97.7 (26 Feb 2020 11:50), Max: 97.9 (26 Feb 2020 00:49)  HR: 55 (26 Feb 2020 05:45) (55 - 74)  BP: 150/74 (26 Feb 2020 05:45) (131/77 - 150/74)  BP(mean): --  RR: 18 (26 Feb 2020 11:50) (17 - 18)  SpO2: 96% (26 Feb 2020 11:50) (92% - 96%)  CAPILLARY BLOOD GLUCOSE      POCT Blood Glucose.: 129 mg/dL (26 Feb 2020 03:20)    I&O's Summary    25 Feb 2020 07:01  -  26 Feb 2020 07:00  --------------------------------------------------------  IN: 200 mL / OUT: 0 mL / NET: 200 mL    26 Feb 2020 07:01  -  26 Feb 2020 14:38  --------------------------------------------------------  IN: 480 mL / OUT: 1200 mL / NET: -720 mL        PHYSICAL EXAM:-  GENERAL: NAD, well-developed  EYES: EOMI, PERRLA, conjunctiva and sclera clear  NECK: Supple, No JVD, no thyromegaly  CHEST/LUNG: Clear to auscultation bilaterally; No wheeze  HEART: Regular rate and rhythm; S1, S2 audible, No murmurs, rubs, or gallops  ABDOMEN: Soft, Nontender, Nondistended; Bowel sounds present  EXTREMITIES:  2+ Peripheral Pulses, No clubbing, cyanosis, or edema  NEURO: AAOx2, demented, no focal deficit      LABS:                        13.0   5.57  )-----------( 175      ( 26 Feb 2020 07:33 )             41.2     02-26    142  |  109<H>  |  20  ----------------------------<  163<H>  4.0   |  22  |  0.85    Ca    9.4      26 Feb 2020 07:33  Phos  3.0     02-26  Mg     2.1     02-26    TPro  6.3  /  Alb  3.6  /  TBili  0.4  /  DBili  x   /  AST  16  /  ALT  14  /  AlkPhos  65  02-25    PT/INR - ( 25 Feb 2020 17:37 )   PT: 12.9 sec;   INR: 1.13 ratio         RADIOLOGY & ADDITIONAL TESTS:    Imaging Personally Reviewed: CT brain, CT maxillery

## 2020-02-26 NOTE — PHYSICAL THERAPY INITIAL EVALUATION ADULT - GAIT DEVIATIONS NOTED, PT EVAL
decreased weight-shifting ability/decreased lucretia/decreased velocity of limb motion/decreased step length

## 2020-02-26 NOTE — H&P ADULT - NSHPLABSRESULTS_GEN_ALL_CORE
Labs, imaging and EKG personally reviewed and interpreted by me. EKG is sinus, TWI in V1/V2,                            13.1   7.98  )-----------( 173      ( 25 Feb 2020 17:37 )             41.3     02-25    139  |  107  |  27<H>  ----------------------------<  149<H>  3.9   |  23  |  0.89    Ca    9.1      25 Feb 2020 17:37    TPro  6.3  /  Alb  3.6  /  TBili  0.4  /  DBili  x   /  AST  16  /  ALT  14  /  AlkPhos  65  02-25        PT/INR - ( 25 Feb 2020 17:37 )   PT: 12.9 sec;   INR: 1.13 ratio    < from: CT Head No Cont (02.25.20 @ 17:46) >  FINDINGS:  Prominent ventricles and sulci compatible with age-related volume loss. No acute hemorrhage, mass effect, midline shift, hydrocephalus, or extra-axial fluid collections. Mild patchy hypoattenuation within the white matter, likely secondary to chronic microvascular changes.    Air-fluid levels within the bilateral maxillarysinuses, left greater than right. Within the subcutaneous tissues of the left maxilla and left frontal scalp, there are nonspecific small foci of air. The mastoid air cells are clear. 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    The calvarium is intact.     IMPRESSION:  No acute hemorrhage.    Air-fluid levels within the bilateral maxillary sinuses, left greater than right. In addition, within the subcutaneous tissues of the left maxilla and left frontal scalp, there are small nonspecific foci of air. These findings are suspicious for a facial bone fracture or soft tissue injury. Dedicated noncontrast CT maxillofacial can be done for further evaluation if clinically indicated.

## 2020-02-26 NOTE — H&P ADULT - NSICDXPASTMEDICALHX_GEN_ALL_CORE_FT
PAST MEDICAL HISTORY:  Afib     Breast cancer     Diabetes mellitus diet controlled diabetes    Lung cancer, left

## 2020-02-26 NOTE — PHYSICAL THERAPY INITIAL EVALUATION ADULT - ADDITIONAL COMMENTS
Pt lives alone on the second floor of a private house. Pt has 4 steps to enter and 10 steps inside to negotiate. Pt's dtr and son in law live on the first floor of house. Pt was Ind with all ADLs and amb with RW.

## 2020-02-26 NOTE — H&P ADULT - ASSESSMENT
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

## 2020-02-26 NOTE — H&P ADULT - NSICDXFAMILYHX_GEN_ALL_CORE_FT
FAMILY HISTORY:  Family history of dementia  Family history of secondary cancer of bone and bone marrow    Sibling  Still living? Yes, Estimated age: Age Unknown  Family history of lung cancer, Age at diagnosis: Age Unknown

## 2020-02-26 NOTE — H&P ADULT - PROBLEM SELECTOR PLAN 3
pt with chronic DVT of LLE as noted on duplex from 1/2020  pt with chronic b/l LE swelling, currently at baseline; on eliquis   will recheck b/l LE dopplers to r/o DVT

## 2020-02-26 NOTE — H&P ADULT - PROBLEM SELECTOR PLAN 2
PAST MEDICAL HISTORY:  Asthma     DM (diabetes mellitus)     HTN (hypertension)     Lymphedema of both lower extremities     Pulmonary embolism pAF on eliquis  currently in sinus, delta trop 16-->21  c/w eliquis

## 2020-02-26 NOTE — H&P ADULT - NSHPPHYSICALEXAM_GEN_ALL_CORE
Vital Signs Last 24 Hrs  T(C): 36.6 (26 Feb 2020 00:49), Max: 36.6 (25 Feb 2020 16:21)  T(F): 97.9 (26 Feb 2020 00:49), Max: 97.9 (26 Feb 2020 00:49)  HR: 56 (26 Feb 2020 00:49) (56 - 74)  BP: 132/72 (26 Feb 2020 00:49) (132/72 - 146/72)  BP(mean): --  RR: 18 (26 Feb 2020 00:49) (18 - 18)  SpO2: 95% (26 Feb 2020 00:49) (95% - 96%)    PHYSICAL EXAM:  GENERAL: NAD, well-developed, appears stated age   HEAD:  L temporal lobe abrasion with matted blood, non tender, normocephalic  EYES: EOMI, conjunctiva and sclera clear  NECK: Supple, no JVD  CHEST/LUNG: Clear to auscultation bilaterally; no wheezing or rales  HEART: Regular rate and rhythm; no murmurs  ABDOMEN: Soft, nontender, nondistended; bowel sounds present  EXTREMITIES:  2+ Peripheral Pulses, 1+ b/l LE pitting edema   PSYCH: calm affect, not anxious  NEUROLOGY: 5/5 strength in b/l LE plantar flexion/extension and UE, able to lift LE against gravity equally, AAOx3  SKIN: temporal abrasion as above   MUSCULOSKELETAL: no back pain, moving all extremities

## 2020-02-26 NOTE — PROGRESS NOTE ADULT - ASSESSMENT
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 Daughter who is seing her from [place] on [employer] called 911. No c/o chest pain, SOB, fever but c/o legs gave out, not sure.

## 2020-02-26 NOTE — H&P ADULT - HISTORY OF PRESENT ILLNESS
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 Pt states she has had multiple episodes over the past month where she has found herself waking on the ground, but not recalling the actual fall. On day of admission, pt had two such episodes - once while walking from bedroom to bathroom and the second episode when walking to the closet. She does not recall the fall either time and was walking with her walker both time; denies any prodromal symptoms - no CP, SOB, palpitations, no nausea/vomiting, no preceding headache. Daughter saw the fall on the camera and her daughter noted her to be bleeding from the head, which prompted them to call EMS. Pt does endorse lightheadedness whenever she goes from a lying to standing position, but knows to sit/stand up very slowly and always takes her time. Of note, pt states she has had poor PO intake for past few days as she has been very sleepy. Denies any fevers, chills, sinus pain, +headache at location of fall, no diarrhea, no urinary symptoms, +chronic b/l LE swelling, currently at baseline. Uses depends for urinary incontinence, has occasional fecal incontinence.       Patient was recently evaluated at ED last month for near syncopal episodes. Patient follows with Dr. Shanks as outpatient for history of DVT/PE, started on Eliquis last visit 1/2020 from Coumadin for extended therapy with concern for recurrent falls. US dopplers at time with residual thrombus of L common femoral and popliteal veins, TTE obtained at time without gross abnormalities.

## 2020-02-26 NOTE — H&P ADULT - PROBLEM SELECTOR PLAN 1
pt with multiple falls and syncopal episodes in past month, unclear etiology  had recent TTE showing mild stage I dHF  delta trop and EKG not indicative of acute ischemic   will monitor on tele in setting of Afib   ?orthostatics vs vasovagal - check orthostatics, would hold lasix despite LE swelling for now   CTH without acute infarct/hemorrhage and non focal exam  CT MF showing sinusitis but pt without clinical signs, afebrile and normal WBC - will hold off on tx for now   consider CT PA if no other etiology elucidated for r/o PE if clinical suspicion high    encourage PO intake

## 2020-02-26 NOTE — PHYSICAL THERAPY INITIAL EVALUATION ADULT - PRECAUTIONS/LIMITATIONS, REHAB EVAL
On day of admission, pt had two such episodes - once while walking from bedroom to bathroom and the second episode when walking to the closet. She does not recall the fall either time and was walking with her walker both time.  Dtr saw the fall on the camera and her daughter noted her to be bleeding from the head, which prompted them to call EMS. Pt does endorse lightheadedness whenever she goes from a lying to standing position, but knows to sit/stand up very slowly and always takes her time.  Of note, pt states she has had poor PO intake for past few days as she has been very sleepy.  (-) CT head

## 2020-02-26 NOTE — PROGRESS NOTE ADULT - SUBJECTIVE AND OBJECTIVE BOX
Medications:  apixaban 2.5 milliGRAM(s) Oral two times a day  budesonide 160 MICROgram(s)/formoterol 4.5 MICROgram(s) Inhaler 2 Puff(s) Inhalation two times a day    PMH/PSH/FH/SH:  Unchanged    ROS:  Unchanged        Vitals:  T(C): 36.5 (02-26-20 @ 11:50), Max: 36.6 (02-25-20 @ 16:21)  HR: 55 (02-26-20 @ 05:45) (55 - 74)  BP: 150/74 (02-26-20 @ 05:45) (131/77 - 150/74)  RR: 18 (02-26-20 @ 11:50) (17 - 18)  SpO2: 96% (02-26-20 @ 11:50) (92% - 96%)        GENERAL: No acute distress, well-developed  HEAD:  Atraumatic, Normocephalic  ENT: EOMI, conjunctiva and sclera clear, Neck supple, No JVD, moist mucosa  CHEST/LUNG: Clear to auscultation bilaterally; No wheeze  HEART: Regular rate and rhythm; equal S1 and S2, no murmurs  ABDOMEN: Soft  EXTREMITIES:  No clubbing, cyanosis, 1+ pitting edema of BL LE L > R, chronic per patient  PSYCH: Nl behavior, nl affect  NEUROLOGY: AAOx3, non-focal, cranial nerves grossly intact  SKIN: Normal color      TELE:      LABS:                      13.0   5.57  )-----------( 175      ( 26 Feb 2020 07:33 )             41.2     02-26  142  |  109<H>  |  20  ----------------------------<  163<H>  4.0   |  22  |  0.85    Ca    9.4      26 Feb 2020 07:33  Phos  3.0     02-26  Mg     2.1     02-26    TPro  6.3  /  Alb  3.6  /  TBili  0.4  /  DBili  x   /  AST  16  /  ALT  14  /  AlkPhos  65  02-25    PT/INR - ( 25 Feb 2020 17:37 )   PT: 12.9 sec;   INR: 1.13 ratio Alert, no distress.  Denies CP, palps or dyspnea     Medications:  apixaban 2.5 milliGRAM(s) Oral two times a day  budesonide 160 MICROgram(s)/formoterol 4.5 MICROgram(s) Inhaler 2 Puff(s) Inhalation two times a day    PMH/PSH/FH/SH:  Unchanged    ROS:  Unchanged    Vitals:  T(C): 36.5 (02-26-20 @ 11:50), Max: 36.6 (02-25-20 @ 16:21)  HR: 55 (02-26-20 @ 05:45) (55 - 74)  BP: 150/74 (02-26-20 @ 05:45) (131/77 - 150/74)  RR: 18 (02-26-20 @ 11:50) (17 - 18)  SpO2: 96% (02-26-20 @ 11:50) (92% - 96%)    GENERAL: No acute distress  CHEST/LUNG: Clear to auscultation bilaterally; No wheeze  HEART: Regular rate and rhythm; equal S1 and S2, no murmurs  ABDOMEN: Soft, non-tender.  EXTREMITIES:  No clubbing, cyanosis, 1+ pitting edema of BL LE L > R, chronic per patient  PSYCH: Nl behavior, nl affect  NEUROLOGY: AAOx3, non-focal, cranial nerves grossly intact  SKIN: Normal color      TELE:  SR 50 - 80 bpm.    LABS:                      13.0   5.57  )-----------( 175      ( 26 Feb 2020 07:33 )             41.2     02-26  142  |  109<H>  |  20  ----------------------------<  163<H>  4.0   |  22  |  0.85    Ca    9.4      26 Feb 2020 07:33  Phos  3.0     02-26  Mg     2.1     02-26    TPro  6.3  /  Alb  3.6  /  TBili  0.4  /  DBili  x   /  AST  16  /  ALT  14  /  AlkPhos  65  02-25    PT/INR - ( 25 Feb 2020 17:37 )   PT: 12.9 sec;   INR: 1.13 ratio      Troponin T, High Sensitivity (02.25.20 @ 22:32)    Troponin T, High Sensitivity Result: 21: Rapid upward or downward changes in high-sensitivity troponin levels  suggest acute myocardial injury. Renal impairment may cause sustained  troponin elevations.  Normal: <6 - 14 ng/L  Indeterminate: 15-51 ng/L  Elevated: > 51 ng/L  See http://labs/test/[employer] on the Best Bid for more  information ng/L    Troponin T, High Sensitivity (02.25.20 @ 17:37)    Troponin T, High Sensitivity Result: 16: Rapid upward or downward changes in high-sensitivity troponin levels  suggest acute myocardial injury. Renal impairment may cause sustained  troponin elevations.  Normal: <6 - 14 ng/L  Indeterminate: 15-51 ng/L  Elevated: > 51 ng/L  See http://labs/test/[employer] on the Best Bid for more  information ng/L      TTE with Doppler (w/Cont) (01.07.20 @ 12:47) >  Patient name: MARU VILLASEÑOR  YOB: 1935   Age: 84 (F)   MR#: [id number]  Study Date: 1/7/2020  Study quality: Technically fair  ------------------------------------------------------------------------  Dimensions:    Normal Values:  LA:     2.8    2.0 - 4.0 cm  Ao:     2.9    2.0 - 3.8 cm  SEPTUM: 0.9    0.6 - 1.2 cm  PWT:    1.0    0.6 - 1.1 cm  LVIDd:  4.1    3.0 - 5.6 cm  LVIDs: 2.3    1.8 - 4.0 cm  Derived variables:  LVMI: 71 g/m2  RWT: 0.48  Fractional short: 44 %  ------------------------------------------------------------------------  Observations:  Mitral Valve: Normal mitral valve. Mild mitral regurgitation.  Aortic Valve/Aorta: Calcified trileaflet aortic valve with normal opening. Peak left ventricular outflow tract gradient equals 6 mm Hg, mean gradient is equal to 3 mm Hg,   LVOT velocity time integral equals 25 cm. Aortic Root: 2.9 cm.  Left Atrium: Normal left atrium.  LA volume index = 25 cc/m2.  Left Ventricle: Normal left ventricular systolic function.  Endocardial visualization enhanced with intravenous injection of Ultrasonic Enhancing Agent (Definity). Normal left ventricular internal dimensions and wall thicknesses. Mild diastolic dysfunction (Stage I).  Right Heart: Normal right atrium. Normal right ventricular size and function. Normal tricuspid valve. Mild tricuspid regurgitation. Normal pulmonic valve. Minimal pulmonic regurgitation.  Pericardium/Pleura: Normal pericardium with no pericardial effusion. Hemodynamic: Estimated right atrial pressure is 8 mm Hg.  Estimated right ventricular systolic pressure equals 37 mmHg, assuming right atrial pressure equals 8 mm Hg, consistent with borderline pulmonary hypertension.  ------------------------------------------------------------------------  Conclusions:  1. Normal left ventricular internal dimensions and wall thicknesses.  2. Normal left ventricular systolic function.   Endocardial visualization enhanced with intravenous injection of Ultrasonic Enhancing Agent (Definity).   3. Mild diastolic dysfunction (Stage I).  4. Estimated pulmonary artery systolic pressure equals 37 mm Hg, assuming right atrial pressure equals 8 mm Hg, consistent with borderline pulmonary pressures.  ------------------------------------------------------------------------  Confirmed on  1/7/2020 - 14:40:16 by Abraham Hagan M.D.  ------------------------------------------------------------------------

## 2020-02-26 NOTE — PROGRESS NOTE ADULT - ASSESSMENT
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    Now presents for evaluation of recurrent syncope.   Cardiology consulted for hsTroponin with positive delta troponin.      #1.  Elevated troponin  - No active chest pain, hsTroponin 16->21, EKG without acute ischemia, not suggestive of myocardial ischemia  - Recent TTE with normal systolic function, no valvular disease, patient without evidence of heart failure on exam  - No indication for additional ischemic workup at this time    #2.  Paroxysmal a fib  - In sinus rhythm on examination  - Continue home Eliquis 2.5mg BID    #3. 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    Now presents for evaluation of recurrent syncope; also has a recent hx. of falling.   Cardiology consulted for hsTroponin with positive delta troponin.      #1.  Elevated troponin  - No active chest pain, EKG without acute ischemic changes; presentation not suggestive of myocardial ischemia  - Recent TTE with normal systolic function, no valvular disease, patient without evidence of heart failure on exam  - No indication for additional ischemic workup at this time    #2.  Paroxysmal a fib  - Remains in sinus rhythm on tele  - continue telemetry  - Continue home Eliquis 2.5mg BID    #3. Syncope; history of DVT/PE on lifelong AC  - Patient with recurrent presyncope/syncope for past month, unclear etiology  - Repeat bilateral LE dopplers, can consider CT PE to rule out PE if no other etiology identified for syncope      Lorenzo Vasquez M.D.  Cardiology Attending, Consult Service  Beeper     All Cardiology service information can be found 24/7 on [email or web address], password: [employer]

## 2020-02-27 ENCOUNTER — APPOINTMENT (OUTPATIENT)
Dept: OTOLARYNGOLOGY | Facility: CLINIC | Age: 85
End: 2020-02-27

## 2020-02-27 DIAGNOSIS — H61.20 IMPACTED CERUMEN, UNSPECIFIED EAR: ICD-10-CM

## 2020-02-27 LAB
ANION GAP SERPL CALC-SCNC: 14 MMOL/L — SIGNIFICANT CHANGE UP (ref 5–17)
BUN SERPL-MCNC: 21 MG/DL — SIGNIFICANT CHANGE UP (ref 7–23)
CALCIUM SERPL-MCNC: 9.9 MG/DL — SIGNIFICANT CHANGE UP (ref 8.4–10.5)
CHLORIDE SERPL-SCNC: 106 MMOL/L — SIGNIFICANT CHANGE UP (ref 96–108)
CO2 SERPL-SCNC: 23 MMOL/L — SIGNIFICANT CHANGE UP (ref 22–31)
CREAT SERPL-MCNC: 0.89 MG/DL — SIGNIFICANT CHANGE UP (ref 0.5–1.3)
GLUCOSE SERPL-MCNC: 138 MG/DL — HIGH (ref 70–99)
POTASSIUM SERPL-MCNC: 4.6 MMOL/L — SIGNIFICANT CHANGE UP (ref 3.5–5.3)
POTASSIUM SERPL-SCNC: 4.6 MMOL/L — SIGNIFICANT CHANGE UP (ref 3.5–5.3)
SODIUM SERPL-SCNC: 143 MMOL/L — SIGNIFICANT CHANGE UP (ref 135–145)

## 2020-02-27 PROCEDURE — 99232 SBSQ HOSP IP/OBS MODERATE 35: CPT | Mod: GC

## 2020-02-27 PROCEDURE — 93970 EXTREMITY STUDY: CPT | Mod: 26

## 2020-02-27 PROCEDURE — ZZZZZ: CPT

## 2020-02-27 PROCEDURE — 99232 SBSQ HOSP IP/OBS MODERATE 35: CPT

## 2020-02-27 RX ORDER — CARBAMIDE PEROXIDE 81.86 MG/ML
1 SOLUTION/ DROPS AURICULAR (OTIC)
Refills: 0 | Status: DISCONTINUED | OUTPATIENT
Start: 2020-02-27 | End: 2020-02-28

## 2020-02-27 RX ADMIN — APIXABAN 2.5 MILLIGRAM(S): 2.5 TABLET, FILM COATED ORAL at 09:17

## 2020-02-27 RX ADMIN — CARBAMIDE PEROXIDE 1 DROP(S): 81.86 SOLUTION/ DROPS AURICULAR (OTIC) at 20:55

## 2020-02-27 RX ADMIN — APIXABAN 2.5 MILLIGRAM(S): 2.5 TABLET, FILM COATED ORAL at 17:26

## 2020-02-27 NOTE — PROGRESS NOTE ADULT - ASSESSMENT
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    Now presents for evaluation of recurrent syncope; also has a recent hx. of falling.   Cardiology consulted for hsTroponin with positive delta troponin.      #1.  Elevated troponin  - No active chest pain, EKG without acute ischemic changes; presentation not suggestive of myocardial ischemia  - TTE 2/26 with normal systolic function, no valvular disease, patient without evidence of heart failure on exam  - No indication for additional ischemic workup at this time    #2.  Paroxysmal a fib  - Remains in sinus rhythm on tele  - continue telemetry  - Continue home Eliquis 2.5mg BID    #3. 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    Now presents for evaluation of recurrent syncope; also has a recent hx. of falling.   Cardiology consulted for hsTroponin with positive delta troponin.      #1.  Elevated troponin  - No active chest pain, EKG without acute ischemic changes; presentation not suggestive of myocardial ischemia  - TTE 2/26 with normal systolic function, no valvular disease, patient without evidence of heart failure on exam  - No indication for additional ischemic workup at this time    #2.  Paroxysmal a fib  - Remains in sinus rhythm on tele  - continue telemetry  - Continue home Eliquis 2.5mg BID    #3. Syncope; history of DVT/PE on lifelong AC  - Patient with recurrent presyncope/syncope for past month, unclear etiology  - Repeat bilateral LE dopplers, can consider CT PE to rule out PE if no other etiology identified for syncope      Nicolás Avalos MD  Cardiology Fellow    Lorenzo Vasquez M.D.  Cardiology Attending, Consult Service  Beeper     All Cardiology service information can be found 24/7 on [email or web address], password: [employer].

## 2020-02-27 NOTE — CONSULT NOTE ADULT - PROBLEM SELECTOR RECOMMENDATION 9
- Otoscopic exam showed, moderate amounts of cerumen in b/l Ears.   - Will recommend Debrox 1gtt BID for 3 days to b/l Ears to soften the cerumen.   - Will attempt to remove in 2-3 days if pt still here.   - Can f/u with Dr. Dawson/ Ema    [address] [place] [address], [address]    [place] [address]    [phone].   - ENT will follow.  - Call with questions.     NICK COLUNGA PA-C.   # [id number]  ENT PA. - Otoscopic exam showed, moderate amounts of cerumen in b/l Ears.   - Will recommend Debrox 1gtt BID for 3 days to b/l Ears to soften the cerumen.   - Will attempt to remove in 2-3 days if pt still here.   - Can f/u with Dr. Dawson/ Ema if pt gets discharged.     [address] [facility], [address]    [place] [address]    [phone].   - ENT will follow.  - Call with questions.     NICK COLUNGA PA-C.   # [id number]  ENT PA.

## 2020-02-27 NOTE — CONSULT NOTE ADULT - SUBJECTIVE AND OBJECTIVE BOX
CC: Cerumen Impaction in b/l Ears.     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 ENT was consulted for removal of Cerumen from b/l ears.     PAST MEDICAL & SURGICAL HISTORY:  Breast cancer  Lung cancer, left  Diabetes mellitus: diet controlled diabetes  Afib  S/P colectomy  Status post left hip replacement  S/P lobectomy of lung: s/p LLL Lobectomy 6/2014 for lung CA    Allergies.  codeine (Fever; Rash)  contrast media (iodine-based) (Other)    Intolerances.  MEDICATIONS  (STANDING):  apixaban 2.5 milliGRAM(s) Oral two times a day  budesonide 160 MICROgram(s)/formoterol 4.5 MICROgram(s) Inhaler 2 Puff(s) Inhalation two times a day  carbamide peroxide Otic Solution 1 Drop(s) Both Ears two times a day  MEDICATIONS  (PRN):    Social History:   Family history: None.   ROS:   ENT: all negative except as noted in HPI   CV: denies palpitations  Pulm: denies SOB, cough, hemoptysis  GI: denies change in apetite, indigestion, n/v  : denies pertinent urinary symptoms, urgency  Neuro: denies numbness/tingling, loss of sensation  Psych: denies anxiety  MS: denies muscle weakness, instability  Heme: denies easy bruising or bleeding  Endo: denies heat/cold intolerance, excessive sweating  Vascular: denies LE edema    Vital Signs Last 24 Hrs  T(C): 36.4 (27 Feb 2020 20:08), Max: 36.6 (26 Feb 2020 23:19)  T(F): 97.5 (27 Feb 2020 20:08), Max: 97.8 (26 Feb 2020 23:19)  HR: 59 (27 Feb 2020 20:08) (58 - 61)  BP: 150/90 (27 Feb 2020 20:08) (148/71 - 177/79)  BP(mean): --  RR: 18 (27 Feb 2020 20:08) (18 - 18)  SpO2: 96% (27 Feb 2020 20:08) (92% - 96%)                        13.0   5.57  )-----------( 175      ( 26 Feb 2020 07:33 )             41.2    02-27    143  |  106  |  21  ----------------------------<  138<H>  4.6   |  23  |  0.89    Ca    9.9      27 Feb 2020 06:55  Phos  3.0     02-26  Mg     2.1     02-26     PHYSICAL EXAM:  Gen: NAD  Skin: No rashes, bruises, or lesions  Head: Normocephalic, Atraumatic  Face: no edema, erythema, or fluctuance. Parotid glands soft without mass  Eyes: no scleral injection  Ears: Right - ear canal clear, TM intact without effusion or erythema. No evidence of any fluid drainage. No mastoid tenderness, erythema, or ear bulging            Left - ear canal clear, TM intact without effusion or erythema. No evidence of any fluid drainage. No mastoid tenderness, erythema, or ear bulging  Nose: Nares bilaterally patent, no discharge  Mouth: No Stridor / Drooling / Trismus.  Mucosa moist, tongue/uvula midline, oropharynx clear  Neck: Flat, supple, no lymphadenopathy, trachea midline, no masses  Lymphatic: No lymphadenopathy  Resp: breathing easily, no stridor  CV: no peripheral edema/cyanosis  GI: nondistended   Peripheral vascular: no JVD or edema  Neuro: facial nerve intact, no facial droop. CC: Cerumen Impaction in b/l Ears.     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 ENT was consulted for removal of Cerumen from b/l ears.     PAST MEDICAL & SURGICAL HISTORY:  Breast cancer  Lung cancer, left  Diabetes mellitus: diet controlled diabetes  Afib  S/P colectomy  Status post left hip replacement  S/P lobectomy of lung: s/p LLL Lobectomy 6/2014 for lung CA    Allergies.  codeine (Fever; Rash)  contrast media (iodine-based) (Other)    Intolerances.  MEDICATIONS  (STANDING):  apixaban 2.5 milliGRAM(s) Oral two times a day  budesonide 160 MICROgram(s)/formoterol 4.5 MICROgram(s) Inhaler 2 Puff(s) Inhalation two times a day  carbamide peroxide Otic Solution 1 Drop(s) Both Ears two times a day  MEDICATIONS  (PRN):    Social History:   Family history: None.   ROS:   ENT: all negative except as noted in HPI   CV: denies palpitations  Pulm: denies SOB, cough, hemoptysis  GI: denies change in apetite, indigestion, n/v  : denies pertinent urinary symptoms, urgency  Psych: denies anxiety  Heme: denies easy bruising or bleeding  Endo: denies heat/cold intolerance, excessive sweating  Vascular: denies LE edema    Vital Signs Last 24 Hrs  T(C): 36.4 (27 Feb 2020 20:08), Max: 36.6 (26 Feb 2020 23:19)  T(F): 97.5 (27 Feb 2020 20:08), Max: 97.8 (26 Feb 2020 23:19)  HR: 59 (27 Feb 2020 20:08) (58 - 61)  BP: 150/90 (27 Feb 2020 20:08) (148/71 - 177/79)  BP(mean): --  RR: 18 (27 Feb 2020 20:08) (18 - 18)  SpO2: 96% (27 Feb 2020 20:08) (92% - 96%)                        13.0   5.57  )-----------( 175      ( 26 Feb 2020 07:33 )             41.2    02-27    143  |  106  |  21  ----------------------------<  138<H>  4.6   |  23  |  0.89    Ca    9.9      27 Feb 2020 06:55  Phos  3.0     02-26  Mg     2.1     02-26     PHYSICAL EXAM:  Gen: NAD  Skin: No rashes, bruises, or lesions  Head: Normocephalic, Atraumatic  Face: no edema, erythema, or fluctuance. Parotid glands soft without mass  Eyes: no scleral injection  Ears: Right - ear canal clear, TM intact without effusion or erythema. No evidence of any fluid drainage. No mastoid tenderness, erythema, or ear bulging            Left - ear canal clear, TM intact without effusion or erythema. No evidence of any fluid drainage. No mastoid tenderness, erythema, or ear bulging  Nose: Nares bilaterally patent, no discharge  Mouth: No Stridor / Drooling / Trismus.  Mucosa moist, tongue/uvula midline, oropharynx clear  Neck: Flat, supple, no lymphadenopathy, trachea midline, no masses  Lymphatic: No lymphadenopathy  Resp: breathing easily, no stridor  CV: no peripheral edema/cyanosis  GI: nondistended   Peripheral vascular: no JVD or edema  Neuro: facial nerve intact, no facial droop. CC: Cerumen Impaction in b/l Ears.     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 ENT was consulted for removal of Cerumen from b/l ears.     PAST MEDICAL & SURGICAL HISTORY:  Breast cancer  Lung cancer, left  Diabetes mellitus: diet controlled diabetes  Afib  S/P colectomy  Status post left hip replacement  S/P lobectomy of lung: s/p LLL Lobectomy 6/2014 for lung CA    Allergies.  codeine (Fever; Rash)  contrast media (iodine-based) (Other)    Intolerances.  MEDICATIONS  (STANDING):  apixaban 2.5 milliGRAM(s) Oral two times a day  budesonide 160 MICROgram(s)/formoterol 4.5 MICROgram(s) Inhaler 2 Puff(s) Inhalation two times a day  carbamide peroxide Otic Solution 1 Drop(s) Both Ears two times a day  MEDICATIONS  (PRN):    Social History:   Family history: None.   ROS:   ENT: all negative except as noted in HPI   CV: denies palpitations  Pulm: denies SOB, cough, hemoptysis  GI: denies change in apetite, indigestion, n/v  : denies pertinent urinary symptoms, urgency  Psych: denies anxiety  Heme: denies easy bruising or bleeding  Endo: denies heat/cold intolerance, excessive sweating  Vascular: denies LE edema    Vital Signs Last 24 Hrs  T(C): 36.4 (27 Feb 2020 20:08), Max: 36.6 (26 Feb 2020 23:19)  T(F): 97.5 (27 Feb 2020 20:08), Max: 97.8 (26 Feb 2020 23:19)  HR: 59 (27 Feb 2020 20:08) (58 - 61)  BP: 150/90 (27 Feb 2020 20:08) (148/71 - 177/79)  BP(mean): --  RR: 18 (27 Feb 2020 20:08) (18 - 18)  SpO2: 96% (27 Feb 2020 20:08) (92% - 96%)                        13.0   5.57  )-----------( 175      ( 26 Feb 2020 07:33 )             41.2    02-27    143  |  106  |  21  ----------------------------<  138<H>  4.6   |  23  |  0.89    Ca    9.9      27 Feb 2020 06:55  Phos  3.0     02-26  Mg     2.1     02-26     PHYSICAL EXAM:  Gen: NAD  Skin: No rashes, bruises, or lesions  Head: Normocephalic, Atraumatic  Face: no edema, erythema, or fluctuance. Parotid glands soft without mass  Eyes: no scleral injection  Ears: Right - Moderate cerumen in EAC, TM intact without effusion or erythema. No evidence of any fluid drainage. No mastoid tenderness, erythema, or ear bulging            Left - Moderate Cerumen in EAC. TM intact without effusion or erythema. No evidence of any fluid drainage. No mastoid tenderness, erythema, or ear bulging  Nose: Nares bilaterally patent, no discharge  Mouth: No Stridor / Drooling / Trismus.  Mucosa moist, tongue/uvula midline, oropharynx clear  Neck: Flat, supple, no lymphadenopathy, trachea midline, no masses  Lymphatic: No lymphadenopathy  Resp: breathing easily, no stridor  CV: no peripheral edema/cyanosis  GI: nondistended   Peripheral vascular: no JVD or edema  Neuro: facial nerve intact, no facial droop.

## 2020-02-27 NOTE — CONSULT NOTE ADULT - ASSESSMENT
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 ENT was consulted for removal of Cerumen from b/l ears. Otoscopic exam showed, moderate amounts of cerumen in b/l Ears. 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 ENT was consulted for removal of Cerumen from b/l ears. Otoscopic exam showed, moderate amounts of cerumen in b/l Ears.

## 2020-02-27 NOTE — PROGRESS NOTE ADULT - SUBJECTIVE AND OBJECTIVE BOX
Patient seen and examined at bedside.    Overnight Events: No overnight events. No complaints this morning. Denies CP, palpitations, SOB.      Review of Systems: Unchanged        Medications:  apixaban 2.5 milliGRAM(s) Oral two times a day  budesonide 160 MICROgram(s)/formoterol 4.5 MICROgram(s) Inhaler 2 Puff(s) Inhalation two times a day      PAST MEDICAL & SURGICAL HISTORY: Unchanged    Vitals:  T(F): 97.7 (02-27), Max: 98.2 (02-26)  HR: 58 (02-27) (56 - 70)  BP: 177/79 (02-27) (129/68 - 177/79)  RR: 18 (02-27)  SpO2: 94% (02-27)      Physical Exam:  GENERAL: No acute distress  CHEST/LUNG: Clear to auscultation bilaterally; No wheeze  HEART: Regular rate and rhythm; equal S1 and S2, no murmurs  ABDOMEN: Soft, non-tender.  EXTREMITIES:  No clubbing, cyanosis, 1+ pitting edema of BL LE L > R, chronic per patient  PSYCH: Nl behavior, nl affect  NEUROLOGY: AAOx3, non-focal, cranial nerves grossly intact  SKIN: Normal color    Tele: SR , trigemeny    I&O's Summary  25 Feb 2020 07:01  -  26 Feb 2020 07:00  --------------------------------------------------------  IN: 200 mL / OUT: 0 mL / NET: 200 mL    26 Feb 2020 07:01  -  27 Feb 2020 06:21  --------------------------------------------------------  IN: 720 mL / OUT: 2800 mL / NET: -2080 mL                        13.0   5.57  )-----------( 175      ( 26 Feb 2020 07:33 )             41.2     02-26    142  |  109<H>  |  20  ----------------------------<  163<H>  4.0   |  22  |  0.85    Ca    9.4      26 Feb 2020 07:33  Phos  3.0     02-26  Mg     2.1     02-26    TPro  6.3  /  Alb  3.6  /  TBili  0.4  /  DBili  x   /  AST  16  /  ALT  14  /  AlkPhos  65  02-25    PT/INR - ( 25 Feb 2020 17:37 )   PT: 12.9 sec;   INR: 1.13 ratio           Troponin T, High Sensitivity (02.25.20 @ 22:32)    Troponin T, High Sensitivity Result: 21: Rapid upward or downward changes in high-sensitivity troponin levels  suggest acute myocardial injury. Renal impairment may cause sustained  troponin elevations.  Normal: <6 - 14 ng/L  Indeterminate: 15-51 ng/L  Elevated: > 51 ng/L  See [email or web address] on the [employer] for more  information ng/L    Troponin T, High Sensitivity (02.25.20 @ 17:37)    Troponin T, High Sensitivity Result: 16: Rapid upward or downward changes in high-sensitivity troponin levels  suggest acute myocardial injury. Renal impairment may cause sustained  troponin elevations.  Normal: <6 - 14 ng/L  Indeterminate: 15-51 ng/L  Elevated: > 51 ng/L  See [email or web address] on the [employer] for more  information ng/L    TTE 2/26/20:  Dimensions:    Normal Values:  LA:     3.5    2.0 - 4.0 cm  Ao:     2.8    2.0 - 3.8 cm  SEPTUM: 1.0    0.6 - 1.2 cm  PWT:    0.9    0.6 - 1.1 cm  LVIDd:  3.6    3.0 - 5.6 cm  LVIDs:  2.2    1.8 - 4.0 cm  Derived variables:  LVMI: 60 g/m2  RWT: 0.50  Fractional short: 38 %  EF (Garber Rule): 72 %Doppler Peak Velocity (m/sec):  AoV=1.7  ------------------------------------------------------------------------  Observations:  Mitral Valve: Normal mitral valve. Minimal mitral  regurgitation.  Aortic Valve/Aorta: Calcified trileaflet aortic valve with  decreased opening. Peak transaortic valve gradient equals  12 mm Hg, mean transaortic valvegradient equals 7 mm Hg,  estimated aortic valve area equals 1.8 sqcm (by continuity  equation), aortic valve velocity time integral equals 36  cm, consistent with mild aortic stenosis. Peak left  ventricular outflow tract gradient equals 6 mm Hg, mean  gradient is equal to 3 mm Hg, LVOT velocity time integral  equals 26 cm.  Aortic Root: 2.9 cm.  Left Atrium: Normal left atrium.  LA volume index = 29  cc/m2.  Left Ventricle: Normal left ventricular systolic function.  No segmental wall motion abnormalities. Normal left  ventricular internal dimensions and wall thicknesses. Mild  diastolic dysfunction (Stage I).  Right Heart: Normal right atrium. Normal right ventricular  size and function. Normal tricuspid valve. Mild tricuspid  regurgitation. Normal pulmonic valve. Minimal pulmonic  regurgitation.  Pericardium/Pleura: Normal pericardium with no pericardial  effusion.  Hemodynamic: Estimated right atrial pressure is 8 mm Hg.  Estimated right ventricular systolic pressure equals 35 mm  Hg, assuming right atrial pressure equals 8 mm Hg,  consistent with borderline pulmonary hypertension.  ------------------------------------------------------------------------  Conclusions:  1. Normal mitral valve. Minimal mitral regurgitation.  2. Calcified trileaflet aortic valve with decreased  opening. Peak transaortic valve gradient equals 12 mm Hg,  mean transaortic valve gradient equals 7 mm Hg, estimated  aortic valve area equals 1.8 sqcm (by continuity equation),  aortic valve velocity time integral equals 36 cm,  consistent with mild aortic stenosis.  3. Normal left ventricular systolic function. No segmental  wall motion abnormalities.  4. Normal right ventricular size and function.  ------------------------------------------------------------------------  Confirmed on  2/26/2020 - 17:04:37 by Oswaldo Sun MD  ------------------------------------------------------------------------      TTE 1/7/2020:  Dimensions:    Normal Values:  LA:     2.8    2.0 - 4.0 cm  Ao:     2.9    2.0 - 3.8 cm  SEPTUM: 0.9    0.6 - 1.2 cm  PWT:    1.0    0.6 - 1.1 cm  LVIDd:  4.1    3.0 - 5.6 cm  LVIDs: 2.3    1.8 - 4.0 cm  Derived variables:  LVMI: 71 g/m2  RWT: 0.48  Fractional short: 44 %  ------------------------------------------------------------------------  Observations:  Mitral Valve: Normal mitral valve. Mild mitral regurgitation.  Aortic Valve/Aorta: Calcified trileaflet aortic valve with normal opening. Peak left ventricular outflow tract gradient equals 6 mm Hg, mean gradient is equal to 3 mm Hg,   LVOT velocity time integral equals 25 cm. Aortic Root: 2.9 cm.  Left Atrium: Normal left atrium.  LA volume index = 25 cc/m2.  Left Ventricle: Normal left ventricular systolic function.  Endocardial visualization enhanced with intravenous injection of Ultrasonic Enhancing Agent (Definity). Normal left ventricular internal dimensions and wall thicknesses. Mild diastolic dysfunction (Stage I).  Right Heart: Normal right atrium. Normal right ventricular size and function. Normal tricuspid valve. Mild tricuspid regurgitation. Normal pulmonic valve. Minimal pulmonic regurgitation.  Pericardium/Pleura: Normal pericardium with no pericardial effusion. Hemodynamic: Estimated right atrial pressure is 8 mm Hg.  Estimated right ventricular systolic pressure equals 37 mmHg, assuming right atrial pressure equals 8 mm Hg, consistent with borderline pulmonary hypertension.  ------------------------------------------------------------------------  Conclusions:  1. Normal left ventricular internal dimensions and wall thicknesses.  2. Normal left ventricular systolic function.   Endocardial visualization enhanced with intravenous injection of Ultrasonic Enhancing Agent (Definity).   3. Mild diastolic dysfunction (Stage I).  4. Estimated pulmonary artery systolic pressure equals 37 mm Hg, assuming right atrial pressure equals 8 mm Hg, consistent with borderline pulmonary pressures.  ------------------------------------------------------------------------  Confirmed on  1/7/2020 - 14:40:16 by Abraham Hagan M.D.  ------------------------------------------------------------------------ Patient seen and examined at bedside.    Overnight Events: No overnight events. No complaints this morning. Denies CP, palpitations, SOB.      Review of Systems: Unchanged        Medications:  apixaban 2.5 milliGRAM(s) Oral two times a day  budesonide 160 MICROgram(s)/formoterol 4.5 MICROgram(s) Inhaler 2 Puff(s) Inhalation two times a day      PAST MEDICAL & SURGICAL HISTORY: Unchanged    Vitals:  T(F): 97.7 (02-27), Max: 98.2 (02-26)  HR: 58 (02-27) (56 - 70)  BP: 177/79 (02-27) (129/68 - 177/79)  RR: 18 (02-27)  SpO2: 94% (02-27)      Physical Exam:  GENERAL: No acute distress  CHEST/LUNG: Clear to auscultation bilaterally; No wheeze  HEART: Regular rate and rhythm; equal S1 and S2, no murmurs  ABDOMEN: Soft, non-tender.  EXTREMITIES:  No clubbing, cyanosis, 1+ pitting edema of BL LE L > R, chronic per patient  PSYCH: Nl behavior, nl affect  NEUROLOGY: AAOx3, non-focal, cranial nerves grossly intact  SKIN: Normal color    Tele: SR , trigemeny    I&O's Summary  25 Feb 2020 07:01  -  26 Feb 2020 07:00  --------------------------------------------------------  IN: 200 mL / OUT: 0 mL / NET: 200 mL    26 Feb 2020 07:01  -  27 Feb 2020 06:21  --------------------------------------------------------  IN: 720 mL / OUT: 2800 mL / NET: -2080 mL               LABS:             13.0   5.57  )-----------( 175      ( 26 Feb 2020 07:33 )             41.2     02-26  142  |  109<H>  |  20  ----------------------------<  163<H>  4.0   |  22  |  0.85    Ca    9.4      26 Feb 2020 07:33  Phos  3.0     02-26  Mg     2.1     02-26    TPro  6.3  /  Alb  3.6  /  TBili  0.4  /  DBili  x   /  AST  16  /  ALT  14  /  AlkPhos  65  02-25    PT/INR - ( 25 Feb 2020 17:37 )   PT: 12.9 sec;   INR: 1.13 ratio        Troponin T, High Sensitivity (02.25.20 @ 22:32)    Troponin T, High Sensitivity Result: 21: Rapid upward or downward changes in high-sensitivity troponin levels  suggest acute myocardial injury. Renal impairment may cause sustained  troponin elevations.  Normal: <6 - 14 ng/L  Indeterminate: 15-51 ng/L  Elevated: > 51 ng/L  See [email or web address] on the [employer] for more  information ng/L    Troponin T, High Sensitivity (02.25.20 @ 17:37)    Troponin T, High Sensitivity Result: 16: Rapid upward or downward changes in high-sensitivity troponin levels  suggest acute myocardial injury. Renal impairment may cause sustained  troponin elevations.  Normal: <6 - 14 ng/L  Indeterminate: 15-51 ng/L  Elevated: > 51 ng/L  See [email or web address] on the [employer] for more  information ng/L      TTE 2/26/20:  Dimensions:    Normal Values:  LA:     3.5    2.0 - 4.0 cm  Ao:     2.8    2.0 - 3.8 cm  SEPTUM: 1.0    0.6 - 1.2 cm  PWT:    0.9    0.6 - 1.1 cm  LVIDd:  3.6    3.0 - 5.6 cm  LVIDs:  2.2    1.8 - 4.0 cm  Derived variables:  LVMI: 60 g/m2  RWT: 0.50  Fractional short: 38 %  EF (Garber Rule): 72 %Doppler Peak Velocity (m/sec): AoV=1.7  ------------------------------------------------------------------------  Observations:  Mitral Valve: Normal mitral valve. Minimal mitral regurgitation.  Aortic Valve/Aorta: Calcified trileaflet aortic valve with decreased opening. Peak transaortic valve gradient equals 12 mm Hg, mean transaortic valvegradient equals 7 mm Hg, estimated aortic valve area equals 1.8 sqcm (by continuity equation), aortic valve velocity time integral equals 36 cm, consistent with mild aortic stenosis. Peak left ventricular outflow tract gradient equals 6 mm Hg, mean gradient is equal to 3 mm Hg, LVOT velocity time integral equals 26 cm.  Aortic Root: 2.9 cm.  Left Atrium: Normal left atrium.  LA volume index = 29 cc/m2.  Left Ventricle: Normal left ventricular systolic function. No segmental wall motion abnormalities. Normal left ventricular internal dimensions and wall thicknesses. Mild diastolic dysfunction (Stage I).   Right Heart: Normal right atrium. Normal right ventricular size and function. Normal tricuspid valve. Mild tricuspid regurgitation. Normal pulmonic valve. Minimal pulmonic regurgitation.   Pericardium/Pleura: Normal pericardium with no pericardial effusion.  Hemodynamic: Estimated right atrial pressure is 8 mm Hg. Estimated right ventricular systolic pressure equals 35 mmHg, assuming right atrial pressure equals 8 mm Hg, consistent with borderline pulmonary hypertension.  ------------------------------------------------------------------------  Conclusions:  1. Normal mitral valve. Minimal mitral regurgitation.  2. Calcified trileaflet aortic valve with decreased opening. Peak transaortic valve gradient equals 12 mm Hg, mean transaortic valve gradient equals 7 mm Hg, estimated aortic valve area equals 1.8 sqcm (by continuity equation), aortic valve velocity time integral equals 36 cm, consistent with mild aortic stenosis.  3. Normal left ventricular systolic function. No segmental wall motion abnormalities.  4. Normal right ventricular size and function.  ------------------------------------------------------------------------  Confirmed on  2/26/2020 - 17:04:37 by Oswaldo Sun MD  ------------------------------------------------------------------------      TTE 1/7/2020:  Dimensions:    Normal Values:  LA:     2.8    2.0 - 4.0 cm  Ao:     2.9    2.0 - 3.8 cm  SEPTUM: 0.9    0.6 - 1.2 cm  PWT:    1.0    0.6 - 1.1 cm  LVIDd:  4.1    3.0 - 5.6 cm  LVIDs: 2.3    1.8 - 4.0 cm  Derived variables:  LVMI: 71 g/m2  RWT: 0.48  Fractional short: 44 %  ------------------------------------------------------------------------  Observations:  Mitral Valve: Normal mitral valve. Mild mitral regurgitation.  Aortic Valve/Aorta: Calcified trileaflet aortic valve with normal opening. Peak left ventricular outflow tract gradient equals 6 mm Hg, mean gradient is equal to 3 mm Hg,   LVOT velocity time integral equals 25 cm. Aortic Root: 2.9 cm.  Left Atrium: Normal left atrium.  LA volume index = 25 cc/m2.  Left Ventricle: Normal left ventricular systolic function.  Endocardial visualization enhanced with intravenous injection of Ultrasonic Enhancing Agent (Definity). Normal left ventricular internal dimensions and wall thicknesses. Mild diastolic dysfunction (Stage I).  Right Heart: Normal right atrium. Normal right ventricular size and function. Normal tricuspid valve. Mild tricuspid regurgitation. Normal pulmonic valve. Minimal pulmonic regurgitation.  Pericardium/Pleura: Normal pericardium with no pericardial effusion. Hemodynamic: Estimated right atrial pressure is 8 mm Hg.  Estimated right ventricular systolic pressure equals 37 mmHg, assuming right atrial pressure equals 8 mm Hg, consistent with borderline pulmonary hypertension.  ------------------------------------------------------------------------  Conclusions:  1. Normal left ventricular internal dimensions and wall thicknesses.  2. Normal left ventricular systolic function.   Endocardial visualization enhanced with intravenous injection of Ultrasonic Enhancing Agent (Definity).   3. Mild diastolic dysfunction (Stage I).  4. Estimated pulmonary artery systolic pressure equals 37 mm Hg, assuming right atrial pressure equals 8 mm Hg, consistent with borderline pulmonary pressures.  ------------------------------------------------------------------------  Confirmed on  1/7/2020 - 14:40:16 by Abraham Hagan M.D.  ------------------------------------------------------------------------

## 2020-02-28 ENCOUNTER — TRANSCRIPTION ENCOUNTER (OUTPATIENT)
Age: 85
End: 2020-02-28

## 2020-02-28 VITALS
DIASTOLIC BLOOD PRESSURE: 71 MMHG | HEART RATE: 57 BPM | SYSTOLIC BLOOD PRESSURE: 131 MMHG | TEMPERATURE: 98 F | RESPIRATION RATE: 18 BRPM | OXYGEN SATURATION: 96 %

## 2020-02-28 PROCEDURE — 97161 PT EVAL LOW COMPLEX 20 MIN: CPT

## 2020-02-28 PROCEDURE — 76377 3D RENDER W/INTRP POSTPROCES: CPT

## 2020-02-28 PROCEDURE — 93306 TTE W/DOPPLER COMPLETE: CPT

## 2020-02-28 PROCEDURE — 84100 ASSAY OF PHOSPHORUS: CPT

## 2020-02-28 PROCEDURE — 93005 ELECTROCARDIOGRAM TRACING: CPT

## 2020-02-28 PROCEDURE — 82962 GLUCOSE BLOOD TEST: CPT

## 2020-02-28 PROCEDURE — 80048 BASIC METABOLIC PNL TOTAL CA: CPT

## 2020-02-28 PROCEDURE — 80053 COMPREHEN METABOLIC PANEL: CPT

## 2020-02-28 PROCEDURE — 85610 PROTHROMBIN TIME: CPT

## 2020-02-28 PROCEDURE — 84484 ASSAY OF TROPONIN QUANT: CPT

## 2020-02-28 PROCEDURE — 85027 COMPLETE CBC AUTOMATED: CPT

## 2020-02-28 PROCEDURE — 94640 AIRWAY INHALATION TREATMENT: CPT

## 2020-02-28 PROCEDURE — 70486 CT MAXILLOFACIAL W/O DYE: CPT

## 2020-02-28 PROCEDURE — 99239 HOSP IP/OBS DSCHRG MGMT >30: CPT

## 2020-02-28 PROCEDURE — 93970 EXTREMITY STUDY: CPT

## 2020-02-28 PROCEDURE — 83735 ASSAY OF MAGNESIUM: CPT

## 2020-02-28 PROCEDURE — 70450 CT HEAD/BRAIN W/O DYE: CPT

## 2020-02-28 PROCEDURE — 99285 EMERGENCY DEPT VISIT HI MDM: CPT | Mod: 25

## 2020-02-28 RX ORDER — FUROSEMIDE 40 MG
1 TABLET ORAL
Qty: 0 | Refills: 0 | DISCHARGE

## 2020-02-28 RX ORDER — CARBAMIDE PEROXIDE 81.86 MG/ML
1 SOLUTION/ DROPS AURICULAR (OTIC)
Qty: 0 | Refills: 0 | DISCHARGE
Start: 2020-02-28

## 2020-02-28 RX ADMIN — APIXABAN 2.5 MILLIGRAM(S): 2.5 TABLET, FILM COATED ORAL at 07:56

## 2020-02-28 RX ADMIN — CARBAMIDE PEROXIDE 1 DROP(S): 81.86 SOLUTION/ DROPS AURICULAR (OTIC) at 05:12

## 2020-02-28 NOTE — PROGRESS NOTE ADULT - PROBLEM SELECTOR PROBLEM 4
Cerumen impaction
COPD (chronic obstructive pulmonary disease)
COPD (chronic obstructive pulmonary disease)

## 2020-02-28 NOTE — PROGRESS NOTE ADULT - PROBLEM SELECTOR PLAN 1
The patient had multiple falls and syncopal episodes in past month. She forgets to use walker at time and doesn't want anybody to live with her per daughter- Coral, Falls are multifactorial- Vasovagal, deconditioning, DJD when she forgets to use walker  - No acute MI or arrythmia, TTE showing normal LV function, no wall motion abnormality. Hemodynamically stable  - Orthostatic vitals neg  - PT recommended Rehab, fall precaution  - d/c to [facility] today, spoke to Coral (Dtr)
The patient had multiple falls and syncopal episodes in past month. She forgets to use walker at time and doesn't want anybody to live with her per daughter- Coral, Falls are multifactorial- Vasovagal, deconditioning, DJD.  - No acute MI or arrythmia, TTE showing normal LV function, no wall motion abnormality. Hemodynamically stable  - Orthostatic vitals neg  - PT recommended Rehab, fall precaution  - Tele monitoring
The patient had multiple falls and syncopal episodes in past month. She forgets to use walker at time and doesn't want anybody to live with her per daughter- Coral, Falls are multifactorial- Vasovagal, deconditioning, DJD.  - No acute MI or arrythmia, recent TTE showing normal LV function, mild stage I dHF. Hemodynamically stable  - Orthostatic vitals, PT eval, fall precaution  - Tele monitoring

## 2020-02-28 NOTE — DISCHARGE NOTE PROVIDER - PROVIDER TOKENS
PROVIDER:[TOKEN:[[id number]],FOLLOWUP:[1 week],ESTABLISHEDPATIENT:[T]],PROVIDER:[TOKEN:[[id number]],FOLLOWUP:[1 week]],PROVIDER:[TOKEN:[[id number]],FOLLOWUP:[1 week]],PROVIDER:[TOKEN:[[id number]],FOLLOWUP:[1 week]]

## 2020-02-28 NOTE — DISCHARGE NOTE PROVIDER - NSDCCAREPROVSEEN_GEN_ALL_CORE_FT
Khan, Mohsinuzzaman  [facility] Cardiology, Consults Teaching Sv  [facility] Medicine, Advance PracticeTeam

## 2020-02-28 NOTE — DISCHARGE NOTE PROVIDER - CARE PROVIDERS DIRECT ADDRESSES
[email or web address]

## 2020-02-28 NOTE — PROVIDER CONTACT NOTE (OTHER) - SITUATION
pt baseline SB rate in 50's pt here for orthostatics positive and chronic DVT  pt had run of PAT for 3.7 sec up to 125 rate

## 2020-02-28 NOTE — CHART NOTE - NSCHARTNOTEFT_GEN_A_CORE
PAT's 125 x 3.7 seconds. Pt sleeping at the time. VSS. asymptomatic. will continue close monitoring. currently HR 60's C/W telemetry.     ~Radha Granado ANP-C [id number]

## 2020-02-28 NOTE — PROGRESS NOTE ADULT - NSHPATTENDINGPLANDISCUSS_GEN_ALL_CORE
cardiology fellow; patient seen and examined.       I was physically present for the key portions of the evaluation and management (E/M) service provided.    I agree with the above history, physical, and plan which I have reviewed and edited where appropriate.
cardiology fellow; patient seen and examined.       I was physically present for the key portions of the evaluation and management (E/M) service provided.    I agree with the above history, physical, and plan which I have reviewed and edited where appropriate.
ENT

## 2020-02-28 NOTE — PROGRESS NOTE ADULT - SUBJECTIVE AND OBJECTIVE BOX
Mohsin Khan, MD  Attending Physician, Division Of Hospital Medicine  Pager: [phone], Office: [phone]  Off hour pager: [phone]    Patient is a 84y old  Female who presents with a chief complaint of syncope, fall    SUBJECTIVE / OVERNIGHT EVENTS:  Seen, examined the patient this am, spoke to alexi Moncada over the phone  Resting in bed, no c/o SOB, dizziness, chest pain, fells great, afebrile  hemodynamically stable      MEDICATIONS  (STANDING):  apixaban 2.5 milliGRAM(s) Oral two times a day  budesonide 160 MICROgram(s)/formoterol 4.5 MICROgram(s) Inhaler 2 Puff(s) Inhalation two times a day  carbamide peroxide Otic Solution 1 Drop(s) Both Ears two times a day    MEDICATIONS  (PRN):      Vital Signs Last 24 Hrs  T(C): 36.6 (28 Feb 2020 12:06), Max: 36.9 (28 Feb 2020 04:11)  T(F): 97.8 (28 Feb 2020 12:06), Max: 98.4 (28 Feb 2020 04:11)  HR: 57 (28 Feb 2020 12:06) (55 - 59)  BP: 131/71 (28 Feb 2020 12:06) (131/71 - 168/90)  BP(mean): --  RR: 18 (28 Feb 2020 12:06) (18 - 18)  SpO2: 96% (28 Feb 2020 12:06) (94% - 96%)  CAPILLARY BLOOD GLUCOSE        I&O's Summary    27 Feb 2020 07:01  -  28 Feb 2020 07:00  --------------------------------------------------------  IN: 790 mL / OUT: 2350 mL / NET: -1560 mL    28 Feb 2020 07:01  -  28 Feb 2020 15:07  --------------------------------------------------------  IN: 480 mL / OUT: 700 mL / NET: -220 mL        PHYSICAL EXAM:-  GENERAL: NAD, well-developed  EYES: EOMI, PERRLA, conjunctiva and sclera clear  NECK: Supple, No JVD, no thyromegaly  CHEST/LUNG: Clear to auscultation bilaterally; No wheeze  HEART: Regular rate and rhythm; S1, S2 audible, No murmurs, rubs, or gallops  ABDOMEN: Soft, Nontender, Nondistended; Bowel sounds present  EXTREMITIES:  2+ Peripheral Pulses, No clubbing, cyanosis, or edema  NEURO: AAOx3, no focal deficit      LABS:    02-27    143  |  106  |  21  ----------------------------<  138<H>  4.6   |  23  |  0.89    Ca    9.9      27 Feb 2020 06:55      RADIOLOGY & ADDITIONAL TESTS:    Imaging Personally Reviewed: CXR, Echo, doppler LLE  Care Discussed with Consultants/Other Providers: ENT

## 2020-02-28 NOTE — PROGRESS NOTE ADULT - PROBLEM SELECTOR PROBLEM 3
Chronic deep vein thrombosis (DVT) of femoral vein of left lower extremity

## 2020-02-28 NOTE — DISCHARGE NOTE PROVIDER - NSDCMRMEDTOKEN_GEN_ALL_CORE_FT
Anoro Ellipta 62.5 mcg-25 mcg/inh inhalation powder: 1 puff(s) inhaled once a day  carbamide peroxide 6.5% otic solution: 1 drop(s) to each affected ear 2 times a day  cholecalciferol oral tablet: 1000 unit(s) orally once a day  Eliquis 2.5 mg oral tablet: 1 tab(s) orally 2 times a day  furosemide 20 mg oral tablet: 1 tab(s) orally once a day Anoro Ellipta 62.5 mcg-25 mcg/inh inhalation powder: 1 puff(s) inhaled once a day  carbamide peroxide 6.5% otic solution: 1 drop(s) to each affected ear 2 times a day for 3 days or ENT follows up.   cholecalciferol oral tablet: 1000 unit(s) orally once a day  Eliquis 2.5 mg oral tablet: 1 tab(s) orally 2 times a day

## 2020-02-28 NOTE — PROGRESS NOTE ADULT - SUBJECTIVE AND OBJECTIVE BOX
Mohsin Khan, MD  Attending Physician, Division Of Hospital Medicine  Pager: [phone], Office: [phone]  Off hour pager: [phone]    Patient is a 84y old  Female who presents with a chief complaint of syncope, falls    SUBJECTIVE / OVERNIGHT EVENTS:  Seen, examined the patient in am, on 2/27 around 11am  Feels ok, resting in bed, no c/o chest pain, SOB, fever,  Hemodynamically stable, Tele- SR 50-90      MEDICATIONS  (STANDING):  apixaban 2.5 milliGRAM(s) Oral two times a day  budesonide 160 MICROgram(s)/formoterol 4.5 MICROgram(s) Inhaler 2 Puff(s) Inhalation two times a day    MEDICATIONS  (PRN):      Vital Signs Last 24 Hrs  T(C): 36.5 (26 Feb 2020 11:50), Max: 36.6 (25 Feb 2020 16:21)  T(F): 97.7 (26 Feb 2020 11:50), Max: 97.9 (26 Feb 2020 00:49)  HR: 55 (26 Feb 2020 05:45) (55 - 74)  BP: 150/74 (26 Feb 2020 05:45) (131/77 - 150/74)  BP(mean): --  RR: 18 (26 Feb 2020 11:50) (17 - 18)  SpO2: 96% (26 Feb 2020 11:50) (92% - 96%)  CAPILLARY BLOOD GLUCOSE      POCT Blood Glucose.: 129 mg/dL (26 Feb 2020 03:20)    I&O's Summary    25 Feb 2020 07:01  -  26 Feb 2020 07:00  --------------------------------------------------------  IN: 200 mL / OUT: 0 mL / NET: 200 mL    26 Feb 2020 07:01  -  26 Feb 2020 14:38  --------------------------------------------------------  IN: 480 mL / OUT: 1200 mL / NET: -720 mL        PHYSICAL EXAM:-  GENERAL: NAD, well-developed  EYES: EOMI, PERRLA, conjunctiva and sclera clear  NECK: Supple, No JVD, no thyromegaly  CHEST/LUNG: Clear to auscultation bilaterally; No wheeze  HEART: Regular rate and rhythm; S1, S2 audible, No murmurs, rubs, or gallops  ABDOMEN: Soft, Nontender, Nondistended; Bowel sounds present  EXTREMITIES:  2+ Peripheral Pulses, No clubbing, cyanosis, or edema  NEURO: AAOx2, demented, no focal deficit      LABS:                        13.0   5.57  )-----------( 175      ( 26 Feb 2020 07:33 )             41.2     02-26    142  |  109<H>  |  20  ----------------------------<  163<H>  4.0   |  22  |  0.85    Ca    9.4      26 Feb 2020 07:33  Phos  3.0     02-26  Mg     2.1     02-26    TPro  6.3  /  Alb  3.6  /  TBili  0.4  /  DBili  x   /  AST  16  /  ALT  14  /  AlkPhos  65  02-25    PT/INR - ( 25 Feb 2020 17:37 )   PT: 12.9 sec;   INR: 1.13 ratio         RADIOLOGY & ADDITIONAL TESTS:    Imaging Personally Reviewed: CT brain, CT maxillery

## 2020-02-28 NOTE — DISCHARGE NOTE PROVIDER - NSDCCPCAREPLAN_GEN_ALL_CORE_FT
PRINCIPAL DISCHARGE DIAGNOSIS  Diagnosis: Syncope and collapse  Assessment and Plan of Treatment: Fainting usually is caused by fear, stress, pain, standing too long, over tired, overheated, going to bathroom, or coughing  Blood pressure can drop if you do not drink enough, blood pressure medication, alcohol, bleeding,  Consult with your doctor about driving  Avoid activity or condition that causes your syncope  Lay down with your feet up when you feel like you might faint.      SECONDARY DISCHARGE DIAGNOSES  Diagnosis: Cerumen impaction  Assessment and Plan of Treatment: You can follow up with Dr. Boo or Dr. Dawson as an outpatient for follow up for ear wax removal as needed.    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  Walking is encouraged, increase activity as tolerated.  If you develop new leg pain, swelling, and/or redness contact your healthcare provider.  If you develop new chest pain with difficulty breathing, a rapid heart rate and/or a feeling of passing out call emergency medical services 911.

## 2020-02-28 NOTE — PROGRESS NOTE ADULT - PROBLEM SELECTOR PLAN 5
Stable, no wheezing on exam, no respiratory distress   - c/w home bronchodilators
1.  Name of PCP: Dr Ankit Dewey  2.  PCP Contacted on Admission: [ ] Y    [ ] N    3.  PCP contacted at Discharge: [ ] Y    [ ] N    [ ] N/A  4.  Post-Discharge Appointment Date and Location:  5.  Summary of Handoff given to PCP:
1.  Name of PCP: Dr Ankit Dewey  2.  PCP Contacted on Admission: [ ] Y    [ ] N    3.  PCP contacted at Discharge: [ ] Y    [ ] N    [ ] N/A  4.  Post-Discharge Appointment Date and Location:  5.  Summary of Handoff given to PCP:

## 2020-02-28 NOTE — PROGRESS NOTE ADULT - PROBLEM SELECTOR PLAN 2
Spoke to patient and daughter- Coral. She might get ICH from frequent falls as she has been on AC. Echo showed normal LV function, no wall motion abnormality.   - will c/w Eliquis and PT celso HINKLE  - She need home care on d/c even after Rehab. Patient and family agrees
Spoke to patient and daughter- Coral. She might get ICH from frequent falls as she has been on AC. Echo showed normal LV function, no wall motion abnormality.   - will c/w Eliquis and PT celso HINKLE  - She need home care on d/c even after Rehab if need be
Spoke to patient and daughter- Coral. She might get ICH from frequent falls.   - will c/w Eliquis and wait for PT rec  - She need home care on d/c even after Rehab if need be
Normal for race

## 2020-02-28 NOTE — DISCHARGE NOTE NURSING/CASE MANAGEMENT/SOCIAL WORK - NSTRANSFERDENTURES_GEN_A_NUR
OUTPATIENT PROGRESS NOTE      Chief Complaint   Patient presents with   • Eye Exam     Annual Eye Exam       HISTORY OF PRESENT ILLNESS:  The patient presented to the office for her annual eye exam with a need to replace her broken glasses.  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    Last Eye Exam:11/18/2015    Patient Ocular History:  Corneal guttata  Anatomical narrow angles   Optic nerve asymmetry     Review of Systems:  1. Constitutional:  no  2. Ears, Nose, Throat: no  3. Cardiovascular: no  4. Respiratory: no  5. Gastrointestinal: no  6. Genitourinary: no  7. Musculoskeletal: no  8. Integumentary: no  9. Neurological: no  10. Endocrine: no  11. Hematologic/Lymphatic: no  12. Allergic/Immunologic: no  13. Psychiatric: no    PAST MEDICAL HISTORY:  Past Medical History:   Diagnosis Date   • Fracture of left lower leg 1989   • Otosclerosis of right ear        SURGICAL HISTORY:  Past Surgical History:   Procedure Laterality Date   • Inner ear surgery  3/2003    [facility] for otosclerosis   • Tubal ligation  1989       FAMILY HISTORY:  Family History   Problem Relation Age of Onset   • Diabetes Mother 75     on oral   • NEGATIVE FAMILY HX OF       2014 neg br,ut,ov colon cancer       SOCIAL HISTORY:  Social History     Social History   • Marital status:      Spouse name: N/A   • Number of children: N/A   • Years of education: N/A     Occupational History   • Not on file.     Social History Main Topics   • Smoking status: Former Smoker     Packs/day: 1.00     Years: 12.00     Quit date: 2/15/1988   • Smokeless tobacco: Never Used   • Alcohol use No   • Drug use: No   • Sexual activity: Not Currently     Other Topics Concern   • Not on file     Social History Narrative   • No narrative on file       ALLERGIES:  No Known Allergies    Current Outpatient Prescriptions   Medication Sig Dispense Refill   • metoPROLOL succinate (TOPROL-XL) 25 MG 24 hr  tablet Take 1 tablet by mouth 2 times daily. 60 tablet 12   • penicillin v potassium (VEETID) 500 MG tablet Take 1 tablet by mouth 3 times daily. 30 tablet 0   • sulfamethoxazole-trimethoprim (BACTRIM DS) 800-160 MG per tablet Take 1 tablet by mouth 2 times daily. 42 tablet 0   • cephALEXin (KEFLEX) 500 MG capsule 2 bid 40 capsule 0   • famciclovir (FAMVIR) 500 MG tablet Take 3 tabs onc 3 tablet 0   • venlafaxine (EFFEXOR) 37.5 MG tablet bid 60 tablet 6   • HYDROcodone-acetaminophen (NORCO) 5-325 MG per tablet Take 1-2 tablets by mouth every 6 hours as needed for Pain. 24 tablet 0     No current facility-administered medications for this visit.        ASSESSMENT:  1. Hyperopia of both eyes with astigmatism and presbyopia    2. Nuclear sclerotic cataract of both eyes    3. Corneal guttata        PLAN:       1. New Srx given for full time wear with change in prescription for more comfortably clear vision.  Discussed adaptation to new prescription.  Also measured for computer/near glasses to wear at work.  2.  Discussed the presence of cataracts and of the potential effects on vision.  These are visually insignificant at this time with distance BVA of 20/20 OD and OS.  Monitor annually.  3.  Discussed presence of.  Deepti does not wake with blurred vision.  Monitor       Return in about 1 year (around 3/21/2019) for annual wellness exam.               full/upper

## 2020-02-28 NOTE — PROGRESS NOTE ADULT - PROBLEM SELECTOR PLAN 4
ENT consult appreciated  - Debrox otic gtt ordered, they will see patient in Rehab ( [facility])
Stable, no wheezing on exam, no respiratory distress   - c/w home bronchodilators
Stable, no wheezing on exam, no respiratory distress   - c/w home bronchodilators

## 2020-02-28 NOTE — PROGRESS NOTE ADULT - PROBLEM SELECTOR PLAN 3
She has chronic DVT of LLE as noted on duplex from 1/2020. Repeat Dopplex LE showed persistent LLE DVT  - c/w Eliquis
She has chronic DVT of LLE as noted on duplex from 1/2020  - c/w Eliquis  - Dopplex LE
She has chronic DVT of LLE as noted on duplex from 1/2020  - c/w Eliquis  - Dopplex LE showed persistent LLE DVT

## 2020-02-28 NOTE — DISCHARGE NOTE PROVIDER - CARE PROVIDER_API CALL
Patricia Asif)  Geriatric Medicine; Internal Medicine  [address] [facility], [address]  [place] [address]  Phone: [phone]  Fax: [phone]  Established Patient  Follow Up Time: 1 week    Jesenia Boo)  Otolaryngology  [address] [place] [address] [address]  [place] [address]  Phone: [phone]  Fax: [phone]  Follow Up Time: 1 week    Storm Dawson)  Otolaryngology  [address] [place] [address] [address]  [place] [address]  Phone: [phone]  Fax: [phone]  Follow Up Time: 1 week    Adi Shanks)  Cardiovascular Disease; Endovascular Medicine; Interventional Cardiology; Vascular Medicine  [address] [place] [address]  Phone: [phone]  Fax: [phone]  Follow Up Time: 1 week

## 2020-02-28 NOTE — PROGRESS NOTE ADULT - PROBLEM SELECTOR PLAN 6
1.  Name of PCP: Dr Ankit Dewey  2.  PCP Contacted on Admission: [ ] Y    [ ] N    3.  PCP contacted at Discharge: [x ] Y    [ ] N    [ ] N/A  4.  Post-Discharge Appointment Date and Location: in office  5.  Summary of Handoff given to PCP: e-mail

## 2020-02-28 NOTE — DISCHARGE NOTE PROVIDER - HOSPITAL COURSE
85 y/o F with PMHx of pAF, chronic DVT/PE on Eliquis, DM2, Lung CA s/p LLL lobectomy, and malignant melanoma who presented s/p syncope/fall x 2.     The patient had multiple falls and syncopal episodes in past month. She forgets to use walker at time and doesn't want anybody to live with her per daughter- Coral, Falls are multifactorial- Vasovagal, deconditioning, DJD.    - No acute MI or arrythmia, TTE showing normal LV function, no wall motion abnormality. Hemodynamically stable    CTH showed No acute hemorrhage.    Air-fluid levels within the bilateral maxillary sinuses, left greater than right. In addition, within the subcutaneous tissues of the left maxilla and left frontal scalp, there are small nonspecific foci of air. These findings are suspicious for a facial bone fracture or soft tissue injury. Dedicated noncontrast CT maxillofacial can be done for further evaluation if clinically indicated.        - Orthostatic vitals neg    - PT recommended Rehab    She has chronic DVT of LLE as noted on duplex from 1/2020 that she is maintained on lifelong Eliquis for.     - Dopplex LE showed persistent LLE DVT.         ENT consulted for cerumen in b/l ears. Otoscopic exam showed, moderate amounts of cerumen in b/l Ears.     - Will recommend Debrox 1gtt BID for 3 days to b/l Ears to soften the cerumen.     - Can f/u with Dr. Dawson/ Ema upon discharge. Pt is medically stable for discharge to rehab today. 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     The patient had multiple falls and syncopal episodes in past month. The patient was admitted in Tele and remained hemodynamically stable        She forgets to use walker at time and doesn't want anybody to live with her as per daughter- Coral. Falls are multifactorial- Vasovagal, deconditioning, DJD.    No acute MI or arrythmia, TTE showing normal LV function, no wall motion abnormality. CTH showed No acute hemorrhage. Orthostatic vitals neg. She denies any pain in maxillary sinus area or nasal congestion    She has chronic DVT of LLE as noted on duplex from 1/2020 that she is maintained on lifelong Eliquis for. ENT consulted for cerumen in b/l ears. Otoscopic exam showed, moderate amounts of cerumen in b/l Ears. They recommend Debrox 1gtt BID for 3 days to b/l Ears to soften the cerumen. Can f/u with Dr. Dawson/ Ema upon discharge. Dopplex LE showed persistent LLE DVT. Pt is medically stable for discharge to rehab. Daughter- Coral is aware of the plan.

## 2020-02-28 NOTE — DISCHARGE NOTE NURSING/CASE MANAGEMENT/SOCIAL WORK - PATIENT PORTAL LINK FT
You can access the FollowMyHealth Patient Portal offered by [facility] by registering at the following website: [email or web address]. By joining [employer]’s FollowMyHealth portal, you will also be able to view your health information using other applications (apps) compatible with our system.

## 2020-02-28 NOTE — PROGRESS NOTE ADULT - ATTENDING COMMENTS
Spoke to daughter- Coral samuel/estevan time- 43 min
Seen, examined the patient in am, on 2/27 around 11am
Spoke to daughter Coral

## 2020-03-10 ENCOUNTER — APPOINTMENT (OUTPATIENT)
Dept: OTOLARYNGOLOGY | Facility: CLINIC | Age: 85
End: 2020-03-10

## 2020-05-01 ENCOUNTER — APPOINTMENT (OUTPATIENT)
Dept: CARDIOLOGY | Facility: CLINIC | Age: 85
End: 2020-05-01

## 2020-08-17 NOTE — H&P ADULT - HISTORY OF PRESENT ILLNESS
Please attempt to call and schedule pt for a AWV/ Last seen 2/22/2019  Thank you  NIGHT HOSPITALIST:  Patient UNKNOWN to me previously, assigned to me at this point via the ER and by Dr. Morocho to admit this 81 y/o F--patient seen with adult daughter and son, son in law in attendance--patient with a history of lung CA with past LEFT lower lobectomy for lung CA presumed to be in remission, malignant melanoma, breast CA with past tylectomy presumed to be disease free, type 2 DM but not on current treatment, past RIGHT DVT on Coumadin with recent discharge from [facility] in January 2018 for a LEFT leg DVT with patient resumed on Coumadin per patient's preference, with patient apparently initially declining rehab referral from last admission, with patient brought in by daughter following patient calling for help following daughter noted patient to be in a plank position at home after patient was attempting to break a fall.  Patient with fever, chills, rigors at home.  Patient with increased urination.  No HA, no focal weakness.  No LOC or head trauma.  Daughter was concerned if patient had an ictal episode with patient with (?) trismus.  No faecal or urinary incontinence.  No back pain, no tearing back pain.  No hip or extremity pain.  No abdominal pain, no red blood per rectum or melena.  Remaining review of systems not contributory. NIGHT HOSPITALIST:  Patient UNKNOWN to me previously, assigned to me at this point via the ER and by Dr. Morocho to admit this 81 y/o F--patient seen with adult daughter and son, son in law in attendance--patient with a history of lung CA with past LEFT lower lobectomy for lung CA presumed to be in remission, COPD, malignant melanoma, breast CA with past tylectomy presumed to be disease free, type 2 DM but not on current treatment, past RIGHT DVT on Coumadin with recent discharge from [facility] in January 2018 for a LEFT leg DVT with patient resumed on Coumadin per patient's preference, with patient apparently initially declining rehab referral from last admission, with patient brought in by daughter following patient calling for help following daughter noted patient to be in a plank position at home after patient was attempting to break a fall.  Patient with fever, chills, rigors at home.  Patient with increased urination.  No HA, no focal weakness.  No LOC or head trauma.  Daughter was concerned if patient had an ictal episode with patient with (?) trismus.  No faecal or urinary incontinence.  No back pain, no tearing back pain.  No hip or extremity pain.  No abdominal pain, no red blood per rectum or melena.  Remaining review of systems not contributory.

## 2021-01-01 NOTE — DISCHARGE NOTE ADULT - NS MD DC FALL RISK RISK
35.5 For information on Fall & Injury Prevention, visit [email or web address]

## 2021-03-08 RX ORDER — APIXABAN 2.5 MG/1
2.5 TABLET, FILM COATED ORAL
Qty: 180 | Refills: 0 | Status: ACTIVE | COMMUNITY
Start: 2020-01-03 | End: 1900-01-01

## 2021-06-11 ENCOUNTER — APPOINTMENT (OUTPATIENT)
Dept: CARDIOLOGY | Facility: CLINIC | Age: 86
End: 2021-06-11

## 2021-08-19 NOTE — ED PROVIDER NOTE - EKG #1 DATE/TIME
FOLLOW-UP VISIT    Patient Name: Jonathon Santos      : 1954     Age: 67 year old        ______________________________________________________________________________     Chief Complaint   Patient presents with     Radiation Therapy     Return visit, prostate cancer     BP (!) 141/91 (BP Location: Left arm, Cuff Size: Adult Large)   Pulse 80   Resp 16   Wt 88.6 kg (195 lb 6.4 oz)   BMI 25.79 kg/m       Date Radiation Completed: 7/15/21    Pain  Denies    Meds  Current Med List Reviewed: Yes  Medication Note:       PSA   Date Value Ref Range Status   2021 <0.01 0 - 4 ug/L Final     Comment:     Assay Method:  Chemiluminescence using Siemens Vista analyzer   2021 0.01 0 - 4 ug/L Final     Comment:     Assay Method:  Chemiluminescence using Siemens Vista analyzer   2021 0.04 0 - 4 ug/L Final     Comment:     Assay Method:  Chemiluminescence using Siemens Vista analyzer   2021 0.17 0 - 4 ug/L Final     Comment:     Assay Method:  Chemiluminescence using Siemens Vista analyzer   2021 8.18 (H) 0 - 4 ug/L Final     Comment:     Assay Method:  Chemiluminescence using Siemens Vista analyzer   2019 6.57 (H) 0 - 4 ug/L Final     Comment:     Assay Method:  Chemiluminescence using Siemens Vista analyzer     PSA Tumor Marker   Date Value Ref Range Status   2021 <0.01 0.00 - 4.00 ug/L Final   2021 <0.01 0.00 - 4.00 ug/L Final       Bowel:  Soft/normal     Bladder: improved, still some urgency at night, no meds taken  Nocturia: every 2-3 hours    Energy Level: normal    Appointments:   Urologist:       Other Notes: Working with Dr. Cisneros/med onc   09-Jan-2020 00:34

## 2022-03-08 NOTE — PHYSICAL THERAPY INITIAL EVALUATION ADULT - PATIENT/FAMILY/SIGNIFICANT OTHER GOALS STATEMENT, PT EVAL
RIGHT CARPAL TUNNEL RELEASE, LEFT CARPAL TUNNEL INJECTION    Phone Numbers: [phone]  Preferred Language:  English [1]    [email or web address]    Payor: HU[employer]NA / Plan: HUMANA PREFERRED HPN [id number] / Product Type: PPO MISC   Secondary?   No    Work Comp?:  No    Destinee Hoffmann MD    [facility] Internal Medicine  Phone: [phone]  Fax: [phone]    ALLERGIES:   Allergen Reactions   • Nicotine Other (See Comments)     Nicotine patch , leaves read marks/patches on her skin        Patient BMI Info:  Estimated body mass index is 34.47 kg/m² as calculated from the following:    Height as of 4/5/21: 5' 4.25\" (1.632 m).    Weight as of 9/8/21: 91.8 kg (202 lb 6.1 oz).    -  If scheduling at ASC:  MUST be below 50  • BMI 40-45 requires anesthesia review  • BMI 45-50 is anesthesia review and an airway evaluation.  • If the case is a straight local, no anesthesia review.        Orders from Provider (Copied from Message or CC Chart):  JENNIFER Chapman at 3/8/2022  1:38 PM    Status: Signed    (important suggestion)  The note has been blocked for the following reason: The patient requested that this note not be displayed in [employer].        Procedure:  63429, 20526 - RIGHT CARPAL TUNNEL RELEASE, LEFT CARPAL TUNNEL INJECTION  ICD-10 codes :  CARPAL TUNNEL G56.02 (L), G56.01 (R)  Tentative Date: TBD, PLEASE CALL PATIENT  Tentative Time: To follow  Duration of Procedure: 30 min  Hospital: [facility] OR ANY  Anesthesia: MAC  Surgical Assistant: yes  Length of Stay: Day Surgery  Equipment: NONE  Films Needed for OR: none needed  Preoperative Done By: PCP  Preoperative testing: CBC, BMP, Chest, pa & lat and EKG  Consent for: RIGHT CARPAL TUNNEL RELEASE, LEFT CARPAL TUNNEL INJECTION  Consent: To be signed at hospital        First postoperative visit at:  [facility]  2 WEEKS POST-OP                to go home

## 2022-05-18 NOTE — ED ADULT NURSE NOTE - CHIEF COMPLAINT QUOTE
I've been having L leg swelling and was just diagnosed with a L leg DVT Humira Pregnancy And Lactation Text: This medication is Pregnancy Category B and is considered safe during pregnancy. It is unknown if this medication is excreted in breast milk.

## 2022-07-11 NOTE — PATIENT PROFILE ADULT. - NS SC CAGE ALCOHOL EYE OPENER
Anesthesia Evaluation     NPO Solid Status: > 8 hours  NPO Liquid Status: > 8 hours           Airway   Mallampati: II  TM distance: >3 FB  No difficulty expected  Dental      Pulmonary    (+) shortness of breath,   Cardiovascular     (+) DVT,       Neuro/Psych  (+) dizziness/light headedness,    GI/Hepatic/Renal/Endo    (+)  GERD,      Musculoskeletal     Abdominal    Substance History      OB/GYN          Other      history of cancer                    Anesthesia Plan    ASA 2     general     intravenous induction     Anesthetic plan, risks, benefits, and alternatives have been provided, discussed and informed consent has been obtained with: patient.    Plan discussed with CAA.        CODE STATUS:    Level Of Support Discussed With: Patient  Code Status (Patient has no pulse and is not breathing): CPR (Attempt to Resuscitate)  Medical Interventions (Patient has pulse or is breathing): Full Support      
no

## 2022-10-06 NOTE — PROGRESS NOTE ADULT - SUBJECTIVE AND OBJECTIVE BOX
[facility], Division of Infectious Diseases  MAYA Pyle A. Lee    Name: MARU VILLASEÑOR  Age: 82y  Gender: Female  MRN: [id number]    Interval History--  Notes reviewed. Multiple musculoskeletal complaints, chronic and unchanged. Only other complaint is that she wants to leabe ("Show me the front door...and I'm out"). NO SOB or CP. No cough. Constipated, now trying to have her first BM since admission.     Past Medical History--  Breast cancer  Lung cancer, left  Diabetes mellitus  Afib  S/P colectomy  Status post left hip replacement  S/P lobectomy of lung      For details regarding the patient's social history, family history, and other miscellaneous elements, please refer the initial infectious diseases consultation and/or the admitting history and physical examination for this admission.    Allergies    codeine (Fever; Rash)  contrast media (iodine-based) (Other)    Intolerances        Medications--  Antibiotics:  ciprofloxacin   IVPB 400 milliGRAM(s) IV Intermittent every 12 hours  metroNIDAZOLE  IVPB 500 milliGRAM(s) IV Intermittent every 8 hours    Immunologic:    Other:  acetaminophen   Tablet PRN  ALBUTerol/ipratropium for Nebulization  cholecalciferol  dextrose 5%.  enoxaparin Injectable  ibuprofen  Tablet PRN  insulin lispro (HumaLOG) corrective regimen sliding scale  insulin lispro (HumaLOG) corrective regimen sliding scale  lidocaine   Patch  sodium chloride 0.9%.  umeclidinium 62.5 MICROgram(s)/vilanterol 25 MICROgram(s) Inhaler      Review of Systems--  A 10-point review of systems was obtained.   Review of systems otherwise negative except as previously noted.    Physical Examination--  Vital Signs: T(F): 97.3 (02-14-18 @ 11:46), Max: 99.3 (02-13-18 @ 13:27)  HR: 71 (02-14-18 @ 11:46)  BP: 141/69 (02-14-18 @ 11:46)  RR: 18 (02-14-18 @ 11:46)  SpO2: 97% (02-14-18 @ 11:46)  Wt(kg): --  General: Nontoxic-appearing Female in no acute distress.  HEENT: AT/NC. Anicteric. Conjunctiva pink and moist. Oropharynx/dentition- wearing surgical mask.  Neck: Not rigid. No sense of mass.  Nodes: None palpable.  Lungs: Clear bilaterally without rales, wheezing or rhonchi  Heart: Regular rate and rhythm. No Murmur. No rub. No gallop. No palpable thrill.  Abdomen: Bowel sounds present and normoactive. Soft. Nondistended. Nontender. No sense of mass. No organomegaly.  Back: No spinal tenderness. No costovertebral angle tenderness.   Extremities: No cyanosis or clubbing. 1+ edema.   Skin: Warm. Dry. Good turgor. No rash. No vasculitic stigmata.  Psychiatric: Appropriate affect and mood for situation.       Laboratory Studies--  CBC                        10.7   10.4  )-----------( 278      ( 14 Feb 2018 06:20 )             34.0       Chemistries  02-14    133<L>  |  96  |  20  ----------------------------<  215<H>  3.8   |  24  |  0.95    Ca    8.8      14 Feb 2018 06:20  Mg     2.2     02-13      Culture Data    Culture - Urine (collected 11 Feb 2018 18:27)  Source: .Urine Catheterized  Final Report (13 Feb 2018 20:29):    >100,000 CFU/ml Enterococcus faecalis    10,000 - 49,000 CFU/mL Pseudomonas aeruginosa  Organism: Enterococcus faecalis  Pseudomonas aeruginosa (13 Feb 2018 20:29)  Organism: Pseudomonas aeruginosa (13 Feb 2018 20:29)  Organism: Enterococcus faecalis (13 Feb 2018 20:29)    Culture - Blood (collected 11 Feb 2018 17:21)  Source: .Blood Blood-Peripheral  Preliminary Report (12 Feb 2018 18:01):    No growth to date.    Culture - Blood (collected 11 Feb 2018 17:21)  Source: .Blood Blood-Peripheral  Preliminary Report (12 Feb 2018 18:01):    No growth to date. Adult

## 2023-06-08 NOTE — ED ADULT NURSE NOTE - CARDIO ASSESSMENT
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 Go every 30 minutes for the first 2 hours after taking HCTZ    --Avoid dietary irritants (see sheet). Keep diary x 3-5 days to determine your irritants.  --continue working with pelvic floor PT   --URGE: stop Enablex  (darifenacen) 15 xl daily. RESTART if urinary frequency increases.  Failed mirabegron, VESIcare.  Takes 2-4 weeks to see if will have effect.  For dry mouth: get sour, sugar free lozenge or gum.               --UDS: +DI             --has shocks in arms/vagina--felt these with DIs on study  --STRESS:  Pessary vs. Sling.              --continue pessary             --consider periurethral injections or sling in future--needs to finish MS/neuro eval  --suds: +BIANCA/DI     2. Constipation:  -- Controlling constipation may help bladder urgency/leakage and fiber may better control cholesterol and blood glucose.   -- continue fiber and stool softener  -- Drink plenty of water!  -- Get a SQUATTY POTTY     3. Rectocele stage 1  -- work on constipation/straining with BMs     4. Vaginal atrophy  --continue vagifem every other night  -- can consider vaginal estrogen cream   --continue A&D twice daily OUTSIDE VAGINA     5. PESSARY CARE:   -- use #4 ring with support and incontinence knob f  -- Return to clinic every 3-4 months for pessary check  -- continue to use vagifem every other night inside vagina     6. PAD CARE  -- change pad EVERY TIME you go to the restroom, even if it is not wet.  --You can use small pads/panty liners inside of diaper  -- If you are at home, try not to wear pads unless necessary.   -- Use 100% cotton pads (ex. Kotex) instead of synthetic  -- Use barrier cream (ex. A&D, Aquaphor) to prevent frictional rubbing from overuse of pads     RTC for pessary check in 4 months   WDL

## 2023-07-05 NOTE — DISCHARGE NOTE ADULT - MONITOR YOUR WEIGHT DAILY. CALL YOUR DOCTOR FOR A WEIGHT GAIN OF 2-3 LBS. IN 24 HRS OR 3 LBS. OR MORE IN 1 WEEK, INCREASED SHORTNESS OF BREATH, TIREDNESS OR WEAKNESS, OR INCREASED SWELLING OF YOUR FEET AND LEGS
Brief Cross Cover Medicine Note    Alerted that patient has had neuro changes and continues to report 10/10 pain. This has been throughout the course of the day today but patient has refused pain meds today which is new. Pain has been to his left hip most severely, but radiates to other parts of his left side.       Assessed patient at bedside.  Patient is alert, in NAD.  He is noted to have some significant word finding difficulties.  He seems to understand what I am asking, but unable to communicate his thoughts.  He complains of left hip pain, but no other significant symptoms, denies CP, SOB, abdominal pain, n/v. He has 5/5 strength to bilateral upper extremities and right lower extremity.  His left lower extremity exam is limited due to his pain and refusal to move that limb.  He has no facial asymmetry, and no slurring of speech.  Heart and lungs on exam are unremarkable, abdominal exam unremarkable.    A/P:  -Obtain stat nonenhanced head CT  -Obtain VBG to assess for hypercapnia  -Encourage pain medications if patient continues to be in pain.       LENIN VillagomezC  [facility] Internal Medicine PATRICK  7/4/2023 7:46 PM      Addendum:   CT preliminarily negative for acute intracranial abnormality. Small vessel changes seen. VBG without hypercapnia, pCO2 low and pO2 also low, but improved from previous. Patient remains A&Ox4 per nursing, but still with word finding difficulty and evidence of confusion.   - agree with delirium protocol as ordered.           Statement Selected

## 2023-08-11 NOTE — PROGRESS NOTE ADULT - PROVIDER SPECIALTY LIST ADULT
Infectious Disease
Infectious Disease
Internal Medicine
Neurology
Neurology
Internal Medicine
case updated to pt and her son on 8/11

## 2023-08-16 NOTE — H&P ADULT - HISTORY OF PRESENT ILLNESS
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  She lives in an apartment above her daughter (at bedside) and says that over the past 2 days she has had 2 falls (4 falls in the past year).  She was ambulating alone then required a cane and now a walker 2/2 unsteadiness.  She is slated to get a hearing aide later this month in hopes that would help her balance.  Per her daughter she has also become increasingly forgetful over the past year.  She wears depends for urinary and occasionally fecal incontinence.  Last fall was yesterday when she fell out of the bed when she went to grab something and hit her back and head.  She was on the ground for 3 hours before her son arrived who helped her up.  She denies syncope and remembers the events.  She denies any chest pain, photophobia, neck stiffness, chills, nausea or vomiting does have both constipation intermittently diarrhea.  She is complaining of thoracic back pain after the fall. Also has nasal congestion for several days.
Yes

## 2024-07-05 NOTE — ED POST DISCHARGE NOTE - NS ED POST DC CALL 1
Continued Stay SW/CM Assessment/Plan of Care Note       Active Substitute Decision Maker (SDM)    There are no active Substitute Decision Maker (SDM) on file.           Progress note:  SW received phone call from patient's daughter Clariebl regarding discharge planning. Claribel requesting an update regarding IRP admission, however, decision still pending. Claribel requests referral to [facility] in case [facility] denies patient for IRP as IRP is families first choice upon discharge. ADITI sent referral to [facility] in case [facility] denies.     See SW/CM flowsheets for other objective data.    Disposition Recommendations:  Preliminary discharge destination: Planned Discharge Destination: Location not determined at this time  SW/CM recommendation for discharge: Sub-acute nursing home    Destination Pharmacy:        Discharge Plan/Needs:     Continued Care and Services - Admitted Since 7/3/2024       Destination        Service Provider Request Status Selected Services Address Phone Fax    [facility] - SNF PAN Pending - Request Sent N/A [address] [facility] [address] [phone]    [facility]SNF PAN Pending - Request Sent N/A [address] [facility] [phone] [phone]    [facility] - IP Pending - Request Sent N/A [address] [facility] [phone]                      Devices/ Equipment that need to be arranged for discharge:     Accepted   Pending insurance authorization   Others:    Anticipated date of DME availability: None    Prior To Hospitalization:    Living Situation: Family members and residing at Assisted living    .  Support Systems: Family members   Home Devices/Equipment: None            Mobility Assist Devices: Standard walker   Type of Service Prior to Hospitalization: None               Patient/Family discharge goal (s):  Sub-acute nursing home      Resources provided:           Prior Function  Bed Mobility: Modified Independent (07/03/24 1101 : Joanne Trinidad, TAMMY)  Transfers: Modified Independent (07/03/24 1101 : Joanne Trinidad, TAMMY)  Ambulation in the Home: Modified  Independent (07/03/24 1101 : Adore Bhatt, PT)       Current Function  Last Filed Values         Value Time User    Current Function  significantly below baseline level of function 7/4/2024  8:37 AM Adore Bhatt PT    Therapy Impairments  activity tolerance; balance; strength; communication; pain; ROM 7/4/2024  8:37 AM Adore Bhatt, PT    ADLs Requiring Support  bed mobility; transfers; ambulation; stairs 7/4/2024  8:37 AM Adore Bhatt, PT            Therapy Recommendations for Discharge:   PT:      Last Filed Values         Value Time User    PT Discharge Needs  intensive daily therapy  possible IRP 7/4/2024  8:37 AM Adore Bhatt, PT          OT:       Last Filed Values         Value Time User    OT Discharge Needs  intensive daily therapy 7/3/2024 12:19 PM Joanne Trinidad OT          SLP:    Last Filed Values       None            Mobility Equipment Recommended for Discharge: has a 4ww      Barriers to Discharge  Identified Barriers to Discharge/Transition Planning: Medical necessity for acute care, Rehab plan of care obstacle                   Attempted, left message

## 2024-08-28 NOTE — PATIENT PROFILE ADULT - HAVE YOU EXPERIENCED VIOLENCE OR A TRAUMATIC EVENT?
Spironolactone Pregnancy And Lactation Text: This medication can cause feminization of the male fetus and should be avoided during pregnancy. The active metabolite is also found in breast milk. Isotretinoin Counseling: Patient should get monthly blood tests, not donate blood, not drive at night if vision affected, not share medication, and not undergo elective surgery for 6 months after tx completed. Side effects reviewed, pt to contact office should one occur. Sarecycline Pregnancy And Lactation Text: This medication is Pregnancy Category D and not consider safe during pregnancy. It is also excreted in breast milk. Topical Sulfur Applications Pregnancy And Lactation Text: This medication is Pregnancy Category C and has an unknown safety profile during pregnancy. It is unknown if this topical medication is excreted in breast milk. Erythromycin Counseling:  I discussed with the patient the risks of erythromycin including but not limited to GI upset, allergic reaction, drug rash, diarrhea, increase in liver enzymes, and yeast infections. Topical Clindamycin Pregnancy And Lactation Text: This medication is Pregnancy Category B and is considered safe during pregnancy. It is unknown if it is excreted in breast milk. Benzoyl Peroxide Counseling: Patient counseled that medicine may cause skin irritation and bleach clothing.  In the event of skin irritation, the patient was advised to reduce the amount of the drug applied or use it less frequently.   The patient verbalized understanding of the proper use and possible adverse effects of benzoyl peroxide.  All of the patient's questions and concerns were addressed. Azithromycin Pregnancy And Lactation Text: This medication is considered safe during pregnancy and is also secreted in breast milk. Doxycycline Counseling:  Patient counseled regarding possible photosensitivity and increased risk for sunburn.  Patient instructed to avoid sunlight, if possible.  When exposed to sunlight, patients should wear protective clothing, sunglasses, and sunscreen.  The patient was instructed to call the office immediately if the following severe adverse effects occur:  hearing changes, easy bruising/bleeding, severe headache, or vision changes.  The patient verbalized understanding of the proper use and possible adverse effects of doxycycline.  All of the patient's questions and concerns were addressed. Azelaic Acid Counseling: Patient counseled that medicine may cause skin irritation and to avoid applying near the eyes.  In the event of skin irritation, the patient was advised to reduce the amount of the drug applied or use it less frequently.   The patient verbalized understanding of the proper use and possible adverse effects of azelaic acid.  All of the patient's questions and concerns were addressed. Dapsone Counseling: I discussed with the patient the risks of dapsone including but not limited to hemolytic anemia, agranulocytosis, rashes, methemoglobinemia, kidney failure, peripheral neuropathy, headaches, GI upset, and liver toxicity.  Patients who start dapsone require monitoring including baseline LFTs and weekly CBCs for the first month, then every month thereafter.  The patient verbalized understanding of the proper use and possible adverse effects of dapsone.  All of the patient's questions and concerns were addressed. Aklief counseling:  Patient advised to apply a pea-sized amount only at bedtime and wait 30 minutes after washing their face before applying.  If too drying, patient may add a non-comedogenic moisturizer.  The most commonly reported side effects including irritation, redness, scaling, dryness, stinging, burning, itching, and increased risk of sunburn.  The patient verbalized understanding of the proper use and possible adverse effects of retinoids.  All of the patient's questions and concerns were addressed. Topical Retinoid Pregnancy And Lactation Text: This medication is Pregnancy Category C. It is unknown if this medication is excreted in breast milk. High Dose Vitamin A Pregnancy And Lactation Text: High dose vitamin A therapy is contraindicated during pregnancy and breast feeding. Tazorac Pregnancy And Lactation Text: This medication is not safe during pregnancy. It is unknown if this medication is excreted in breast milk. Isotretinoin Pregnancy And Lactation Text: This medication is Pregnancy Category X and is considered extremely dangerous during pregnancy. It is unknown if it is excreted in breast milk. Winlevi Counseling:  I discussed with the patient the risks of topical clascoterone including but not limited to erythema, scaling, itching, and stinging. Patient voiced their understanding. Birth Control Pills Counseling: Birth Control Pill Counseling: I discussed with the patient the potential side effects of OCPs including but not limited to increased risk of stroke, heart attack, thrombophlebitis, deep venous thrombosis, hepatic adenomas, breast changes, GI upset, headaches, and depression.  The patient verbalized understanding of the proper use and possible adverse effects of OCPs. All of the patient's questions and concerns were addressed. Tetracycline Counseling: Patient counseled regarding possible photosensitivity and increased risk for sunburn.  Patient instructed to avoid sunlight, if possible.  When exposed to sunlight, patients should wear protective clothing, sunglasses, and sunscreen.  The patient was instructed to call the office immediately if the following severe adverse effects occur:  hearing changes, easy bruising/bleeding, severe headache, or vision changes.  The patient verbalized understanding of the proper use and possible adverse effects of tetracycline.  All of the patient's questions and concerns were addressed. Patient understands to avoid pregnancy while on therapy due to potential birth defects. Bactrim Counseling:  I discussed with the patient the risks of sulfa antibiotics including but not limited to GI upset, allergic reaction, drug rash, diarrhea, dizziness, photosensitivity, and yeast infections.  Rarely, more serious reactions can occur including but not limited to aplastic anemia, agranulocytosis, methemoglobinemia, blood dyscrasias, liver or kidney failure, lung infiltrates or desquamative/blistering drug rashes. Spironolactone Counseling: Patient advised regarding risks of diarrhea, abdominal pain, hyperkalemia, birth defects (for female patients), liver toxicity and renal toxicity. The patient may need blood work to monitor liver and kidney function and potassium levels while on therapy. The patient verbalized understanding of the proper use and possible adverse effects of spironolactone.  All of the patient's questions and concerns were addressed. Detail Level: Detailed Azelaic Acid Pregnancy And Lactation Text: This medication is considered safe during pregnancy and breast feeding. Erythromycin Pregnancy And Lactation Text: This medication is Pregnancy Category B and is considered safe during pregnancy. It is also excreted in breast milk. Benzoyl Peroxide Pregnancy And Lactation Text: This medication is Pregnancy Category C. It is unknown if benzoyl peroxide is excreted in breast milk. Topical Clindamycin Counseling: Patient counseled that this medication may cause skin irritation or allergic reactions.  In the event of skin irritation, the patient was advised to reduce the amount of the drug applied or use it less frequently.   The patient verbalized understanding of the proper use and possible adverse effects of clindamycin.  All of the patient's questions and concerns were addressed. Doxycycline Pregnancy And Lactation Text: This medication is Pregnancy Category D and not consider safe during pregnancy. It is also excreted in breast milk but is considered safe for shorter treatment courses. Topical Sulfur Applications Counseling: Topical Sulfur Counseling: Patient counseled that this medication may cause skin irritation or allergic reactions.  In the event of skin irritation, the patient was advised to reduce the amount of the drug applied or use it less frequently.   The patient verbalized understanding of the proper use and possible adverse effects of topical sulfur application.  All of the patient's questions and concerns were addressed. Azithromycin Counseling:  I discussed with the patient the risks of azithromycin including but not limited to GI upset, allergic reaction, drug rash, diarrhea, and yeast infections. Sarecycline Counseling: Patient advised regarding possible photosensitivity and discoloration of the teeth, skin, lips, tongue and gums.  Patient instructed to avoid sunlight, if possible.  When exposed to sunlight, patients should wear protective clothing, sunglasses, and sunscreen.  The patient was instructed to call the office immediately if the following severe adverse effects occur:  hearing changes, easy bruising/bleeding, severe headache, or vision changes.  The patient verbalized understanding of the proper use and possible adverse effects of sarecycline.  All of the patient's questions and concerns were addressed. Aklief Pregnancy And Lactation Text: It is unknown if this medication is safe to use during pregnancy.  It is unknown if this medication is excreted in breast milk.  Breastfeeding women should use the topical cream on the smallest area of the skin for the shortest time needed while breastfeeding.  Do not apply to nipple and areola. Minocycline Counseling: Patient advised regarding possible photosensitivity and discoloration of the teeth, skin, lips, tongue and gums.  Patient instructed to avoid sunlight, if possible.  When exposed to sunlight, patients should wear protective clothing, sunglasses, and sunscreen.  The patient was instructed to call the office immediately if the following severe adverse effects occur:  hearing changes, easy bruising/bleeding, severe headache, or vision changes.  The patient verbalized understanding of the proper use and possible adverse effects of minocycline.  All of the patient's questions and concerns were addressed. Dapsone Pregnancy And Lactation Text: This medication is Pregnancy Category C and is not considered safe during pregnancy or breast feeding. Use Enhanced Medication Counseling?: No Topical Retinoid counseling:  Patient advised to apply a pea-sized amount only at bedtime and wait 30 minutes after washing their face before applying.  If too drying, patient may add a non-comedogenic moisturizer. The patient verbalized understanding of the proper use and possible adverse effects of retinoids.  All of the patient's questions and concerns were addressed. Winlevi Pregnancy And Lactation Text: This medication is considered safe during pregnancy and breastfeeding. Birth Control Pills Pregnancy And Lactation Text: This medication should be avoided if pregnant and for the first 30 days post-partum. Tazorac Counseling:  Patient advised that medication is irritating and drying.  Patient may need to apply sparingly and wash off after an hour before eventually leaving it on overnight.  The patient verbalized understanding of the proper use and possible adverse effects of tazorac.  All of the patient's questions and concerns were addressed. Bactrim Pregnancy And Lactation Text: This medication is Pregnancy Category D and is known to cause fetal risk.  It is also excreted in breast milk. High Dose Vitamin A Counseling: Side effects reviewed, pt to contact office should one occur. no

## 2024-11-06 NOTE — ED ADULT NURSE NOTE - NS ED NURSE RECORD ANOTHER VITAL SIGN
Yes
 2-calculated by average/Not able to assess (calculate score using [facility] averaging method)

## 2025-04-01 NOTE — PROGRESS NOTE ADULT - PROBLEM SELECTOR PROBLEM 5
Acute midline thoracic back pain
COPD (chronic obstructive pulmonary disease)
COPD (chronic obstructive pulmonary disease)
Acute midline thoracic back pain
Medications

## 2025-07-07 NOTE — H&P ADULT - ATTENDING COMMENTS
yes Case endorsed to NP at  [facility] to be assumed by [facility] Hospitalist in am  This patient was assigned to me by the hospitalist in charge; my involvement in this case has consisted of the initial history, physical, chart review, and management plan.  This patient was previously unknown to me. Case endorsed to NP at [id number] [address] at pm.   Care to be assumed by [facility] Hospitalist in am  This patient was assigned to me by the hospitalist in charge; my involvement in this case has consisted of the initial history, physical, chart review, and management plan.  This patient was previously unknown to me. Case endorsed to NP at [id number] [address] at 930 pm.   Care to be assumed by [facility] Hospitalist in am  This patient was assigned to me by the hospitalist in charge; my involvement in this case has consisted of the initial history, physical, chart review, and management plan.  This patient was previously unknown to me.